# Patient Record
Sex: FEMALE | Race: BLACK OR AFRICAN AMERICAN | Employment: OTHER | URBAN - METROPOLITAN AREA
[De-identification: names, ages, dates, MRNs, and addresses within clinical notes are randomized per-mention and may not be internally consistent; named-entity substitution may affect disease eponyms.]

---

## 2018-07-12 ENCOUNTER — HOSPITAL ENCOUNTER (OUTPATIENT)
Dept: CARDIAC REHAB | Age: 63
Discharge: HOME | End: 2018-07-12
Payer: MEDICARE

## 2018-07-12 VITALS — HEIGHT: 66 IN | WEIGHT: 188 LBS | BODY MASS INDEX: 30.22 KG/M2

## 2018-07-12 PROCEDURE — 93798 PHYS/QHP OP CAR RHAB W/ECG: CPT

## 2018-07-12 RX ORDER — RANITIDINE 150 MG/1
300 CAPSULE ORAL AS NEEDED
COMMUNITY
End: 2018-08-20

## 2018-07-12 RX ORDER — CARVEDILOL 3.12 MG/1
TABLET ORAL 2 TIMES DAILY WITH MEALS
Status: ON HOLD | COMMUNITY
End: 2018-11-01 | Stop reason: SDUPTHER

## 2018-07-12 RX ORDER — DOXYCYCLINE 100 MG/1
10 CAPSULE ORAL DAILY
COMMUNITY
End: 2018-08-20

## 2018-07-12 RX ORDER — LOVASTATIN 20 MG/1
20 TABLET ORAL
COMMUNITY
End: 2018-08-20 | Stop reason: DRUGHIGH

## 2018-07-12 RX ORDER — GLIMEPIRIDE 4 MG/1
4 TABLET ORAL
COMMUNITY
End: 2019-04-17 | Stop reason: SDUPTHER

## 2018-07-12 RX ORDER — METAXALONE 800 MG/1
800 TABLET ORAL AS NEEDED
COMMUNITY
End: 2018-08-20

## 2018-07-12 RX ORDER — METFORMIN HYDROCHLORIDE 500 MG/1
500 TABLET, EXTENDED RELEASE ORAL 2 TIMES DAILY
COMMUNITY
End: 2018-08-20 | Stop reason: DRUGHIGH

## 2018-07-12 NOTE — CARDIO/PULMONARY
Cardiopulmonary Rehab Orientation: Met with  Derik Gaona for the initial session. Mrs Vanessa Culp is a 70year-old patient of Dr. Cholo Sanchez, who presents to rehab for cardiac conditioning and strengthening, S/P PCI/stenting  6 total on 3/23/2018; LVEF 49%. History also includes CAD,HTN,Diabetes,Hyperlipidemia,Obesity,Back lumbar bulging disc and needs surgery to have cheyenne placed ,thyroid disease,. Bilateral knee pain ; Allergic to PCN and sensitive to aspirin Immunization for influenza vaccine -pt does not receive flu vaccines-did get shingles vaccine. Pt is former smoker-quit 12/31/1999. Lungs were CTA; denied any cough. No LE edema was present. Exercise at home includes therabands a few times per week for 15 min. Limitations: Low back pain and . Patient was given an educational notebook and viewed the cardiac rehab DVD. Psychosocial: lives with -was only with high stress level today due to having to wait since May to get in program and unsure of what to expect. BMI 30.8 , based on height of 5'5.5\" and weight of 188lbs  . Pt was concerned regarding blood sugar being elevated -given list of fruits low and high in sugar for reference. Patient's identified goals are:   1. Wt loss-long term goal 38 pounds-short term 10 pounds  2. Decrease LDL  3. Home exercise program          Plan: Return for first exercise session on Tuesday July 17 at 0900.

## 2018-07-17 ENCOUNTER — HOSPITAL ENCOUNTER (OUTPATIENT)
Dept: CARDIAC REHAB | Age: 63
Discharge: HOME | End: 2018-07-17
Payer: MEDICARE

## 2018-07-17 VITALS — WEIGHT: 189 LBS | BODY MASS INDEX: 30.97 KG/M2

## 2018-07-17 PROCEDURE — 93798 PHYS/QHP OP CAR RHAB W/ECG: CPT

## 2018-07-19 ENCOUNTER — HOSPITAL ENCOUNTER (OUTPATIENT)
Dept: CARDIAC REHAB | Age: 63
Discharge: HOME | End: 2018-07-19
Payer: MEDICARE

## 2018-07-19 VITALS — WEIGHT: 190 LBS | BODY MASS INDEX: 31.14 KG/M2

## 2018-07-19 PROCEDURE — 93798 PHYS/QHP OP CAR RHAB W/ECG: CPT

## 2018-07-20 ENCOUNTER — HOSPITAL ENCOUNTER (OUTPATIENT)
Dept: CARDIAC REHAB | Age: 63
Discharge: HOME | End: 2018-07-20
Payer: MEDICARE

## 2018-07-20 VITALS — WEIGHT: 189 LBS | BODY MASS INDEX: 30.97 KG/M2

## 2018-07-20 PROCEDURE — 93798 PHYS/QHP OP CAR RHAB W/ECG: CPT

## 2018-07-24 ENCOUNTER — HOSPITAL ENCOUNTER (OUTPATIENT)
Dept: CARDIAC REHAB | Age: 63
Discharge: HOME | End: 2018-07-24
Payer: MEDICARE

## 2018-07-24 VITALS — BODY MASS INDEX: 30.97 KG/M2 | WEIGHT: 189 LBS

## 2018-07-24 PROCEDURE — 93798 PHYS/QHP OP CAR RHAB W/ECG: CPT

## 2018-07-26 ENCOUNTER — HOSPITAL ENCOUNTER (OUTPATIENT)
Dept: CARDIAC REHAB | Age: 63
Discharge: HOME | End: 2018-07-26
Payer: MEDICARE

## 2018-07-26 VITALS — BODY MASS INDEX: 30.97 KG/M2 | WEIGHT: 189 LBS

## 2018-07-26 PROCEDURE — 93798 PHYS/QHP OP CAR RHAB W/ECG: CPT

## 2018-07-27 ENCOUNTER — APPOINTMENT (OUTPATIENT)
Dept: CARDIAC REHAB | Age: 63
End: 2018-07-27
Payer: MEDICARE

## 2018-07-31 ENCOUNTER — APPOINTMENT (OUTPATIENT)
Dept: GENERAL RADIOLOGY | Age: 63
End: 2018-07-31
Attending: PHYSICIAN ASSISTANT
Payer: MEDICARE

## 2018-07-31 ENCOUNTER — HOSPITAL ENCOUNTER (OUTPATIENT)
Dept: CARDIAC REHAB | Age: 63
Discharge: HOME | End: 2018-07-31
Payer: MEDICARE

## 2018-07-31 ENCOUNTER — HOSPITAL ENCOUNTER (EMERGENCY)
Age: 63
Discharge: HOME | End: 2018-07-31
Attending: EMERGENCY MEDICINE
Payer: MEDICARE

## 2018-07-31 VITALS
HEIGHT: 65 IN | OXYGEN SATURATION: 100 % | HEART RATE: 94 BPM | BODY MASS INDEX: 31.49 KG/M2 | WEIGHT: 189 LBS | SYSTOLIC BLOOD PRESSURE: 170 MMHG | TEMPERATURE: 97.6 F | DIASTOLIC BLOOD PRESSURE: 82 MMHG | RESPIRATION RATE: 20 BRPM

## 2018-07-31 DIAGNOSIS — M54.2 NECK PAIN: ICD-10-CM

## 2018-07-31 DIAGNOSIS — M54.12 CERVICAL RADICULAR PAIN: Primary | ICD-10-CM

## 2018-07-31 DIAGNOSIS — M25.511 ACUTE PAIN OF RIGHT SHOULDER: ICD-10-CM

## 2018-07-31 PROCEDURE — 96372 THER/PROPH/DIAG INJ SC/IM: CPT

## 2018-07-31 PROCEDURE — 74011636637 HC RX REV CODE- 636/637: Performed by: PHYSICIAN ASSISTANT

## 2018-07-31 PROCEDURE — 74011250636 HC RX REV CODE- 250/636: Performed by: PHYSICIAN ASSISTANT

## 2018-07-31 PROCEDURE — A9270 NON-COVERED ITEM OR SERVICE: HCPCS | Performed by: PHYSICIAN ASSISTANT

## 2018-07-31 PROCEDURE — 73030 X-RAY EXAM OF SHOULDER: CPT

## 2018-07-31 PROCEDURE — 72052 X-RAY EXAM NECK SPINE 6/>VWS: CPT

## 2018-07-31 PROCEDURE — 99283 EMERGENCY DEPT VISIT LOW MDM: CPT

## 2018-07-31 RX ORDER — TRAMADOL HYDROCHLORIDE 50 MG/1
50 TABLET ORAL
Qty: 20 TAB | Refills: 0 | Status: SHIPPED | OUTPATIENT
Start: 2018-07-31 | End: 2018-08-10

## 2018-07-31 RX ORDER — PREDNISONE 10 MG/1
TABLET ORAL
Qty: 21 TAB | Refills: 0 | Status: SHIPPED | OUTPATIENT
Start: 2018-07-31 | End: 2018-08-06

## 2018-07-31 RX ORDER — PREDNISONE 20 MG/1
60 TABLET ORAL
Status: COMPLETED | OUTPATIENT
Start: 2018-07-31 | End: 2018-07-31

## 2018-07-31 RX ORDER — ASPIRIN 81 MG/1
81 TABLET ORAL DAILY
COMMUNITY

## 2018-07-31 RX ORDER — GABAPENTIN 100 MG/1
100 CAPSULE ORAL 3 TIMES DAILY
COMMUNITY
End: 2018-08-20

## 2018-07-31 RX ORDER — KETOROLAC TROMETHAMINE 30 MG/ML
30 INJECTION, SOLUTION INTRAMUSCULAR; INTRAVENOUS
Status: COMPLETED | OUTPATIENT
Start: 2018-07-31 | End: 2018-07-31

## 2018-07-31 RX ADMIN — PREDNISONE 60 MG: 20 TABLET ORAL at 09:09

## 2018-07-31 RX ADMIN — KETOROLAC TROMETHAMINE 30 MG: 30 INJECTION, SOLUTION INTRAMUSCULAR at 09:10

## 2018-07-31 NOTE — ED NOTES
PA has reviewed discharge instructions with the patient. The patient verbalized understanding. Patient discharged home via wheelchair with daughter.
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
## 2018-07-31 NOTE — DISCHARGE INSTRUCTIONS
Neck Pain: Care Instructions  Your Care Instructions    You can have neck pain anywhere from the bottom of your head to the top of your shoulders. It can spread to the upper back or arms. Injuries, painting a ceiling, sleeping with your neck twisted, staying in one position for too long, and many other activities can cause neck pain. Most neck pain gets better with home care. Your doctor may recommend medicine to relieve pain or relax your muscles. He or she may suggest exercise and physical therapy to increase flexibility and relieve stress. You may need to wear a special (cervical) collar to support your neck for a day or two. Follow-up care is a key part of your treatment and safety. Be sure to make and go to all appointments, and call your doctor if you are having problems. It's also a good idea to know your test results and keep a list of the medicines you take. How can you care for yourself at home? · Try using a heating pad on a low or medium setting for 15 to 20 minutes every 2 or 3 hours. Try a warm shower in place of one session with the heating pad. · You can also try an ice pack for 10 to 15 minutes every 2 to 3 hours. Put a thin cloth between the ice and your skin. · Take pain medicines exactly as directed. ¨ If the doctor gave you a prescription medicine for pain, take it as prescribed. ¨ If you are not taking a prescription pain medicine, ask your doctor if you can take an over-the-counter medicine. · If your doctor recommends a cervical collar, wear it exactly as directed. When should you call for help? Call your doctor now or seek immediate medical care if:    · You have new or worsening numbness in your arms, buttocks or legs.     · You have new or worsening weakness in your arms or legs.  (This could make it hard to stand up.)     · You lose control of your bladder or bowels.    Watch closely for changes in your health, and be sure to contact your doctor if:    · Your neck pain is getting worse.     · You are not getting better after 1 week.     · You do not get better as expected. Where can you learn more? Go to http://campbell-mer.info/. Enter [id number] in the search box to learn more about \"Neck Pain: Care Instructions. \"  Current as of: November 29, 2017  Content Version: 11.7  © 5336-0763 [employer]. Care instructions adapted under license by [employer] (which disclaims liability or warranty for this information). If you have questions about a medical condition or this instruction, always ask your healthcare professional. [place] [address] any warranty or liability for your use of this information. n     Pinched Nerve in the Neck: Care Instructions  Your Care Instructions  A pinched nerve in the neck happens when a vertebra or disc in the upper part of your spine is damaged. This damage can happen because of an injury. Or it can just happen with age. The changes caused by the damage may put pressure on a nearby nerve root, pinching it. This causes symptoms such as sharp pain in your neck, shoulder, arm, hand, or back. You may also have tingling or numbness. Sometimes it makes your arm weaker. The symptoms are usually worse when you turn your head or strain your neck. For many people, the symptoms get better over time and finally go away. Early treatment usually includes medicines for pain and swelling. Sometimes physical therapy and special exercises may help. Follow-up care is a key part of your treatment and safety. Be sure to make and go to all appointments, and call your doctor if you are having problems. It's also a good idea to know your test results and keep a list of the medicines you take. How can you care for yourself at home? · Be safe with medicines. Read and follow all instructions on the label. ¨ If the doctor gave you a prescription medicine for pain, take it as prescribed.   ¨ If you are not taking a prescription pain medicine, ask your doctor if you can take an over-the-counter medicine. · Try using a heating pad on a low or medium setting for 15 to 20 minutes every 2 or 3 hours. Try a warm shower in place of one session with the heating pad. You can also buy single-use heat wraps that last up to 8 hours. · You can also try an ice pack for 10 to 15 minutes every 2 to 3 hours. There isn't strong evidence that either heat or ice will help. But you can try them to see if they help you. · Don't spend too long in one position. Take short breaks to move around and change positions. · Wear a seat belt and shoulder harness when you are in a car. · Sleep with a pillow under your head and neck that keeps your neck straight. · If you were given a neck brace (cervical collar) to limit neck motion, wear it as instructed for as many days as your doctor tells you to. Do not wear it longer than you were told to. Wearing a brace for too long can lead to neck stiffness and can weaken the neck muscles. · Follow your doctor's instructions for gentle neck-stretching exercises. · Do not smoke. Smoking can slow healing of your discs. If you need help quitting, talk to your doctor about stop-smoking programs and medicines. These can increase your chances of quitting for good. · Avoid strenuous work or exercise until your doctor says it is okay. When should you call for help? Call 911 anytime you think you may need emergency care. For example, call if:    · You are unable to move an arm or a leg at all.   [facility] your doctor now or seek immediate medical care if:    · You have new or worse symptoms in your arms, legs, chest, belly, or buttocks. Symptoms may include:  ¨ Numbness or tingling. ¨ Weakness. ¨ Pain.     · You lose bladder or bowel control.    Watch closely for changes in your health, and be sure to contact your doctor if:    · You are not getting better as expected. Where can you learn more?   Go to http://campbell-mer.info/. Enter V460 in the search box to learn more about \"Pinched Nerve in the Neck: Care Instructions. \"  Current as of: November 29, 2017  Content Version: 11.7  © 5140-6728 [employer]. Care instructions adapted under license by [employer] (which disclaims liability or warranty for this information). If you have questions about a medical condition or this instruction, always ask your healthcare professional. [address] any warranty or liability for your use of this information.

## 2018-07-31 NOTE — ED NOTES
Pt ambulatory to ED with daughter. Pt c/o of right shoulder and neck pain that began in March. Pt stated the pain started after her stent placement but does not think it has to do with this procedure. Pt stated \"pain takes my breath away\". Pt denies falling or trauma symptoms.

## 2018-08-02 ENCOUNTER — APPOINTMENT (OUTPATIENT)
Dept: CARDIAC REHAB | Age: 63
End: 2018-08-02
Payer: MEDICARE

## 2018-08-03 ENCOUNTER — APPOINTMENT (OUTPATIENT)
Dept: CARDIAC REHAB | Age: 63
End: 2018-08-03
Payer: MEDICARE

## 2018-08-09 ENCOUNTER — HOSPITAL ENCOUNTER (OUTPATIENT)
Dept: CARDIAC REHAB | Age: 63
Discharge: HOME | End: 2018-08-09
Payer: MEDICARE

## 2018-08-09 VITALS — BODY MASS INDEX: 30.95 KG/M2 | WEIGHT: 186 LBS

## 2018-08-09 PROCEDURE — 93798 PHYS/QHP OP CAR RHAB W/ECG: CPT

## 2018-08-14 ENCOUNTER — HOSPITAL ENCOUNTER (OUTPATIENT)
Dept: CARDIAC REHAB | Age: 63
Discharge: HOME | End: 2018-08-14
Payer: MEDICARE

## 2018-08-14 VITALS — BODY MASS INDEX: 31.12 KG/M2 | WEIGHT: 187 LBS

## 2018-08-14 PROCEDURE — 93798 PHYS/QHP OP CAR RHAB W/ECG: CPT

## 2018-08-16 ENCOUNTER — HOSPITAL ENCOUNTER (OUTPATIENT)
Dept: MRI IMAGING | Age: 63
Discharge: HOME | End: 2018-08-16
Attending: ORTHOPAEDIC SURGERY
Payer: MEDICARE

## 2018-08-16 ENCOUNTER — HOSPITAL ENCOUNTER (OUTPATIENT)
Dept: CARDIAC REHAB | Age: 63
Discharge: HOME | End: 2018-08-16
Payer: MEDICARE

## 2018-08-16 VITALS — BODY MASS INDEX: 31.28 KG/M2 | WEIGHT: 188 LBS

## 2018-08-16 DIAGNOSIS — M54.2 NECK PAIN: ICD-10-CM

## 2018-08-16 PROCEDURE — 72141 MRI NECK SPINE W/O DYE: CPT

## 2018-08-16 PROCEDURE — 93798 PHYS/QHP OP CAR RHAB W/ECG: CPT

## 2018-08-17 ENCOUNTER — HOSPITAL ENCOUNTER (OUTPATIENT)
Dept: CARDIAC REHAB | Age: 63
Discharge: HOME | End: 2018-08-17
Payer: MEDICARE

## 2018-08-17 VITALS — WEIGHT: 187 LBS | BODY MASS INDEX: 31.12 KG/M2

## 2018-08-17 PROCEDURE — 93797 PHYS/QHP OP CAR RHAB WO ECG: CPT

## 2018-08-17 PROCEDURE — 93798 PHYS/QHP OP CAR RHAB W/ECG: CPT

## 2018-08-20 PROBLEM — E03.9 HYPOTHYROIDISM: Status: ACTIVE | Noted: 2018-08-20

## 2018-08-21 ENCOUNTER — APPOINTMENT (OUTPATIENT)
Dept: CARDIAC REHAB | Age: 63
End: 2018-08-21
Payer: MEDICARE

## 2018-08-23 ENCOUNTER — HOSPITAL ENCOUNTER (OUTPATIENT)
Dept: CARDIAC REHAB | Age: 63
Discharge: HOME | End: 2018-08-23
Payer: MEDICARE

## 2018-08-23 VITALS — WEIGHT: 186 LBS | BODY MASS INDEX: 30.95 KG/M2

## 2018-08-23 PROCEDURE — 93798 PHYS/QHP OP CAR RHAB W/ECG: CPT

## 2018-08-28 ENCOUNTER — HOSPITAL ENCOUNTER (OUTPATIENT)
Dept: CARDIAC REHAB | Age: 63
Discharge: HOME | End: 2018-08-28
Payer: MEDICARE

## 2018-08-28 VITALS — WEIGHT: 185 LBS | BODY MASS INDEX: 30.79 KG/M2

## 2018-08-28 PROCEDURE — 93798 PHYS/QHP OP CAR RHAB W/ECG: CPT

## 2018-09-04 ENCOUNTER — HOSPITAL ENCOUNTER (OUTPATIENT)
Dept: CARDIAC REHAB | Age: 63
Discharge: HOME | End: 2018-09-04
Payer: MEDICARE

## 2018-09-04 VITALS — WEIGHT: 183 LBS | BODY MASS INDEX: 30.45 KG/M2

## 2018-09-04 PROCEDURE — 93798 PHYS/QHP OP CAR RHAB W/ECG: CPT

## 2018-09-04 NOTE — CARDIO/PULMONARY
Pt arrived for cardiac rehab appt. She has been to orthopedist and been diagnosed with cervical spondylosis with myelopathy, spinal stenosis in cervical region, cervical radiculopathy, cervicalgia and displacement of cervical intervertebral.  She states her doctor wants her to have surgery but she is currently on Brilinta for stents. She was advised to use rollator instead of her cane. Pt was provided a rollator at cardiac rehab to use. She denied being told not to continue her participation in cardiac rehab.

## 2018-09-06 ENCOUNTER — HOSPITAL ENCOUNTER (OUTPATIENT)
Dept: CARDIAC REHAB | Age: 63
Discharge: HOME | End: 2018-09-06
Payer: MEDICARE

## 2018-09-06 VITALS — BODY MASS INDEX: 30.29 KG/M2 | WEIGHT: 182 LBS

## 2018-09-06 PROCEDURE — 93798 PHYS/QHP OP CAR RHAB W/ECG: CPT

## 2018-09-07 ENCOUNTER — HOSPITAL ENCOUNTER (OUTPATIENT)
Dept: CARDIAC REHAB | Age: 63
Discharge: HOME | End: 2018-09-07
Payer: MEDICARE

## 2018-09-07 VITALS — BODY MASS INDEX: 30.45 KG/M2 | WEIGHT: 183 LBS

## 2018-09-07 PROCEDURE — 93798 PHYS/QHP OP CAR RHAB W/ECG: CPT

## 2018-09-11 ENCOUNTER — HOSPITAL ENCOUNTER (OUTPATIENT)
Dept: CARDIAC REHAB | Age: 63
Discharge: HOME | End: 2018-09-11
Payer: MEDICARE

## 2018-09-11 VITALS — BODY MASS INDEX: 30.45 KG/M2 | WEIGHT: 183 LBS

## 2018-09-11 PROCEDURE — 93798 PHYS/QHP OP CAR RHAB W/ECG: CPT

## 2018-09-11 RX ORDER — DULOXETIN HYDROCHLORIDE 60 MG/1
60 CAPSULE, DELAYED RELEASE ORAL DAILY
COMMUNITY
End: 2020-05-28

## 2018-09-25 ENCOUNTER — HOSPITAL ENCOUNTER (OUTPATIENT)
Dept: CARDIAC REHAB | Age: 63
Discharge: HOME | End: 2018-09-25
Payer: MEDICARE

## 2018-09-25 NOTE — CARDIO/PULMONARY
States she is having surgery Oct 30 and will be out a few weeks but in the meantime would like to come to a few more sessions and will bring a note stating she can do so prior to surgery. Hopes to try to get in next week.

## 2018-10-16 ENCOUNTER — HOSPITAL ENCOUNTER (OUTPATIENT)
Dept: PREADMISSION TESTING | Age: 63
Discharge: HOME | End: 2018-10-16
Attending: ORTHOPAEDIC SURGERY
Payer: MEDICARE

## 2018-10-16 ENCOUNTER — HOSPITAL ENCOUNTER (OUTPATIENT)
Dept: GENERAL RADIOLOGY | Age: 63
Discharge: HOME | End: 2018-10-16
Attending: ORTHOPAEDIC SURGERY
Payer: MEDICARE

## 2018-10-16 VITALS
WEIGHT: 186.95 LBS | TEMPERATURE: 98.2 F | RESPIRATION RATE: 20 BRPM | SYSTOLIC BLOOD PRESSURE: 144 MMHG | HEART RATE: 103 BPM | HEIGHT: 66 IN | BODY MASS INDEX: 30.05 KG/M2 | DIASTOLIC BLOOD PRESSURE: 77 MMHG | OXYGEN SATURATION: 99 %

## 2018-10-16 LAB
25(OH)D3 SERPL-MCNC: 18 NG/ML (ref 30–100)
ABO + RH BLD: NORMAL
ALBUMIN SERPL-MCNC: 3.7 G/DL (ref 3.5–5)
ALBUMIN/GLOB SERPL: 1 {RATIO} (ref 1.1–2.2)
ALP SERPL-CCNC: 130 U/L (ref 45–117)
ALT SERPL-CCNC: 16 U/L (ref 12–78)
ANION GAP SERPL CALC-SCNC: 7 MMOL/L (ref 5–15)
APPEARANCE UR: CLEAR
AST SERPL-CCNC: 12 U/L (ref 15–37)
BACTERIA URNS QL MICRO: ABNORMAL /HPF
BILIRUB SERPL-MCNC: 0.3 MG/DL (ref 0.2–1)
BILIRUB UR QL: NEGATIVE
BLOOD GROUP ANTIBODIES SERPL: NORMAL
BUN SERPL-MCNC: 12 MG/DL (ref 6–20)
BUN/CREAT SERPL: 19 (ref 12–20)
CALCIUM SERPL-MCNC: 9 MG/DL (ref 8.5–10.1)
CHLORIDE SERPL-SCNC: 107 MMOL/L (ref 97–108)
CO2 SERPL-SCNC: 25 MMOL/L (ref 21–32)
COLOR UR: ABNORMAL
CREAT SERPL-MCNC: 0.63 MG/DL (ref 0.55–1.02)
EPITH CASTS URNS QL MICRO: ABNORMAL /LPF
ERYTHROCYTE [DISTWIDTH] IN BLOOD BY AUTOMATED COUNT: 14.3 % (ref 11.5–14.5)
EST. AVERAGE GLUCOSE BLD GHB EST-MCNC: 157 MG/DL
GLOBULIN SER CALC-MCNC: 3.8 G/DL (ref 2–4)
GLUCOSE SERPL-MCNC: 133 MG/DL (ref 65–100)
GLUCOSE UR STRIP.AUTO-MCNC: NEGATIVE MG/DL
HBA1C MFR BLD: 7.1 % (ref 4.2–6.3)
HCT VFR BLD AUTO: 33.2 % (ref 35–47)
HGB BLD-MCNC: 10.6 G/DL (ref 11.5–16)
HGB UR QL STRIP: NEGATIVE
INR PPP: 1 (ref 0.9–1.1)
KETONES UR QL STRIP.AUTO: NEGATIVE MG/DL
LEUKOCYTE ESTERASE UR QL STRIP.AUTO: NEGATIVE
MCH RBC QN AUTO: 28.4 PG (ref 26–34)
MCHC RBC AUTO-ENTMCNC: 31.9 G/DL (ref 30–36.5)
MCV RBC AUTO: 89 FL (ref 80–99)
NITRITE UR QL STRIP.AUTO: NEGATIVE
NRBC # BLD: 0 K/UL (ref 0–0.01)
NRBC BLD-RTO: 0 PER 100 WBC
PH UR STRIP: 5.5 [PH] (ref 5–8)
PLATELET # BLD AUTO: 492 K/UL (ref 150–400)
PMV BLD AUTO: 9.1 FL (ref 8.9–12.9)
POTASSIUM SERPL-SCNC: 4 MMOL/L (ref 3.5–5.1)
PREALB SERPL-MCNC: 26.6 MG/DL (ref 20–40)
PROT SERPL-MCNC: 7.5 G/DL (ref 6.4–8.2)
PROT UR STRIP-MCNC: NEGATIVE MG/DL
PROTHROMBIN TIME: 10.2 SEC (ref 9–11.1)
RBC # BLD AUTO: 3.73 M/UL (ref 3.8–5.2)
RBC #/AREA URNS HPF: ABNORMAL /HPF (ref 0–5)
SODIUM SERPL-SCNC: 139 MMOL/L (ref 136–145)
SP GR UR REFRACTOMETRY: 1.02 (ref 1–1.03)
SPECIMEN EXP DATE BLD: NORMAL
UA: UC IF INDICATED,UAUC: ABNORMAL
UROBILINOGEN UR QL STRIP.AUTO: 0.2 EU/DL (ref 0.2–1)
WBC # BLD AUTO: 10.9 K/UL (ref 3.6–11)
WBC URNS QL MICRO: ABNORMAL /HPF (ref 0–4)

## 2018-10-16 PROCEDURE — 80053 COMPREHEN METABOLIC PANEL: CPT | Performed by: ORTHOPAEDIC SURGERY

## 2018-10-16 PROCEDURE — 86900 BLOOD TYPING SEROLOGIC ABO: CPT | Performed by: ORTHOPAEDIC SURGERY

## 2018-10-16 PROCEDURE — 85027 COMPLETE CBC AUTOMATED: CPT | Performed by: ORTHOPAEDIC SURGERY

## 2018-10-16 PROCEDURE — 36415 COLL VENOUS BLD VENIPUNCTURE: CPT | Performed by: ORTHOPAEDIC SURGERY

## 2018-10-16 PROCEDURE — 87086 URINE CULTURE/COLONY COUNT: CPT | Performed by: ORTHOPAEDIC SURGERY

## 2018-10-16 PROCEDURE — 82306 VITAMIN D 25 HYDROXY: CPT | Performed by: ORTHOPAEDIC SURGERY

## 2018-10-16 PROCEDURE — 83036 HEMOGLOBIN GLYCOSYLATED A1C: CPT | Performed by: ORTHOPAEDIC SURGERY

## 2018-10-16 PROCEDURE — 84134 ASSAY OF PREALBUMIN: CPT | Performed by: ORTHOPAEDIC SURGERY

## 2018-10-16 PROCEDURE — 71046 X-RAY EXAM CHEST 2 VIEWS: CPT

## 2018-10-16 PROCEDURE — 85610 PROTHROMBIN TIME: CPT | Performed by: ORTHOPAEDIC SURGERY

## 2018-10-16 PROCEDURE — 81001 URINALYSIS AUTO W/SCOPE: CPT | Performed by: ORTHOPAEDIC SURGERY

## 2018-10-16 RX ORDER — LEVOFLOXACIN 5 MG/ML
500 INJECTION, SOLUTION INTRAVENOUS ONCE
Status: CANCELLED | OUTPATIENT
Start: 2018-10-30 | End: 2018-10-30

## 2018-10-16 RX ORDER — CLOPIDOGREL BISULFATE 75 MG/1
75 TABLET ORAL DAILY
COMMUNITY
End: 2019-12-13

## 2018-10-16 RX ORDER — ACETAMINOPHEN 10 MG/ML
1000 INJECTION, SOLUTION INTRAVENOUS ONCE
Status: CANCELLED | OUTPATIENT
Start: 2018-10-30 | End: 2018-10-30

## 2018-10-16 RX ORDER — PREGABALIN 150 MG/1
150 CAPSULE ORAL ONCE
Status: CANCELLED | OUTPATIENT
Start: 2018-10-30 | End: 2018-10-30

## 2018-10-16 RX ORDER — VANCOMYCIN/0.9 % SOD CHLORIDE 1.5G/250ML
1500 PLASTIC BAG, INJECTION (ML) INTRAVENOUS ONCE
Status: CANCELLED | OUTPATIENT
Start: 2018-10-30 | End: 2018-10-30

## 2018-10-16 RX ORDER — RANITIDINE 300 MG/1
300 TABLET ORAL DAILY
COMMUNITY
End: 2019-04-17 | Stop reason: SDUPTHER

## 2018-10-16 RX ORDER — SODIUM CHLORIDE, SODIUM LACTATE, POTASSIUM CHLORIDE, CALCIUM CHLORIDE 600; 310; 30; 20 MG/100ML; MG/100ML; MG/100ML; MG/100ML
25 INJECTION, SOLUTION INTRAVENOUS CONTINUOUS
Status: CANCELLED | OUTPATIENT
Start: 2018-10-30

## 2018-10-16 NOTE — PERIOP NOTES
Patient here for PAT appointment. Labs including T&S drawn and sent. EKG per cardiology, Dr. Tanner Huitron, 8/21/18. CXR completed today. Dante Lopes MD - PCP. Pt to schedule clearance appt with Dr. Tanner Huitron when she leaves here today. Pt w/ hx of 6 drug-eluting stents placed on 3/21/18 at [facility]. See cardiac note from 8/21. NP Jamie Pimentel in to see pt today. METS<4 r/t neck/back pain. ZOHAIB 2 for age, BP, no apnea. Preop orders in. NOTE: Drug interaction notification between tizanidine and Levaquin. RN called pharmacy at [phone] and per Reina Dubin, pharmacist, jon to order Levaquin. [email or web address] - Pt scheduled for cardiac clearance on Friday 10/19 @4213.

## 2018-10-16 NOTE — PERIOP NOTES
[facility] Preoperative Instructions Surgery Date: Tuesday 10/30/18          Time of Arrival: 8:30 a.m. 
 
1. On the day of your surgery, please report to the Surgical Services Registration Desk and sign in at your designated time. The Surgery Center is located to the right of the Emergency Room. 2. You must have someone with you to drive you home. You should not drive a car for 24 hours following surgery. Please make arrangements for a friend or family member to stay with you for the first 24 hours after your surgery. 3. Do not have anything to eat or drink (including water, gum, mints, coffee, juice) after midnight. ?This may not apply to medications prescribed by your physician. ?(Please note below the special instructions with medications to take the morning of your procedure.) 4. We recommend you do not drink any alcoholic beverages for 24 hours before and after your surgery. 5. Contact your surgeons office for instructions on the following medications: non-steroidal anti-inflammatory drugs (i.e. Advil, Aleve), vitamins, and supplements. (Some surgeons will want you to stop these medications prior to surgery and others may allow you to take them) **If you are currently taking Plavix, Coumadin, Aspirin and/or other blood-thinning agents, contact your surgeon for instructions. ** Your surgeon will partner with the physician prescribing these medications to determine if it is safe to stop or if you need to continue taking. Please do not stop taking these medications without instructions from your surgeon 6. Wear comfortable clothes. Wear glasses instead of contacts. Do not bring any money or jewelry. Please bring picture ID, insurance card, and any prearranged co-payment or hospital payment. Do not wear make-up, particularly mascara the morning of your surgery. Do not wear nail polish, particularly if you are having foot /hand surgery.   Wear your hair loose or down, no ponytails, buns, kathryn pins or clips. All body piercings must be removed. Please shower with antibacterial soap for three consecutive days before and on the morning of surgery, but do not apply any lotions, powders or deodorants after the shower on the day of surgery. Please use a fresh towels after each shower. Please sleep in clean clothes and change bed linens the night before surgery. Please do not shave for 48 hours prior to surgery. Shaving of the face is acceptable. 7. You should understand that if you do not follow these instructions your surgery may be cancelled. If your physical condition changes (I.e. fever, cold or flu) please contact your surgeon as soon as possible. 8. It is important that you be on time. If a situation occurs where you may be late, please call [phone] (OR Holding Area). 9. If you have any questions and or problems, please call [phone] (Pre-admission Testing). 10. Your surgery time may be subject to change. You will receive a phone call the evening prior if your time changes. 11.  If having outpatient surgery, you must have someone to drive you here, stay with you during the duration of your stay, and to drive you home at time of discharge. 12.   In an effort to improve the efficiency, privacy, and safety for all of our Pre-op patients visitors are not allowed in the Holding area. Once you arrive and are registered your family/visitors will be asked to remain in the waiting room. The Pre-op staff will get you from the Surgical Waiting Area and will explain to you and your family/visitors that the Pre-op phase is beginning. The staff will answer any questions and provide instructions for tracking of the patient, by use of the existing tracking number and color-coded status board in the waiting room.   At this time the staff will also ask for your designated spokesperson information in the event that the physician or staff need to provide an update or obtain any pertinent information. The designated spokesperson will be notified if the physician needs to speak to family during the pre-operative phase. If at any time your family/visitors has questions or concerns they may approach the volunteer desk in the waiting area for assistance. Special Instructions: 
1) Practice incentive spirometry and bring with you on the day of surgery. 2) Schedule cardiac clearance appointment with Dr. José Goodman ([phone]) and be sure to obtain preop instructions for PLAVIX. MEDICATIONS TO TAKE THE MORNING OF SURGERY WITH A SIP OF WATER: carvedilol, duloxetine, levothyroxine, tizanidine, ranitidine if needed and tramadol if needed. I understand a pre-operative phone call will be made to verify my surgery time. In the event that I am not available, I give permission for a message to be left on my answering service and/or with another person? Yes [phone] (daughter). ___________________      __________   _________ 
  (Signature of Patient)             (Witness)                (Date and Time)

## 2018-10-16 NOTE — PERIOP NOTES
Incentive Codey Barros Using the incentive spirometer helps expand the small air sacs of your lungs, helps you breathe deeply, and helps improve your lung function. Use your incentive spirometer twice a day (10 breaths each time) prior to surgery. How to Use Your Incentive Spirometer: 1. Hold the incentive spirometer in an upright position. 2. Breathe out as usual.  
3. Place the mouthpiece in your mouth and seal your lips tightly around it. 4. Take a deep breath. Breathe in slowly and as deeply as possible. Keep the blue flow rate guide between the arrows. 5. Hold your breath as long as possible. Then exhale slowly and allow the piston to fall to the bottom of the column. 6. Rest for a few seconds and repeat steps one through five at least 10 times. PAT Tidal Volume___1250______  x____3_______  Date______10/16/18_______ BRING THE INCENTIVE SPIROMETER WITH YOU TO THE HOSPITAL ON THE DAY OF YOUR SURGERY. Opportunity given to ask and answer questions as well as to observe return demonstration. Patient signature_____________________________    Witness____________________________

## 2018-10-17 LAB
BACTERIA SPEC CULT: NORMAL
BACTERIA SPEC CULT: NORMAL
SERVICE CMNT-IMP: NORMAL

## 2018-10-18 LAB
BACTERIA SPEC CULT: NORMAL
CC UR VC: NORMAL
SERVICE CMNT-IMP: NORMAL

## 2018-10-18 RX ORDER — CHOLECALCIFEROL (VITAMIN D3) 125 MCG
CAPSULE ORAL DAILY
COMMUNITY
End: 2019-05-01 | Stop reason: CLARIF

## 2018-10-18 NOTE — ADVANCED PRACTICE NURSE
PC to pt regarding low vitamin D level and recommendation by Dr. Madeline Tirado to take OTC vitamin D 2000 iu daily. Pt states she will start today as recommended. PTA medlist updated.

## 2018-10-23 NOTE — PERIOP NOTES
Cardiac clearance note of 10/19 cleared pt for surgery but no preop instructions for Plavix and aspirin. Note states this has already been addressed in a letter to Dr. Maxi Renteria.  for Michael Nguyen requesting/verifying preop instructions for pt. 
 
10/23 - Received faxed letter confirming preop instructions: PLAVIX 7 days; continue aspirin. 10/25 - PC to patient verifying d/c of PLAVIX; pt stated she d/c Plavix on Tuesday 10/23. Pt knows to continue aspirin per Dr. Ailin Harrington instructions.

## 2018-10-30 ENCOUNTER — HOSPITAL ENCOUNTER (INPATIENT)
Age: 63
LOS: 1 days | Discharge: HOME HEALTH | DRG: 472 | End: 2018-11-01
Attending: ORTHOPAEDIC SURGERY | Admitting: ORTHOPAEDIC SURGERY
Payer: MEDICARE

## 2018-10-30 ENCOUNTER — APPOINTMENT (OUTPATIENT)
Dept: GENERAL RADIOLOGY | Age: 63
DRG: 472 | End: 2018-10-30
Attending: ORTHOPAEDIC SURGERY
Payer: MEDICARE

## 2018-10-30 ENCOUNTER — ANESTHESIA (OUTPATIENT)
Dept: SURGERY | Age: 63
DRG: 472 | End: 2018-10-30
Payer: MEDICARE

## 2018-10-30 ENCOUNTER — ANESTHESIA EVENT (OUTPATIENT)
Dept: SURGERY | Age: 63
DRG: 472 | End: 2018-10-30
Payer: MEDICARE

## 2018-10-30 DIAGNOSIS — Z98.1 S/P CERVICAL SPINAL FUSION: Primary | ICD-10-CM

## 2018-10-30 PROBLEM — M48.02 CERVICAL STENOSIS OF SPINAL CANAL: Status: ACTIVE | Noted: 2018-10-30

## 2018-10-30 LAB
GLUCOSE BLD STRIP.AUTO-MCNC: 137 MG/DL (ref 65–100)
GLUCOSE BLD STRIP.AUTO-MCNC: 162 MG/DL (ref 65–100)
GLUCOSE BLD STRIP.AUTO-MCNC: 277 MG/DL (ref 65–100)
SERVICE CMNT-IMP: ABNORMAL

## 2018-10-30 PROCEDURE — 74011250637 HC RX REV CODE- 250/637

## 2018-10-30 PROCEDURE — 74011250636 HC RX REV CODE- 250/636: Performed by: NURSE PRACTITIONER

## 2018-10-30 PROCEDURE — 77030020782 HC GWN BAIR PAWS FLX 3M -B

## 2018-10-30 PROCEDURE — 77030012961 HC IRR KT CYSTO/TUR ICUM -A: Performed by: ORTHOPAEDIC SURGERY

## 2018-10-30 PROCEDURE — 77030020274 HC MISC IMPL ORTHOPEDIC: Performed by: ORTHOPAEDIC SURGERY

## 2018-10-30 PROCEDURE — 76010000171 HC OR TIME 2 TO 2.5 HR INTENSV-TIER 1: Performed by: ORTHOPAEDIC SURGERY

## 2018-10-30 PROCEDURE — 77030034849: Performed by: ORTHOPAEDIC SURGERY

## 2018-10-30 PROCEDURE — 74011250636 HC RX REV CODE- 250/636: Performed by: ANESTHESIOLOGY

## 2018-10-30 PROCEDURE — 99218 HC RM OBSERVATION: CPT

## 2018-10-30 PROCEDURE — C1713 ANCHOR/SCREW BN/BN,TIS/BN: HCPCS | Performed by: ORTHOPAEDIC SURGERY

## 2018-10-30 PROCEDURE — 77030004391 HC BUR FLUT MEDT -C: Performed by: ORTHOPAEDIC SURGERY

## 2018-10-30 PROCEDURE — 74011250637 HC RX REV CODE- 250/637: Performed by: NURSE PRACTITIONER

## 2018-10-30 PROCEDURE — 76210000016 HC OR PH I REC 1 TO 1.5 HR: Performed by: ORTHOPAEDIC SURGERY

## 2018-10-30 PROCEDURE — 77030018719 HC DRSG PTCH ANTIMIC J&J -A: Performed by: ORTHOPAEDIC SURGERY

## 2018-10-30 PROCEDURE — 74011250636 HC RX REV CODE- 250/636: Performed by: ORTHOPAEDIC SURGERY

## 2018-10-30 PROCEDURE — 74011000272 HC RX REV CODE- 272: Performed by: ORTHOPAEDIC SURGERY

## 2018-10-30 PROCEDURE — 82962 GLUCOSE BLOOD TEST: CPT

## 2018-10-30 PROCEDURE — 77010033678 HC OXYGEN DAILY

## 2018-10-30 PROCEDURE — 77030033138 HC SUT PGA STRATFX J&J -B: Performed by: ORTHOPAEDIC SURGERY

## 2018-10-30 PROCEDURE — 77030013567 HC DRN WND RESERV BARD -A: Performed by: ORTHOPAEDIC SURGERY

## 2018-10-30 PROCEDURE — 76060000035 HC ANESTHESIA 2 TO 2.5 HR: Performed by: ORTHOPAEDIC SURGERY

## 2018-10-30 PROCEDURE — 74011636637 HC RX REV CODE- 636/637: Performed by: ORTHOPAEDIC SURGERY

## 2018-10-30 PROCEDURE — 77030014650 HC SEAL MTRX FLOSEL BAXT -C: Performed by: ORTHOPAEDIC SURGERY

## 2018-10-30 PROCEDURE — 74011250637 HC RX REV CODE- 250/637: Performed by: ORTHOPAEDIC SURGERY

## 2018-10-30 PROCEDURE — 76001 XR FLUOROSCOPY OVER 60 MINUTES: CPT

## 2018-10-30 PROCEDURE — 77030002996 HC SUT SLK J&J -A: Performed by: ORTHOPAEDIC SURGERY

## 2018-10-30 PROCEDURE — 77030020268 HC MISC GENERAL SUPPLY: Performed by: ORTHOPAEDIC SURGERY

## 2018-10-30 PROCEDURE — 77030037694 HC ALLGRFT BN VIA FORM VIVX -G: Performed by: ORTHOPAEDIC SURGERY

## 2018-10-30 PROCEDURE — 77030038933 HC CGE SPN FORTCR NANV -G: Performed by: ORTHOPAEDIC SURGERY

## 2018-10-30 PROCEDURE — 77030029099 HC BN WAX SSPC -A: Performed by: ORTHOPAEDIC SURGERY

## 2018-10-30 PROCEDURE — 77030018836 HC SOL IRR NACL ICUM -A: Performed by: ORTHOPAEDIC SURGERY

## 2018-10-30 PROCEDURE — G8978 MOBILITY CURRENT STATUS: HCPCS

## 2018-10-30 PROCEDURE — 77030018846 HC SOL IRR STRL H20 ICUM -A: Performed by: ORTHOPAEDIC SURGERY

## 2018-10-30 PROCEDURE — 77030032490 HC SLV COMPR SCD KNE COVD -B: Performed by: ORTHOPAEDIC SURGERY

## 2018-10-30 PROCEDURE — 77030021678 HC GLIDESCP STAT DISP VERT -B: Performed by: NURSE ANESTHETIST, CERTIFIED REGISTERED

## 2018-10-30 PROCEDURE — 77030008467 HC STPLR SKN COVD -B: Performed by: ORTHOPAEDIC SURGERY

## 2018-10-30 PROCEDURE — 77030003028 HC SUT VCRL J&J -A: Performed by: ORTHOPAEDIC SURGERY

## 2018-10-30 PROCEDURE — 77030009868 HC PIN DISTR CASPR AESC -B: Performed by: ORTHOPAEDIC SURGERY

## 2018-10-30 PROCEDURE — 97116 GAIT TRAINING THERAPY: CPT

## 2018-10-30 PROCEDURE — 0RG20A0 FUSION OF 2 OR MORE CERVICAL VERTEBRAL JOINTS WITH INTERBODY FUSION DEVICE, ANTERIOR APPROACH, ANTERIOR COLUMN, OPEN APPROACH: ICD-10-PCS | Performed by: ORTHOPAEDIC SURGERY

## 2018-10-30 PROCEDURE — 77030013079 HC BLNKT BAIR HGGR 3M -A: Performed by: NURSE ANESTHETIST, CERTIFIED REGISTERED

## 2018-10-30 PROCEDURE — 74011000250 HC RX REV CODE- 250

## 2018-10-30 PROCEDURE — 77030003029 HC SUT VCRL J&J -B: Performed by: ORTHOPAEDIC SURGERY

## 2018-10-30 PROCEDURE — 77030019908 HC STETH ESOPH SIMS -A: Performed by: NURSE ANESTHETIST, CERTIFIED REGISTERED

## 2018-10-30 PROCEDURE — 77030011267 HC ELECTRD BLD COVD -A: Performed by: ORTHOPAEDIC SURGERY

## 2018-10-30 PROCEDURE — 77030008771 HC TU NG SALEM SUMP -A: Performed by: NURSE ANESTHETIST, CERTIFIED REGISTERED

## 2018-10-30 PROCEDURE — 97161 PT EVAL LOW COMPLEX 20 MIN: CPT

## 2018-10-30 PROCEDURE — G8979 MOBILITY GOAL STATUS: HCPCS

## 2018-10-30 PROCEDURE — 77030012407 HC DRN WND BARD -B: Performed by: ORTHOPAEDIC SURGERY

## 2018-10-30 PROCEDURE — 0RB30ZZ EXCISION OF CERVICAL VERTEBRAL DISC, OPEN APPROACH: ICD-10-PCS | Performed by: ORTHOPAEDIC SURGERY

## 2018-10-30 PROCEDURE — 74011000250 HC RX REV CODE- 250: Performed by: ORTHOPAEDIC SURGERY

## 2018-10-30 PROCEDURE — 74011250636 HC RX REV CODE- 250/636

## 2018-10-30 PROCEDURE — 77030039267 HC ADH SKN EXOFIN S2SG -B: Performed by: ORTHOPAEDIC SURGERY

## 2018-10-30 PROCEDURE — 94760 N-INVAS EAR/PLS OXIMETRY 1: CPT

## 2018-10-30 PROCEDURE — 72040 X-RAY EXAM NECK SPINE 2-3 VW: CPT

## 2018-10-30 DEVICE — FORTICORE® FLAT CERVICAL SPACER 7X16X14, (6°)
Type: IMPLANTABLE DEVICE | Site: SPINE CERVICAL | Status: FUNCTIONAL
Brand: FORTICORE®

## 2018-10-30 DEVICE — IMPLANTABLE DEVICE: Type: IMPLANTABLE DEVICE | Site: SPINE CERVICAL | Status: FUNCTIONAL

## 2018-10-30 DEVICE — FORTIBRIDGETM ANTERIOR CERVICAL PLATE 2 LEVEL 32MM
Type: IMPLANTABLE DEVICE | Site: SPINE CERVICAL | Status: FUNCTIONAL
Brand: FORTIBRIDGETM

## 2018-10-30 RX ORDER — ONDANSETRON 2 MG/ML
4 INJECTION INTRAMUSCULAR; INTRAVENOUS
Status: ACTIVE | OUTPATIENT
Start: 2018-10-30 | End: 2018-10-31

## 2018-10-30 RX ORDER — MIDAZOLAM HYDROCHLORIDE 1 MG/ML
0.5 INJECTION, SOLUTION INTRAMUSCULAR; INTRAVENOUS
Status: DISCONTINUED | OUTPATIENT
Start: 2018-10-30 | End: 2018-10-30 | Stop reason: HOSPADM

## 2018-10-30 RX ORDER — PREGABALIN 75 MG/1
150 CAPSULE ORAL ONCE
Status: COMPLETED | OUTPATIENT
Start: 2018-10-30 | End: 2018-10-30

## 2018-10-30 RX ORDER — HYDROMORPHONE HYDROCHLORIDE 1 MG/ML
0.5 INJECTION, SOLUTION INTRAMUSCULAR; INTRAVENOUS; SUBCUTANEOUS
Status: DISCONTINUED | OUTPATIENT
Start: 2018-10-30 | End: 2018-10-30 | Stop reason: HOSPADM

## 2018-10-30 RX ORDER — ROPIVACAINE HYDROCHLORIDE 5 MG/ML
30 INJECTION, SOLUTION EPIDURAL; INFILTRATION; PERINEURAL AS NEEDED
Status: DISCONTINUED | OUTPATIENT
Start: 2018-10-30 | End: 2018-10-30 | Stop reason: HOSPADM

## 2018-10-30 RX ORDER — PHENYLEPHRINE HCL IN 0.9% NACL 0.4MG/10ML
SYRINGE (ML) INTRAVENOUS AS NEEDED
Status: DISCONTINUED | OUTPATIENT
Start: 2018-10-30 | End: 2018-10-30 | Stop reason: HOSPADM

## 2018-10-30 RX ORDER — VANCOMYCIN/0.9 % SOD CHLORIDE 1.5G/250ML
1500 PLASTIC BAG, INJECTION (ML) INTRAVENOUS ONCE
Status: COMPLETED | OUTPATIENT
Start: 2018-10-30 | End: 2018-10-30

## 2018-10-30 RX ORDER — MIDAZOLAM HYDROCHLORIDE 1 MG/ML
1 INJECTION, SOLUTION INTRAMUSCULAR; INTRAVENOUS AS NEEDED
Status: DISCONTINUED | OUTPATIENT
Start: 2018-10-30 | End: 2018-10-30 | Stop reason: HOSPADM

## 2018-10-30 RX ORDER — GLYCOPYRROLATE 0.2 MG/ML
INJECTION INTRAMUSCULAR; INTRAVENOUS AS NEEDED
Status: DISCONTINUED | OUTPATIENT
Start: 2018-10-30 | End: 2018-10-30 | Stop reason: HOSPADM

## 2018-10-30 RX ORDER — HYDROCODONE BITARTRATE AND ACETAMINOPHEN 5; 325 MG/1; MG/1
1-2 TABLET ORAL
Status: DISCONTINUED | OUTPATIENT
Start: 2018-10-30 | End: 2018-11-01 | Stop reason: HOSPADM

## 2018-10-30 RX ORDER — GLIMEPIRIDE 1 MG/1
4 TABLET ORAL
Status: DISCONTINUED | OUTPATIENT
Start: 2018-10-30 | End: 2018-10-30

## 2018-10-30 RX ORDER — ROCURONIUM BROMIDE 10 MG/ML
INJECTION, SOLUTION INTRAVENOUS AS NEEDED
Status: DISCONTINUED | OUTPATIENT
Start: 2018-10-30 | End: 2018-10-30 | Stop reason: HOSPADM

## 2018-10-30 RX ORDER — SODIUM CHLORIDE 9 MG/ML
125 INJECTION, SOLUTION INTRAVENOUS CONTINUOUS
Status: DISPENSED | OUTPATIENT
Start: 2018-10-30 | End: 2018-10-31

## 2018-10-30 RX ORDER — GABAPENTIN 100 MG/1
100 CAPSULE ORAL 3 TIMES DAILY
Status: DISCONTINUED | OUTPATIENT
Start: 2018-10-30 | End: 2018-10-30

## 2018-10-30 RX ORDER — INSULIN GLARGINE 100 [IU]/ML
18 INJECTION, SOLUTION SUBCUTANEOUS EVERY EVENING
Status: DISCONTINUED | OUTPATIENT
Start: 2018-10-30 | End: 2018-11-01 | Stop reason: HOSPADM

## 2018-10-30 RX ORDER — GLIMEPIRIDE 1 MG/1
4 TABLET ORAL
Status: DISCONTINUED | OUTPATIENT
Start: 2018-10-31 | End: 2018-11-01 | Stop reason: HOSPADM

## 2018-10-30 RX ORDER — FENTANYL CITRATE 50 UG/ML
25 INJECTION, SOLUTION INTRAMUSCULAR; INTRAVENOUS
Status: DISCONTINUED | OUTPATIENT
Start: 2018-10-30 | End: 2018-10-30 | Stop reason: HOSPADM

## 2018-10-30 RX ORDER — CEFAZOLIN SODIUM/WATER 2 G/20 ML
2 SYRINGE (ML) INTRAVENOUS EVERY 8 HOURS
Status: CANCELLED | OUTPATIENT
Start: 2018-10-30 | End: 2018-10-31

## 2018-10-30 RX ORDER — ONDANSETRON 2 MG/ML
4 INJECTION INTRAMUSCULAR; INTRAVENOUS AS NEEDED
Status: DISCONTINUED | OUTPATIENT
Start: 2018-10-30 | End: 2018-10-30 | Stop reason: HOSPADM

## 2018-10-30 RX ORDER — FENTANYL CITRATE 50 UG/ML
INJECTION, SOLUTION INTRAMUSCULAR; INTRAVENOUS AS NEEDED
Status: DISCONTINUED | OUTPATIENT
Start: 2018-10-30 | End: 2018-10-30 | Stop reason: HOSPADM

## 2018-10-30 RX ORDER — AMOXICILLIN 250 MG
1 CAPSULE ORAL 2 TIMES DAILY
Status: DISCONTINUED | OUTPATIENT
Start: 2018-10-30 | End: 2018-11-01 | Stop reason: HOSPADM

## 2018-10-30 RX ORDER — SODIUM CHLORIDE 0.9 % (FLUSH) 0.9 %
5-10 SYRINGE (ML) INJECTION AS NEEDED
Status: DISCONTINUED | OUTPATIENT
Start: 2018-10-30 | End: 2018-10-30 | Stop reason: HOSPADM

## 2018-10-30 RX ORDER — DULOXETIN HYDROCHLORIDE 30 MG/1
60 CAPSULE, DELAYED RELEASE ORAL DAILY
Status: DISCONTINUED | OUTPATIENT
Start: 2018-10-30 | End: 2018-11-01 | Stop reason: HOSPADM

## 2018-10-30 RX ORDER — CARVEDILOL 3.12 MG/1
3.12 TABLET ORAL 2 TIMES DAILY WITH MEALS
Status: DISCONTINUED | OUTPATIENT
Start: 2018-10-30 | End: 2018-10-31

## 2018-10-30 RX ORDER — HYDROMORPHONE HYDROCHLORIDE 2 MG/ML
1 INJECTION, SOLUTION INTRAMUSCULAR; INTRAVENOUS; SUBCUTANEOUS
Status: ACTIVE | OUTPATIENT
Start: 2018-10-30 | End: 2018-10-31

## 2018-10-30 RX ORDER — PRAVASTATIN SODIUM 40 MG/1
40 TABLET ORAL
Status: DISCONTINUED | OUTPATIENT
Start: 2018-10-30 | End: 2018-11-01 | Stop reason: HOSPADM

## 2018-10-30 RX ORDER — DOXYCYCLINE 100 MG/1
100 CAPSULE ORAL DAILY
Status: DISCONTINUED | OUTPATIENT
Start: 2018-10-30 | End: 2018-10-30

## 2018-10-30 RX ORDER — FAMOTIDINE 20 MG/1
20 TABLET, FILM COATED ORAL 2 TIMES DAILY
Status: DISCONTINUED | OUTPATIENT
Start: 2018-10-30 | End: 2018-11-01 | Stop reason: HOSPADM

## 2018-10-30 RX ORDER — MORPHINE SULFATE 10 MG/ML
2 INJECTION, SOLUTION INTRAMUSCULAR; INTRAVENOUS
Status: DISCONTINUED | OUTPATIENT
Start: 2018-10-30 | End: 2018-10-30 | Stop reason: HOSPADM

## 2018-10-30 RX ORDER — SODIUM CHLORIDE, SODIUM LACTATE, POTASSIUM CHLORIDE, CALCIUM CHLORIDE 600; 310; 30; 20 MG/100ML; MG/100ML; MG/100ML; MG/100ML
100 INJECTION, SOLUTION INTRAVENOUS CONTINUOUS
Status: DISCONTINUED | OUTPATIENT
Start: 2018-10-30 | End: 2018-10-30 | Stop reason: HOSPADM

## 2018-10-30 RX ORDER — CYCLOBENZAPRINE HCL 10 MG
10 TABLET ORAL
Status: DISCONTINUED | OUTPATIENT
Start: 2018-10-30 | End: 2018-11-01 | Stop reason: HOSPADM

## 2018-10-30 RX ORDER — LEVOTHYROXINE SODIUM 112 UG/1
112 TABLET ORAL
Status: DISCONTINUED | OUTPATIENT
Start: 2018-10-31 | End: 2018-11-01 | Stop reason: HOSPADM

## 2018-10-30 RX ORDER — HYDRALAZINE HYDROCHLORIDE 20 MG/ML
10 INJECTION INTRAMUSCULAR; INTRAVENOUS
Status: DISCONTINUED | OUTPATIENT
Start: 2018-10-30 | End: 2018-11-01 | Stop reason: HOSPADM

## 2018-10-30 RX ORDER — ESMOLOL HYDROCHLORIDE 10 MG/ML
INJECTION INTRAVENOUS AS NEEDED
Status: DISCONTINUED | OUTPATIENT
Start: 2018-10-30 | End: 2018-10-30 | Stop reason: HOSPADM

## 2018-10-30 RX ORDER — LEVOFLOXACIN 5 MG/ML
500 INJECTION, SOLUTION INTRAVENOUS ONCE
Status: COMPLETED | OUTPATIENT
Start: 2018-10-30 | End: 2018-10-30

## 2018-10-30 RX ORDER — METFORMIN HYDROCHLORIDE 500 MG/1
500 TABLET ORAL 2 TIMES DAILY WITH MEALS
Status: DISCONTINUED | OUTPATIENT
Start: 2018-10-30 | End: 2018-11-01 | Stop reason: HOSPADM

## 2018-10-30 RX ORDER — SODIUM CHLORIDE 0.9 % (FLUSH) 0.9 %
5-10 SYRINGE (ML) INJECTION AS NEEDED
Status: DISCONTINUED | OUTPATIENT
Start: 2018-10-30 | End: 2018-11-01 | Stop reason: HOSPADM

## 2018-10-30 RX ORDER — CLOPIDOGREL BISULFATE 75 MG/1
75 TABLET ORAL DAILY
Status: DISCONTINUED | OUTPATIENT
Start: 2018-10-30 | End: 2018-11-01 | Stop reason: HOSPADM

## 2018-10-30 RX ORDER — NEOSTIGMINE METHYLSULFATE 1 MG/ML
INJECTION INTRAVENOUS AS NEEDED
Status: DISCONTINUED | OUTPATIENT
Start: 2018-10-30 | End: 2018-10-30 | Stop reason: HOSPADM

## 2018-10-30 RX ORDER — SODIUM CHLORIDE, SODIUM LACTATE, POTASSIUM CHLORIDE, CALCIUM CHLORIDE 600; 310; 30; 20 MG/100ML; MG/100ML; MG/100ML; MG/100ML
25 INJECTION, SOLUTION INTRAVENOUS CONTINUOUS
Status: DISCONTINUED | OUTPATIENT
Start: 2018-10-30 | End: 2018-10-30 | Stop reason: HOSPADM

## 2018-10-30 RX ORDER — DIAZEPAM 5 MG/1
5 TABLET ORAL
Status: DISCONTINUED | OUTPATIENT
Start: 2018-10-30 | End: 2018-11-01 | Stop reason: HOSPADM

## 2018-10-30 RX ORDER — DEXAMETHASONE SODIUM PHOSPHATE 4 MG/ML
INJECTION, SOLUTION INTRA-ARTICULAR; INTRALESIONAL; INTRAMUSCULAR; INTRAVENOUS; SOFT TISSUE AS NEEDED
Status: DISCONTINUED | OUTPATIENT
Start: 2018-10-30 | End: 2018-10-30 | Stop reason: HOSPADM

## 2018-10-30 RX ORDER — NALOXONE HYDROCHLORIDE 0.4 MG/ML
0.4 INJECTION, SOLUTION INTRAMUSCULAR; INTRAVENOUS; SUBCUTANEOUS AS NEEDED
Status: DISCONTINUED | OUTPATIENT
Start: 2018-10-30 | End: 2018-11-01 | Stop reason: HOSPADM

## 2018-10-30 RX ORDER — OXYCODONE HYDROCHLORIDE 5 MG/1
10 TABLET ORAL
Status: DISCONTINUED | OUTPATIENT
Start: 2018-10-30 | End: 2018-10-30 | Stop reason: SINTOL

## 2018-10-30 RX ORDER — ACETAMINOPHEN 10 MG/ML
1000 INJECTION, SOLUTION INTRAVENOUS ONCE
Status: COMPLETED | OUTPATIENT
Start: 2018-10-30 | End: 2018-10-30

## 2018-10-30 RX ORDER — OXYCODONE HYDROCHLORIDE 5 MG/1
5 TABLET ORAL
Status: DISCONTINUED | OUTPATIENT
Start: 2018-10-30 | End: 2018-10-30 | Stop reason: SINTOL

## 2018-10-30 RX ORDER — ACETAMINOPHEN 500 MG
1000 TABLET ORAL EVERY 6 HOURS
Status: DISCONTINUED | OUTPATIENT
Start: 2018-10-30 | End: 2018-10-30 | Stop reason: SINTOL

## 2018-10-30 RX ORDER — LIDOCAINE HYDROCHLORIDE 10 MG/ML
0.1 INJECTION, SOLUTION EPIDURAL; INFILTRATION; INTRACAUDAL; PERINEURAL AS NEEDED
Status: DISCONTINUED | OUTPATIENT
Start: 2018-10-30 | End: 2018-10-30 | Stop reason: HOSPADM

## 2018-10-30 RX ORDER — SODIUM CHLORIDE 0.9 % (FLUSH) 0.9 %
5-10 SYRINGE (ML) INJECTION EVERY 8 HOURS
Status: DISCONTINUED | OUTPATIENT
Start: 2018-10-30 | End: 2018-10-30 | Stop reason: HOSPADM

## 2018-10-30 RX ORDER — INSULIN LISPRO 100 [IU]/ML
2 INJECTION, SOLUTION INTRAVENOUS; SUBCUTANEOUS
Status: DISCONTINUED | OUTPATIENT
Start: 2018-10-30 | End: 2018-11-01 | Stop reason: HOSPADM

## 2018-10-30 RX ORDER — HYDROMORPHONE HYDROCHLORIDE 2 MG/ML
INJECTION, SOLUTION INTRAMUSCULAR; INTRAVENOUS; SUBCUTANEOUS AS NEEDED
Status: DISCONTINUED | OUTPATIENT
Start: 2018-10-30 | End: 2018-10-30 | Stop reason: HOSPADM

## 2018-10-30 RX ORDER — LIDOCAINE HYDROCHLORIDE 20 MG/ML
INJECTION, SOLUTION EPIDURAL; INFILTRATION; INTRACAUDAL; PERINEURAL AS NEEDED
Status: DISCONTINUED | OUTPATIENT
Start: 2018-10-30 | End: 2018-10-30 | Stop reason: HOSPADM

## 2018-10-30 RX ORDER — POLYETHYLENE GLYCOL 3350 17 G/17G
17 POWDER, FOR SOLUTION ORAL DAILY
Status: DISCONTINUED | OUTPATIENT
Start: 2018-10-30 | End: 2018-11-01 | Stop reason: HOSPADM

## 2018-10-30 RX ORDER — MELATONIN
2000 DAILY
Status: DISCONTINUED | OUTPATIENT
Start: 2018-10-30 | End: 2018-11-01 | Stop reason: HOSPADM

## 2018-10-30 RX ORDER — HYDROXYZINE HYDROCHLORIDE 10 MG/1
10 TABLET, FILM COATED ORAL
Status: DISCONTINUED | OUTPATIENT
Start: 2018-10-30 | End: 2018-11-01 | Stop reason: HOSPADM

## 2018-10-30 RX ORDER — SODIUM CHLORIDE 9 MG/ML
25 INJECTION, SOLUTION INTRAVENOUS CONTINUOUS
Status: DISCONTINUED | OUTPATIENT
Start: 2018-10-30 | End: 2018-10-30 | Stop reason: HOSPADM

## 2018-10-30 RX ORDER — LABETALOL HYDROCHLORIDE 5 MG/ML
INJECTION, SOLUTION INTRAVENOUS AS NEEDED
Status: DISCONTINUED | OUTPATIENT
Start: 2018-10-30 | End: 2018-10-30 | Stop reason: HOSPADM

## 2018-10-30 RX ORDER — FACIAL-BODY WIPES
10 EACH TOPICAL DAILY PRN
Status: DISCONTINUED | OUTPATIENT
Start: 2018-11-01 | End: 2018-11-01 | Stop reason: HOSPADM

## 2018-10-30 RX ORDER — ONDANSETRON 2 MG/ML
INJECTION INTRAMUSCULAR; INTRAVENOUS AS NEEDED
Status: DISCONTINUED | OUTPATIENT
Start: 2018-10-30 | End: 2018-10-30 | Stop reason: HOSPADM

## 2018-10-30 RX ORDER — MIDAZOLAM HYDROCHLORIDE 1 MG/ML
INJECTION, SOLUTION INTRAMUSCULAR; INTRAVENOUS AS NEEDED
Status: DISCONTINUED | OUTPATIENT
Start: 2018-10-30 | End: 2018-10-30 | Stop reason: HOSPADM

## 2018-10-30 RX ORDER — SODIUM CHLORIDE 0.9 % (FLUSH) 0.9 %
5-10 SYRINGE (ML) INJECTION EVERY 8 HOURS
Status: DISCONTINUED | OUTPATIENT
Start: 2018-10-31 | End: 2018-11-01 | Stop reason: HOSPADM

## 2018-10-30 RX ORDER — PROPOFOL 10 MG/ML
INJECTION, EMULSION INTRAVENOUS AS NEEDED
Status: DISCONTINUED | OUTPATIENT
Start: 2018-10-30 | End: 2018-10-30 | Stop reason: HOSPADM

## 2018-10-30 RX ORDER — SUCCINYLCHOLINE CHLORIDE 20 MG/ML
INJECTION INTRAMUSCULAR; INTRAVENOUS AS NEEDED
Status: DISCONTINUED | OUTPATIENT
Start: 2018-10-30 | End: 2018-10-30 | Stop reason: HOSPADM

## 2018-10-30 RX ORDER — ASPIRIN 81 MG/1
81 TABLET ORAL DAILY
Status: DISCONTINUED | OUTPATIENT
Start: 2018-10-30 | End: 2018-11-01 | Stop reason: HOSPADM

## 2018-10-30 RX ORDER — DEXAMETHASONE SODIUM PHOSPHATE 4 MG/ML
10 INJECTION, SOLUTION INTRA-ARTICULAR; INTRALESIONAL; INTRAMUSCULAR; INTRAVENOUS; SOFT TISSUE ONCE
Status: COMPLETED | OUTPATIENT
Start: 2018-10-31 | End: 2018-10-31

## 2018-10-30 RX ORDER — FENTANYL CITRATE 50 UG/ML
50 INJECTION, SOLUTION INTRAMUSCULAR; INTRAVENOUS AS NEEDED
Status: DISCONTINUED | OUTPATIENT
Start: 2018-10-30 | End: 2018-10-30 | Stop reason: HOSPADM

## 2018-10-30 RX ORDER — ALBUTEROL SULFATE 90 UG/1
AEROSOL, METERED RESPIRATORY (INHALATION) AS NEEDED
Status: DISCONTINUED | OUTPATIENT
Start: 2018-10-30 | End: 2018-10-30 | Stop reason: HOSPADM

## 2018-10-30 RX ADMIN — Medication 120 MCG: at 09:17

## 2018-10-30 RX ADMIN — FENTANYL CITRATE 100 MCG: 50 INJECTION, SOLUTION INTRAMUSCULAR; INTRAVENOUS at 07:53

## 2018-10-30 RX ADMIN — PROPOFOL 30 MG: 10 INJECTION, EMULSION INTRAVENOUS at 08:57

## 2018-10-30 RX ADMIN — METFORMIN HYDROCHLORIDE 500 MG: 500 TABLET, FILM COATED ORAL at 18:07

## 2018-10-30 RX ADMIN — HYDROMORPHONE HYDROCHLORIDE 0.4 MG: 2 INJECTION, SOLUTION INTRAMUSCULAR; INTRAVENOUS; SUBCUTANEOUS at 08:37

## 2018-10-30 RX ADMIN — SODIUM CHLORIDE, SODIUM LACTATE, POTASSIUM CHLORIDE, AND CALCIUM CHLORIDE 25 ML/HR: 600; 310; 30; 20 INJECTION, SOLUTION INTRAVENOUS at 07:10

## 2018-10-30 RX ADMIN — ACETAMINOPHEN 1000 MG: 500 TABLET ORAL at 13:00

## 2018-10-30 RX ADMIN — ESMOLOL HYDROCHLORIDE 30 MG: 10 INJECTION INTRAVENOUS at 07:53

## 2018-10-30 RX ADMIN — ACETAMINOPHEN 1000 MG: 10 INJECTION, SOLUTION INTRAVENOUS at 07:11

## 2018-10-30 RX ADMIN — Medication 120 MCG: at 08:19

## 2018-10-30 RX ADMIN — CLOPIDOGREL BISULFATE 75 MG: 75 TABLET ORAL at 14:13

## 2018-10-30 RX ADMIN — INSULIN LISPRO 2 UNITS: 100 INJECTION, SOLUTION INTRAVENOUS; SUBCUTANEOUS at 18:06

## 2018-10-30 RX ADMIN — ROCURONIUM BROMIDE 30 MG: 10 INJECTION, SOLUTION INTRAVENOUS at 07:58

## 2018-10-30 RX ADMIN — HYDROMORPHONE HYDROCHLORIDE 0.2 MG: 2 INJECTION, SOLUTION INTRAMUSCULAR; INTRAVENOUS; SUBCUTANEOUS at 09:39

## 2018-10-30 RX ADMIN — ROCURONIUM BROMIDE 5 MG: 10 INJECTION, SOLUTION INTRAVENOUS at 07:53

## 2018-10-30 RX ADMIN — GLYCOPYRROLATE 0.5 MG: 0.2 INJECTION INTRAMUSCULAR; INTRAVENOUS at 09:41

## 2018-10-30 RX ADMIN — NEOSTIGMINE METHYLSULFATE 3 MG: 1 INJECTION INTRAVENOUS at 09:41

## 2018-10-30 RX ADMIN — HYDRALAZINE HYDROCHLORIDE 10 MG: 20 INJECTION INTRAMUSCULAR; INTRAVENOUS at 15:22

## 2018-10-30 RX ADMIN — HYDROMORPHONE HYDROCHLORIDE 0.4 MG: 2 INJECTION, SOLUTION INTRAMUSCULAR; INTRAVENOUS; SUBCUTANEOUS at 08:39

## 2018-10-30 RX ADMIN — STANDARDIZED SENNA CONCENTRATE AND DOCUSATE SODIUM 1 TABLET: 8.6; 5 TABLET, FILM COATED ORAL at 18:07

## 2018-10-30 RX ADMIN — LEVOFLOXACIN 500 MG: 5 INJECTION, SOLUTION INTRAVENOUS at 08:04

## 2018-10-30 RX ADMIN — SODIUM CHLORIDE 125 ML/HR: 900 INJECTION, SOLUTION INTRAVENOUS at 10:33

## 2018-10-30 RX ADMIN — OXYCODONE HYDROCHLORIDE 5 MG: 5 TABLET ORAL at 19:08

## 2018-10-30 RX ADMIN — Medication 120 MCG: at 07:53

## 2018-10-30 RX ADMIN — VANCOMYCIN HYDROCHLORIDE 1500 MG: 10 INJECTION, POWDER, LYOPHILIZED, FOR SOLUTION INTRAVENOUS at 07:13

## 2018-10-30 RX ADMIN — Medication 120 MCG: at 08:50

## 2018-10-30 RX ADMIN — Medication 120 MCG: at 09:00

## 2018-10-30 RX ADMIN — HYDROMORPHONE HYDROCHLORIDE 0.2 MG: 2 INJECTION, SOLUTION INTRAMUSCULAR; INTRAVENOUS; SUBCUTANEOUS at 09:41

## 2018-10-30 RX ADMIN — HYDROCODONE BITARTRATE AND ACETAMINOPHEN 2 TABLET: 5; 325 TABLET ORAL at 22:06

## 2018-10-30 RX ADMIN — Medication 120 MCG: at 08:47

## 2018-10-30 RX ADMIN — LABETALOL HYDROCHLORIDE 5 MG: 5 INJECTION, SOLUTION INTRAVENOUS at 09:46

## 2018-10-30 RX ADMIN — PRAVASTATIN SODIUM 40 MG: 40 TABLET ORAL at 21:47

## 2018-10-30 RX ADMIN — MIDAZOLAM HYDROCHLORIDE 2 MG: 1 INJECTION, SOLUTION INTRAMUSCULAR; INTRAVENOUS at 07:42

## 2018-10-30 RX ADMIN — LIDOCAINE HYDROCHLORIDE 80 MG: 20 INJECTION, SOLUTION EPIDURAL; INFILTRATION; INTRACAUDAL; PERINEURAL at 07:53

## 2018-10-30 RX ADMIN — ONDANSETRON 4 MG: 2 INJECTION INTRAMUSCULAR; INTRAVENOUS at 09:25

## 2018-10-30 RX ADMIN — ALBUTEROL SULFATE 5 PUFF: 90 AEROSOL, METERED RESPIRATORY (INHALATION) at 09:49

## 2018-10-30 RX ADMIN — ALBUTEROL SULFATE 5 PUFF: 90 AEROSOL, METERED RESPIRATORY (INHALATION) at 09:58

## 2018-10-30 RX ADMIN — SUCCINYLCHOLINE CHLORIDE 140 MG: 20 INJECTION INTRAMUSCULAR; INTRAVENOUS at 07:53

## 2018-10-30 RX ADMIN — ALBUTEROL SULFATE 5 PUFF: 90 AEROSOL, METERED RESPIRATORY (INHALATION) at 09:50

## 2018-10-30 RX ADMIN — INSULIN GLARGINE 18 UNITS: 100 INJECTION, SOLUTION SUBCUTANEOUS at 18:04

## 2018-10-30 RX ADMIN — SODIUM CHLORIDE, POTASSIUM CHLORIDE, SODIUM LACTATE AND CALCIUM CHLORIDE: 600; 310; 30; 20 INJECTION, SOLUTION INTRAVENOUS at 07:56

## 2018-10-30 RX ADMIN — ASPIRIN 81 MG: 81 TABLET ORAL at 14:12

## 2018-10-30 RX ADMIN — PREGABALIN 150 MG: 75 CAPSULE ORAL at 06:48

## 2018-10-30 RX ADMIN — DEXAMETHASONE SODIUM PHOSPHATE 8 MG: 4 INJECTION, SOLUTION INTRA-ARTICULAR; INTRALESIONAL; INTRAMUSCULAR; INTRAVENOUS; SOFT TISSUE at 08:05

## 2018-10-30 RX ADMIN — HYDROMORPHONE HYDROCHLORIDE 0.2 MG: 2 INJECTION, SOLUTION INTRAMUSCULAR; INTRAVENOUS; SUBCUTANEOUS at 08:29

## 2018-10-30 RX ADMIN — VANCOMYCIN HYDROCHLORIDE 1500 MG: 10 INJECTION, POWDER, LYOPHILIZED, FOR SOLUTION INTRAVENOUS at 21:47

## 2018-10-30 RX ADMIN — LABETALOL HYDROCHLORIDE 5 MG: 5 INJECTION, SOLUTION INTRAVENOUS at 08:41

## 2018-10-30 RX ADMIN — Medication 120 MCG: at 08:04

## 2018-10-30 RX ADMIN — CARVEDILOL 3.12 MG: 3.12 TABLET, FILM COATED ORAL at 18:07

## 2018-10-30 RX ADMIN — FAMOTIDINE 20 MG: 20 TABLET ORAL at 18:07

## 2018-10-30 RX ADMIN — Medication 120 MCG: at 08:03

## 2018-10-30 RX ADMIN — PROPOFOL 200 MG: 10 INJECTION, EMULSION INTRAVENOUS at 07:53

## 2018-10-30 RX ADMIN — ACETAMINOPHEN 1000 MG: 500 TABLET ORAL at 18:07

## 2018-10-30 NOTE — PERIOP NOTES
Handoff Report from Operating Room to PACU Report received from Daisy Toussaint RN and Jazmine Beard CRNA regarding Jann Castillo. Surgeon(s): 
Jamil Marks MD  And Procedure(s) (LRB): 
C3-C5 ANTERIOR CERVICAL DISCECTOMY WITH FUSION (N/A)  confirmed  
with drains and dressings discussed. Anesthesia type, drugs, patient history, complications, estimated blood loss, vital signs, intake and output, and last pain medication, lines, reversal medications and temperature were reviewed.

## 2018-10-30 NOTE — BRIEF OP NOTE
BRIEF OPERATIVE NOTE Date of Procedure: 10/30/2018 Preoperative Diagnosis: CERVICAL RADICULOPATHY, SPONDYLOTHESIS WITH MYELOPATHY, CERVICAL STENOSIS Postoperative Diagnosis: CDRVICAL RADICULOPATHY, SPONDYLOLITHESISI Procedure(s): 
C3-C5 ANTERIOR CERVICAL DISCECTOMY WITH FUSION Surgeon(s) and Role: Janet Ibarra MD - Primary Surgical Assistant: Johan Schumacher Surgical Staff: 
Circ-1: Venessa Monroy RN Physician Assistant: YADI Andersen Radiology Technician: Juan Elizondoing Scrub Tech-1: Yamilka Langford Event Time In Time Out Incision Start 1145 Incision Close Anesthesia: General  
Estimated Blood Loss: 75cc Specimens: * No specimens in log * Findings: stenosis Complications: none Implants:  
Implant Name Type Inv. Item Serial No.  Lot No. LRB No. Used Action 16 MM FIXED SCREW X6   NA SYNTHES SPINAL NA N/A 1 Implanted SPACER CERV FLAT 6D [id number] -- FORTICORE - SNA  SPACER CERV FLAT 6D [id number] -- FORTICORE NA [facility] [id number] N/A 1 Implanted SPACER CERV FLAT 6D [id number] -- FORTICORE - SNA  SPACER CERV FLAT 6D [id number] -- FORTICORE NA [facility] [id number] N/A 1 Implanted PLATE SPNE CERV ANTR 2L 32MM -- FORTIBRIDGE - SNA  PLATE SPNE CERV ANTR 2L 32MM -- FORTIBRIDGE NA [employer] [facility] NA N/A 1 Implanted

## 2018-10-30 NOTE — PROGRESS NOTES
1202 Patient bit her tongue, does not remember doing so. Family at bedside. 1440 Ordered food for the patient. 1500 Patient had elevated bp x2, states she took her antihypertensive this am prior to surgery. Patient also received IV labetalol in the PACU or OR. Informed Earnstine Pals NP.  
 
5959 Patient states she does not tolerate oxycodone well due to stomach issues and prefers to take hydrocodone. Received an order for hydrocodone-acetaminophen 5-325 mg 1-2 tablets every 4 hours as needed for pain and to discontinue the current 1,000 mg and oxycodone 5-10 mg orders.

## 2018-10-30 NOTE — ROUTINE PROCESS
Patient: Tor Sunshine MRN: [id number]  SSN: [id number] YOB: 1955  Age: 70 y.o. Sex: female Patient is status post Procedure(s): 
C3-C5 ANTERIOR CERVICAL DISCECTOMY WITH FUSION. Surgeon(s) and Role: Jose Gutierrez MD - Primary Local/Dose/Irrigation:  NONE Peripheral IV 10/30/18 Left; Outer Arm (Active) Site Assessment Clean, dry, & intact 10/30/2018  7:09 AM  
Phlebitis Assessment 0 10/30/2018  7:09 AM  
Infiltration Assessment 0 10/30/2018  7:09 AM  
Dressing Status Clean, dry, & intact 10/30/2018  7:09 AM  
Dressing Type Transparent;Tape 10/30/2018  7:09 AM  
Hub Color/Line Status Pink; Infusing 10/30/2018  7:09 AM  
   
Peripheral IV 10/30/18 Right Hand (Active) Airway - Endotracheal Tube 10/30/18 Oral (Active) Line Bennett Lips 10/30/2018 12:00 AM  
         
 
 
 
Dressing/Packing:  Wound Neck Anterior-DRESSING TYPE: Sutures; Topical skin adhesive/glue (10/30/18 0902) Splint/Cast: Wound Neck Anterior-SPLINT TYPE/MATERIAL: Cervical Collar] Other:  BALKE DRAIN WITH BIOPATCH AND TEGADERM DRESSING

## 2018-10-30 NOTE — ANESTHESIA POSTPROCEDURE EVALUATION
Procedure(s): 
C3-C5 ANTERIOR CERVICAL DISCECTOMY WITH FUSION. Anesthesia Post Evaluation Patient location during evaluation: PACU Note status: Adequate. Level of consciousness: responsive to verbal stimuli and sleepy but conscious Pain management: satisfactory to patient Airway patency: patent Anesthetic complications: no 
Cardiovascular status: acceptable Respiratory status: acceptable Hydration status: acceptable Comments: +Post-Anesthesia Evaluation and Assessment Patient: Brandyn Catherine MRN: [id number]  SSN: [id number] YOB: 1955  Age: 70 y.o. Sex: female Cardiovascular Function/Vital Signs /68   Pulse 86   Temp 36.4 °C (97.5 °F)   Resp 16   Ht 5' 5.5\" (1.664 m)   Wt 83.9 kg (184 lb 15.5 oz)   SpO2 95%   BMI 30.31 kg/m² Patient is status post Procedure(s): 
C3-C5 ANTERIOR CERVICAL DISCECTOMY WITH FUSION. Nausea/Vomiting: Controlled. Postoperative hydration reviewed and adequate. Pain: 
Pain Scale 1: Numeric (0 - 10) (10/30/18 1100) Pain Intensity 1: 0 (10/30/18 1100) Managed. Neurological Status:  
Neuro (WDL): Exceptions to WDL (10/30/18 1006) At baseline. Mental Status and Level of Consciousness: Arousable. Pulmonary Status:  
O2 Device: Nasal cannula (10/30/18 1010) Adequate oxygenation and airway patent. Complications related to anesthesia: None Post-anesthesia assessment completed. No concerns. Signed By: Tj Morales MD  
 10/30/2018 Visit Vitals /68 Pulse 86 Temp 36.4 °C (97.5 °F) Resp 16 Ht 5' 5.5\" (1.664 m) Wt 83.9 kg (184 lb 15.5 oz) SpO2 95% BMI 30.31 kg/m²

## 2018-10-30 NOTE — PROGRESS NOTES
Problem: Mobility Impaired (Adult and Pediatric) Goal: *Acute Goals and Plan of Care (Insert Text) Physical Therapy Goals Initiated 10/30/2018 1. Patient will move from supine to sit and sit to supine , scoot up and down and roll side to side in bed with modified independence within 4 days. 2. Patient will perform sit to stand with modified independence within 4 days. 3. Patient will ambulate with modified independence for 250 feet with the least restrictive device within 4 days. 4. Patient will ascend/descend 5 stairs with 1 handrail(s) with modified independence within 4 days. 5. Patient will verbalize and demonstrate understanding of spinal precautions (No bending, lifting greater than 5 lbs, or twisting; log-roll technique; frequent repositioning as instructed) within 4 days. physical Therapy EVALUATION Patient: Aliyah Torres (82 y.o. female) Date: 10/30/2018 Primary Diagnosis: CERVICAL RADICULOPATHY, SPONDYLOTHESIS WITH MYELOPATHY, CERVICAL STENOSIS Cervical stenosis of spinal canal 
Procedure(s) (LRB): 
C3-C5 ANTERIOR CERVICAL DISCECTOMY WITH FUSION (N/A) Day of Surgery Precautions:   Spinal 
 
ASSESSMENT : 
Based on the objective data described below, the patient presents with post-op drowsiness, generalized weakness, decreased activity tolerance, and overall impaired functional mobility on POD 0 following C3-5 ACDF. Pt received supine in bed, agreeable to participation with therapy. Educated pt regarding cervical spine precautions as well as wearing schedule of brace with pt voicing understanding. Pt performed log roll technique with CGAx1 in order to assume seated position EOB. Remaining functional mobility occurred with CGAx1 including sit<>stand transfer and ambulation trial of 120ft w/ RW support. Gait speed decreased however steady overall with RW support. Pt reported significantly improved pain levels in comparison to pre-operatively.  Pt hypertensive with HR elevated to 119 BPM however vitals stable. Anticipate that pt will continue to progress well with therapy and be appropriate to return home w/ [facility]  [facility] at [facility]. Pt owns rollator walker but not rolling walker therefore equipment needs TBD. Patient will benefit from skilled intervention to address the above impairments. Patients rehabilitation potential is considered to be Good Factors which may influence rehabilitation potential include:  
[]         None noted 
[]         Mental ability/status []         Medical condition 
[]         Home/family situation and support systems 
[]         Safety awareness 
[]         Pain tolerance/management 
[]         Other: PLAN : 
Recommendations and Planned Interventions: 
[x]           Bed Mobility Training             []    Neuromuscular Re-Education 
[x]           Transfer Training                   []    Orthotic/Prosthetic Training 
[x]           Gait Training                         []    Modalities [x]           Therapeutic Exercises           []    Edema Management/Control 
[x]           Therapeutic Activities            [x]    Patient and Family Training/Education 
[x]           Other (comment): stair climbing Frequency/Duration: Patient will be followed by physical therapy  twice daily to address goals. Discharge Recommendations: Home Health Further Equipment Recommendations for Discharge: TBD, may need rolling walker SUBJECTIVE:  
Patient stated Oh my gosh, I haven't walked without pain in YEARS.  OBJECTIVE DATA SUMMARY:  
HISTORY:   
Past Medical History:  
Diagnosis Date  Arthritis  CAD (coronary artery disease) 6 stents (CARMITA) 3/21/18 at [facility]  Diabetes ([address]  Dizziness  Dyspepsia and other specified disorders of function of stomach  GERD (gastroesophageal reflux disease)  Headache(784.0)  Hx of cardiac cath 08/2018  
 after abnormal stress test 3/13/18  Hypertension  Loss of appetite  Musculoskeletal disorder   
 back pain  Other ill-defined conditions(799.89)   
 shingles  Thyroid disease   
 hypothyroidism Past Surgical History:  
Procedure Laterality Date [address]  
 hysterectomy & 2 c-sections  HX HEENT  1963  
 tonsils  HX ORTHOPAEDIC Bilateral   
 carpal tunnel release  HX ORTHOPAEDIC    
 lower back surgery  HX OTHER SURGICAL  1976  
 sweat glands removed @ [facility]  HX OTHER SURGICAL  2015  
 abscess removed from right back  OK DRAIN SKIN ABSCESS SIMPLE  1/9/12 Prior Level of Function/Home Situation: Mod I with use of either SPC or RW during ambulation, stating she uses rolling walker if pain is worse. History of 2 falls over previous year. Independent w/ ADLs. Lives at home w/ , daughter, and granddaughter who all will be assisting at discharge. Personal factors and/or comorbidities impacting plan of care:  
 
Home Situation Home Environment: Private residence # Steps to Enter: 5 Rails to Enter: Yes Hand Rails : Bilateral 
One/Two Story Residence: One story Living Alone: No 
Support Systems: Spouse/Significant Other/Partner, Child(delroy) Patient Expects to be Discharged to[de-identified] Private residence Current DME Used/Available at Home: Cane, straight, Walker, rollator, Raised toilet seat, Grab bars Tub or Shower Type: Shower EXAMINATION/PRESENTATION/DECISION MAKING: Critical Behavior: 
Neurologic State: Drowsy, Eyes open to voice Orientation Level: Oriented to person, Oriented to place, Oriented to situation Cognition: Follows commands Hearing: 
 Skin:  intact Edema: none noted Range Of Motion: 
AROM: Generally decreased, functional 
  
  
  
  
  
  
  
Strength:   
Strength: Generally decreased, functional 
  
  
  
  
  
  
Tone & Sensation:  
Tone: Normal 
  
  
  
  
Sensation: Intact Coordination: 
Coordination: Within functional limits Vision:  
  
Functional Mobility: 
Bed Mobility: Rolling: Contact guard assistance(log roll) Supine to Sit: Contact guard assistance Sit to Supine: Contact guard assistance Scooting: Contact guard assistance Transfers: 
Sit to Stand: Contact guard assistance Stand to Sit: Contact guard assistance Balance:  
Sitting: Intact Standing: Intact; With supportAmbulation/Gait Training:Distance (ft): 120 Feet (ft) Assistive Device: Walker, rolling;Gait belt Ambulation - Level of Assistance: Contact guard assistance Gait Abnormalities: Decreased step clearance Base of Support: Widened Speed/Jessica: Pace decreased (<100 feet/min) Step Length: Left shortened;Right shortened Functional Measure: 
Barthel Index: 
 
Bathin Bladder: 10 Bowels: 10 
Groomin Dressin Feeding: 10 Mobility: 10 Stairs: 0 Toilet Use: 5 Transfer (Bed to Chair and Back): 10 Total: 65 Barthel and G-code impairment scale: 
Percentage of impairment CH 
0% CI 
1-19% CJ 
20-39% CK 
40-59% CL 
60-79% CM 
80-99% CN 
100% Barthel Score 0-100 100 99-80 79-60 59-40 20-39 1-19 
 0 Barthel Score 0-20 20 17-19 13-16 9-12 5-8 1-4 0 The Barthel ADL Index: Guidelines 1. The index should be used as a record of what a patient does, not as a record of what a patient could do. 2. The main aim is to establish degree of independence from any help, physical or verbal, however minor and for whatever reason. 3. The need for supervision renders the patient not independent. 4. A patient's performance should be established using the best available evidence. Asking the patient, friends/relatives and nurses are the usual sources, but direct observation and common sense are also important. However direct testing is not needed. 5. Usually the patient's performance over the preceding 24-48 hours is important, but occasionally longer periods will be relevant.  
6. Middle categories imply that the patient supplies over 50 per cent of the effort. 7. Use of aids to be independent is allowed. Omar Hi., Barthel, D.W. (0398). Functional evaluation: the Barthel Index. [address] [facility] (14)2. MONE Crum, Susana Vila., Spencer Rahman., [place], [address] (1999). Measuring the change indisability after inpatient rehabilitation; comparison of the responsiveness of the Barthel Index and Functional [place] Measure. Journal of Neurology, Neurosurgery, and Psychiatry, 66(4), 123-780. LAN Salvador, YOSSI Merritt, & Nick Can MELIZABETH. (2004.) Assessment of post-stroke quality of life in cost-effectiveness studies: The usefulness of the Barthel Index and the EuroQoL-5D. [facility], 13, 433-18 G codes: In compliance with CMSs Claims Based Outcome Reporting, the following G-code set was chosen for this patient based on their primary functional limitation being treated: The outcome measure chosen to determine the severity of the functional limitation was the Barthel Index with a score of 65/100 which was correlated with the impairment scale. ? Mobility - Walking and Moving Around:  
  - CURRENT STATUS: CJ - 20%-39% impaired, limited or restricted  - GOAL STATUS: CI - 1%-19% impaired, limited or restricted  - D/C STATUS:  ---------------To be determined--------------- Physical Therapy Evaluation Charge Determination History Examination Presentation Decision-Making MEDIUM  Complexity : 1-2 comorbidities / personal factors will impact the outcome/ POC  MEDIUM Complexity : 3 Standardized tests and measures addressing body structure, function, activity limitation and / or participation in recreation  LOW Complexity : Stable, uncomplicated  MEDIUM Complexity : FOTO score of 26-74 Based on the above components, the patient evaluation is determined to be of the following complexity level: MEDIUM Pain: 
Pain Scale 1: Numeric (0 - 10) Pain Intensity 1: 10 
 Pain Location 1: Back Activity Tolerance: Hypertensive with HR elevated to 119 BPM with activity Please refer to the flowsheet for vital signs taken during this treatment. After treatment:  
[]         Patient left in no apparent distress sitting up in chair 
[x]         Patient left in no apparent distress in bed 
[x]         Call bell left within reach [x]         Nursing notified 
[x]         Caregiver present 
[]         Bed alarm activated COMMUNICATION/EDUCATION:  
The patients plan of care was discussed with: Registered Nurse. [x]         Fall prevention education was provided and the patient/caregiver indicated understanding. [x]         Patient/family have participated as able in goal setting and plan of care. [x]         Patient/family agree to work toward stated goals and plan of care. []         Patient understands intent and goals of therapy, but is neutral about his/her participation. []         Patient is unable to participate in goal setting and plan of care. Thank you for this referral. 
Joselin Arshad, PT, DPT Time Calculation: 20 mins

## 2018-10-30 NOTE — H&P
Progress notes Expand AllCollapse All Subjective:  
  
Patient ID: Sam Hawthorne is a 70 y.o. female. 
  
Chief Complaint: Pain of the Neck 
  
  
HPI:  Sam Hawthorne is a 70 y.o. female with complaints of neck pain radiating into her right shoulder, trapezius, and down the RUE. She has severe compression from C3-C7 and I would like to schedule her for a C3-C5 ACDF with a shoreline device for a potential C5-C7 ACDF at a future point. It is rated 9 out of 10 on the VAS. She ambulates with a walker. 
  
    
Patient Active Problem List  
  Diagnosis Date Noted  Lumbar disc herniation 09/27/2017  Spinal stenosis, lumbar region, with neurogenic claudication 09/27/2017  Low back pain 08/16/2017  Lumbosacral spondylosis without myelopathy 08/16/2017  Acquired spondylolisthesis 08/16/2017  Sciatica of left side 08/16/2017  
  
  
  
Current Outpatient Prescriptions:  
  traMADol (ULTRAM) 50 MG tablet, TAKE 1 TABLET BY MOUTH EVERY 8 HOURS AS NEEDED FOR PAIN. MAX DAILY AMOUNT: 150 MG (3 TABLETS), Disp: , Rfl:  
  carvedilol (COREG) 3.125 MG tablet, Take 3.125 mg by mouth 2 (two) times a day with meals. , Disp: , Rfl:  
  dexamethasone (DECADRON) 4 MG tablet, Take 1 tablet (4 mg total) by mouth daily with breakfast for 7 days, Disp: 7 tablet, Rfl: 0 
  Diclofenac Sodium (VOLTAREN) 1 % gel, Place 4 g on the skin every 6 (six) hours as needed (pain). , Disp: 3 Tube, Rfl: 11 
  DULoxetine (CYMBALTA) 30 MG capsule, Take 1 capsule (30 mg total) by mouth daily, Disp: 7 capsule, Rfl: 0 
  DULoxetine (CYMBALTA) 60 MG capsule, Take 1 capsule (60 mg total) by mouth daily, Disp: 30 capsule, Rfl: 11 
  insulin NPH (HumuLIN,NovoLIN) 100 UNIT/ML injection, Inject under the skin 2 (two) times a day before meals. , Disp: , Rfl:  
  Insulin NPH Isophane & Regular (HUMULIN 70/30 KWIKPEN) (70-30) 100 UNIT/ML suspension pen-injector, 50 unit 2 times a day before breakfast and Dinner, Disp: , Rfl:  
   levothyroxine (SYNTHROID, LEVOTHROID) 125 MCG tablet, Take 125 mcg by mouth daily. , Disp: , Rfl:  
  lovastatin (MEVACOR) 20 MG tablet, Take 20 mg by mouth nightly., Disp: , Rfl:  
  metaxalone (SKELAXIN) 800 MG tablet, Take 1 tablet (800 mg total) by mouth 3 (three) times a day as needed for muscle spasms. , Disp: 90 tablet, Rfl: 2 
  metFORMIN (GLUMETZA) 500 MG 24 hr tablet, Take 500 mg by mouth daily with breakfast., Disp: , Rfl:  
  tiZANidine (ZANAFLEX) 4 MG tablet, Take 1 tablet (4 mg total) by mouth every 8 (eight) hours as needed for muscle spasms, Disp: 90 tablet, Rfl: 11 
  
     
Allergies Allergen Reactions  Aspirin Nausea Only  
    Other reaction(s): Nausea Only  Penicillins Rash  
  
  
ROS:  
No new bowel or bladder incontinence. No fever. No saddle anesthesia. 
  
Objective:  
  
   
Vitals:  
  09/19/18 1409 BP: (!) 132/92  
  
  
Body mass index is 31.12 kg/m². , a BMI over 30 is considered obese and a BMI over 40 has been associated with a higher risk of surgical complications. 
  
Constitutional: No acute distress. Well nourished. HEENT: Normocephalic. Respiratory:  No labored breathing. Cardiovascular:  No marked cyanosis. Skin:  No marked skin ulcers/lesions on bilateral upper or lower extremities. Psychiatric: Alert and oriented x3. Inspection: No gross deformity of bilateral upper or lower extremities. Musculoskeletal/Neurological:  
Gait/Balance: - Normal.  No instability tandem walking. Neck: 
- No tenderness to palpation - Full range of motion Right upper extremity: 
- No tenderness to palpation - Full range of motion - Strength: 
- 5 out of 5 to deltoid - 5 out of 5 to biceps - 5 out of 5 to wrist extensors - 5 out of 5 to triceps - 5 out of 5 to finger flexors - 5 out of 5 to intrinsics Left upper extremity: 
- No tenderness to palpation - Full range of motion - Strength: 
- 5 out of 5 to deltoid - 5 out of 5 to biceps - 5 out of 5 to wrist extensors - 5 out of 5 to triceps - 5 out of 5 to finger flexors - 5 out of 5 to intrinsics Sensation: - Intact to light touch Reflexes: 
- +2 biceps  
- +2 brachioradialis 
- +2 triceps Negative Marlo's bilaterally.   
  
  
  
Radiographs:   
  
  
 No imaging obtained  
  
Assessment:  
  
    ICD-10-CM 1. Cervical spondylosis with myelopathy M47.12  
2. Displacement of cervical intervertebral disc without myelopathy M50.20 3. Spinal stenosis in cervical region M48.02  
4. Cervicalgia M54.2 5. Cervical radiculopathy M54.12  
  
  
Plan:  
  
Ms. Barry Dunn brought in her family today and they had multiple questions about her diagnosis and proposed surgical intervention. I answered their questions and she would like to proceed with her C3-C5 ACDF with a shoreline device. Follow up 2 weeks after surgery. 
  
I have discussed the procedure in detail with the patient and mentioned complications, including but not limited to: death, permanent disability, heart attack, stroke, lung injury or infection, blindness, ileus, bladder or bowel problems, ureter injury, bleeding, nerve injury (including numbness, pain and weakness), paralysis (which may be permanent), failure to heal, failure to fuse bone together in fusion procedures, failure to relief symptoms, failure to relief pain, increased pain, need for further surgeries, failure or breakage or hardware, malpositioning of hardware, need to fuse or operate on additional levels determined either during or after surgery, destabilization of the spine (which may require fusion or later surgery), infections (which may or may not require additional surgery), dural tears (tears of the sac holding in nerves and spinal fluid), meningitis, voice changes, vocal cord injury, hoarseness, blood clots, pulmonary embolus, Xavi syndrome, recurrent disc herniation, diaphragm paralysis, and anesthetic complications.  Comorbidities such as obesity, smoking, rheumatoid arthritis, chronic steroid use and diabetes increase these risks. The patient understands and wants to proceed.  
  
  
  
   
Orders Placed This Encounter  BMI >=25 PATIENT INSTRUCTIONS & EDUCATION  
 BP Elevated Patient Education & Instructions Return for Follow up 2 weeks after surgery.  
  
  
Charting performed by Daniella Villarreal in the presence of Lani Tipton MD. 
  
I, Lani Tipton MD, personally performed the services described in this documentation, as recorded by the scribe in my presence and it accurately and completely records my words and actions. 
  
This note has been transcribed electronically using voice recognition and a trained scribe. It is believed to be accurate, but may contain errors secondary to technological limitations and other factors.

## 2018-10-30 NOTE — ANESTHESIA PREPROCEDURE EVALUATION
Anesthetic History No history of anesthetic complications Review of Systems / Medical History Patient summary reviewed, nursing notes reviewed and pertinent labs reviewed Pulmonary Within defined limits Neuro/Psych Within defined limits Cardiovascular Within defined limits Hypertension CAD and cardiac stents Exercise tolerance: >4 METS Comments: cardiac clear ence on chart, stopped plavix 7 d ago per cardiac md   
GI/Hepatic/Renal 
Within defined limits GERD Endo/Other Within defined limits Diabetes Hypothyroidism Arthritis Other Findings Physical Exam 
 
Airway Mallampati: II 
 
 
 
 
 Cardiovascular Dental 
 
Dentition: Poor dentition Pulmonary Abdominal 
 
 
 
 Other Findings Anesthetic Plan ASA: 3 Anesthesia type: general 
 
 
 
 
 
 
 
glidescope

## 2018-10-30 NOTE — PERIOP NOTES
TRANSFER - OUT REPORT: 
 
Verbal report given to Valery Cordero RN on Justyn Guadalupe  being transferred to [facility] [phone] for routine post - op Report consisted of patients Situation, Background, Assessment and  
Recommendations(SBAR). Information from the following report(s) OR Summary, Procedure Summary, Intake/Output and MAR was reviewed with the receiving nurse. Opportunity for questions and clarification was provided. Patient transported with: 
 O2 @ 3 liters Tech

## 2018-10-30 NOTE — PROGRESS NOTES
Ortho/ NeuroSurgery NP Note POD# 0  s/p C3-C5 ANTERIOR CERVICAL DISCECTOMY WITH FUSION Pt resting in bed. No complaints. Pre-op had right arm pain which began to present in the left arm intermittently as well. This seems to be improved. VSS Afebrile. Patient has had something to eat/drink. No nausea. Most Recent Labs:  
Lab Results Component Value Date/Time HGB 10.6 (L) 10/16/2018 10:25 AM  
 Hemoglobin A1c 7.1 (H) 10/16/2018 10:25 AM  
 
 
Body mass index is 30.31 kg/m². Reference: BMI greater than 30 is classified as obesity and greater than 40 is classified as morbid obesity. STOP BANG Score: 2 Voiding status: Patient is voiding in adequate amounts. Dressing c.d.i Calves soft and supple; No pain with passive stretch Sensation and motor intact SCDs for mechanical DVT proph Plan: 
1) PT BID starting tomorrow 2) Arlyn-op Antibiotics Vancomycin 3) Discharge plans to home with her daughter.   
 
Amanda Ramirez NP

## 2018-10-31 ENCOUNTER — APPOINTMENT (OUTPATIENT)
Dept: GENERAL RADIOLOGY | Age: 63
DRG: 472 | End: 2018-10-31
Attending: ORTHOPAEDIC SURGERY
Payer: MEDICARE

## 2018-10-31 PROBLEM — M48.02 CERVICAL STENOSIS OF SPINE: Status: ACTIVE | Noted: 2018-10-31

## 2018-10-31 LAB
GLUCOSE BLD STRIP.AUTO-MCNC: 173 MG/DL (ref 65–100)
GLUCOSE BLD STRIP.AUTO-MCNC: 202 MG/DL (ref 65–100)
GLUCOSE BLD STRIP.AUTO-MCNC: 236 MG/DL (ref 65–100)
GLUCOSE BLD STRIP.AUTO-MCNC: 261 MG/DL (ref 65–100)
SERVICE CMNT-IMP: ABNORMAL

## 2018-10-31 PROCEDURE — 93005 ELECTROCARDIOGRAM TRACING: CPT

## 2018-10-31 PROCEDURE — 82962 GLUCOSE BLOOD TEST: CPT

## 2018-10-31 PROCEDURE — 74011636637 HC RX REV CODE- 636/637: Performed by: ORTHOPAEDIC SURGERY

## 2018-10-31 PROCEDURE — G8988 SELF CARE GOAL STATUS: HCPCS

## 2018-10-31 PROCEDURE — 74011250636 HC RX REV CODE- 250/636: Performed by: ORTHOPAEDIC SURGERY

## 2018-10-31 PROCEDURE — 97165 OT EVAL LOW COMPLEX 30 MIN: CPT

## 2018-10-31 PROCEDURE — 94760 N-INVAS EAR/PLS OXIMETRY 1: CPT

## 2018-10-31 PROCEDURE — 97116 GAIT TRAINING THERAPY: CPT

## 2018-10-31 PROCEDURE — 74011250637 HC RX REV CODE- 250/637: Performed by: NURSE PRACTITIONER

## 2018-10-31 PROCEDURE — 99218 HC RM OBSERVATION: CPT

## 2018-10-31 PROCEDURE — 97535 SELF CARE MNGMENT TRAINING: CPT

## 2018-10-31 PROCEDURE — 77030011943

## 2018-10-31 PROCEDURE — 51798 US URINE CAPACITY MEASURE: CPT

## 2018-10-31 PROCEDURE — 74011000250 HC RX REV CODE- 250: Performed by: ORTHOPAEDIC SURGERY

## 2018-10-31 PROCEDURE — 65270000029 HC RM PRIVATE

## 2018-10-31 PROCEDURE — G8987 SELF CARE CURRENT STATUS: HCPCS

## 2018-10-31 PROCEDURE — 74011250637 HC RX REV CODE- 250/637: Performed by: INTERNAL MEDICINE

## 2018-10-31 PROCEDURE — 72040 X-RAY EXAM NECK SPINE 2-3 VW: CPT

## 2018-10-31 PROCEDURE — 97530 THERAPEUTIC ACTIVITIES: CPT

## 2018-10-31 PROCEDURE — 74011250637 HC RX REV CODE- 250/637: Performed by: ORTHOPAEDIC SURGERY

## 2018-10-31 RX ORDER — AMOXICILLIN 250 MG
1 CAPSULE ORAL DAILY
Qty: 30 TAB | Refills: 0 | Status: SHIPPED | OUTPATIENT
Start: 2018-10-31 | End: 2019-05-01 | Stop reason: CLARIF

## 2018-10-31 RX ORDER — POLYETHYLENE GLYCOL 3350 17 G/17G
17 POWDER, FOR SOLUTION ORAL
Qty: 15 PACKET | Refills: 0 | Status: SHIPPED | OUTPATIENT
Start: 2018-10-31 | End: 2018-11-15

## 2018-10-31 RX ORDER — CARVEDILOL 6.25 MG/1
6.25 TABLET ORAL 2 TIMES DAILY WITH MEALS
Status: DISCONTINUED | OUTPATIENT
Start: 2018-10-31 | End: 2018-11-01 | Stop reason: HOSPADM

## 2018-10-31 RX ORDER — LORAZEPAM 1 MG/1
1 TABLET ORAL 2 TIMES DAILY
Status: DISCONTINUED | OUTPATIENT
Start: 2018-10-31 | End: 2018-11-01 | Stop reason: HOSPADM

## 2018-10-31 RX ORDER — HYDROCODONE BITARTRATE AND ACETAMINOPHEN 5; 325 MG/1; MG/1
1-2 TABLET ORAL
Qty: 80 TAB | Refills: 0 | Status: SHIPPED | OUTPATIENT
Start: 2018-10-31 | End: 2019-01-29

## 2018-10-31 RX ADMIN — INSULIN LISPRO 2 UNITS: 100 INJECTION, SOLUTION INTRAVENOUS; SUBCUTANEOUS at 12:01

## 2018-10-31 RX ADMIN — INSULIN LISPRO 2 UNITS: 100 INJECTION, SOLUTION INTRAVENOUS; SUBCUTANEOUS at 18:09

## 2018-10-31 RX ADMIN — LEVOTHYROXINE SODIUM 112 MCG: 112 TABLET ORAL at 04:16

## 2018-10-31 RX ADMIN — SODIUM CHLORIDE 125 ML/HR: 900 INJECTION, SOLUTION INTRAVENOUS at 04:17

## 2018-10-31 RX ADMIN — Medication 10 ML: at 18:10

## 2018-10-31 RX ADMIN — HYDROCODONE BITARTRATE AND ACETAMINOPHEN 2 TABLET: 5; 325 TABLET ORAL at 12:00

## 2018-10-31 RX ADMIN — STANDARDIZED SENNA CONCENTRATE AND DOCUSATE SODIUM 1 TABLET: 8.6; 5 TABLET, FILM COATED ORAL at 08:29

## 2018-10-31 RX ADMIN — CLOPIDOGREL BISULFATE 75 MG: 75 TABLET ORAL at 08:29

## 2018-10-31 RX ADMIN — Medication 5 ML: at 20:46

## 2018-10-31 RX ADMIN — DULOXETINE 60 MG: 30 CAPSULE, DELAYED RELEASE ORAL at 08:29

## 2018-10-31 RX ADMIN — INSULIN GLARGINE 18 UNITS: 100 INJECTION, SOLUTION SUBCUTANEOUS at 18:09

## 2018-10-31 RX ADMIN — FAMOTIDINE 20 MG: 20 TABLET ORAL at 18:08

## 2018-10-31 RX ADMIN — CARVEDILOL 6.25 MG: 6.25 TABLET, FILM COATED ORAL at 18:08

## 2018-10-31 RX ADMIN — LORAZEPAM 1 MG: 1 TABLET ORAL at 18:08

## 2018-10-31 RX ADMIN — HYDROCODONE BITARTRATE AND ACETAMINOPHEN 2 TABLET: 5; 325 TABLET ORAL at 20:48

## 2018-10-31 RX ADMIN — CYCLOBENZAPRINE HYDROCHLORIDE 10 MG: 10 TABLET, FILM COATED ORAL at 20:48

## 2018-10-31 RX ADMIN — STANDARDIZED SENNA CONCENTRATE AND DOCUSATE SODIUM 1 TABLET: 8.6; 5 TABLET, FILM COATED ORAL at 18:08

## 2018-10-31 RX ADMIN — INSULIN LISPRO 2 UNITS: 100 INJECTION, SOLUTION INTRAVENOUS; SUBCUTANEOUS at 08:26

## 2018-10-31 RX ADMIN — POLYETHYLENE GLYCOL 3350 17 G: 17 POWDER, FOR SOLUTION ORAL at 08:30

## 2018-10-31 RX ADMIN — DEXAMETHASONE SODIUM PHOSPHATE 10 MG: 4 INJECTION, SOLUTION INTRAMUSCULAR; INTRAVENOUS at 08:30

## 2018-10-31 RX ADMIN — Medication 10 ML: at 06:25

## 2018-10-31 RX ADMIN — GLIMEPIRIDE 4 MG: 1 TABLET ORAL at 06:30

## 2018-10-31 RX ADMIN — CARVEDILOL 3.12 MG: 3.12 TABLET, FILM COATED ORAL at 08:29

## 2018-10-31 RX ADMIN — HYDROCODONE BITARTRATE AND ACETAMINOPHEN 2 TABLET: 5; 325 TABLET ORAL at 02:11

## 2018-10-31 RX ADMIN — PRAVASTATIN SODIUM 40 MG: 40 TABLET ORAL at 20:48

## 2018-10-31 RX ADMIN — HYDROCODONE BITARTRATE AND ACETAMINOPHEN 2 TABLET: 5; 325 TABLET ORAL at 06:21

## 2018-10-31 RX ADMIN — ASPIRIN 81 MG: 81 TABLET ORAL at 08:29

## 2018-10-31 RX ADMIN — FAMOTIDINE 20 MG: 20 TABLET ORAL at 08:30

## 2018-10-31 RX ADMIN — CYCLOBENZAPRINE HYDROCHLORIDE 10 MG: 10 TABLET, FILM COATED ORAL at 11:59

## 2018-10-31 RX ADMIN — METFORMIN HYDROCHLORIDE 500 MG: 500 TABLET, FILM COATED ORAL at 08:29

## 2018-10-31 RX ADMIN — METFORMIN HYDROCHLORIDE 500 MG: 500 TABLET, FILM COATED ORAL at 18:08

## 2018-10-31 RX ADMIN — VITAMIN D, TAB 1000IU (100/BT) 2000 UNITS: 25 TAB at 08:29

## 2018-10-31 NOTE — PROGRESS NOTES
Ms. Avril Fitzpatrick follows with [facility] for cardiology. Have requested the RN to transfer the consult to their service.    
 
 
Harley Bray NP DNP, RN, AGAP-BC

## 2018-10-31 NOTE — PROGRESS NOTES
Bedside and Verbal shift change report given to [address] [place] [address] [address] (oncoming nurse) by Sofiya Solo RN (offgoing nurse). Report included the following information SBAR, Kardex, Procedure Summary, Intake/Output and Recent Results.

## 2018-10-31 NOTE — OP NOTES
[address]  OPERATIVE REPORT    Name:Fang AWAD  MR#: [id number]  : 1955  ACCOUNT #: [de-identified]   DATE OF SERVICE: 10/30/2018    PREOPERATIVE DIAGNOSES:  1. Cervical spinal stenosis. 2.  Cervical myelopathy. 3.  Cervical radiculopathy. 4.  Cervicalgia. POSTOPERATIVE DIAGNOSES:  1. Cervical spinal stenosis. 2.  Cervical myelopathy. 3.  Cervical radiculopathy. 4.  Cervicalgia. PROCEDURES PERFORMED:  1. C3-C5 anterior cervical diskectomy and fusion. 2.  C3-C5 anterior instrumentation. 3.  C3 through C5 application of anterior biomechanical device. 4.  Use of operative microscope. 5.  Allograft bone for spine fusion. SURGEON:  Meredith Meigs, MD  FIRST ASSISTANT:  Payton Ramon PA-C    ESTIMATED BLOOD LOSS:  75 mL. COMPLICATIONS:  None. SPECIMENS REMOVED:  None. ANESTHESIA:  General.    DRAINS:  x1. IMPLANTS:  Nanovis FortiCore interbody mechanical device and an Nanovis Forti-Bridge anterior cervical plate. INDICATIONS FOR PROCEDURE:  The patient is a pleasant 68-year-old lady with cervical spinal stenosis and myelopathy. At this point, she would like to proceed with surgical intervention. I have given her warnings about the possible complications including but not limited to pain, scar, bleeding, infection, nonunion, damage to surrounding structures, death, paralysis, blindness, stroke. She understands and wants to proceed. DESCRIPTION OF PROCEDURE:  Informed consent was obtained and the operative site was properly marked. The patient was rolled back to the operating room and underwent general endotracheal anesthesia. She was placed supine on the operating table using the [place] Hazy table flat top. Her arms were  well padded and tucked at the side and knees were gently bent distal.  Fluoroscopy was used to tate the level of the incision. We then proceeded to prep and drape in the usual manner.   Timeout was obtained verifying that this was the correct patient, the correct surgery, the correct site, as well as that she had received antibiotics within 30 minutes of the incision. I then proceeded to perform my standard anterior approach to the cervical spine, exposing the area between C5 and C3. Once the area was exposed and hemostasis obtained, the longus colli muscles elevated on both right and left sides protecting the sympathetic chain. A self-retaining retractor was placed into position and [place] pins were placed in C3 and C4. I used gentle axial distraction and then brought in the operating microscope. A 15 blade was used to perform a box annulotomy. The annulus was removed with a pituitary. I proceeded then to remove the disk and cartilaginous endplate with a pituitary and a curet until we reached the PLL. Once we reached the PLL I proceeded to use a Midas Андрей to make the end plate's coplanar and I then proceeded to size for interbody space. While this space was being packed with bone, I removed the PLL with a Kerrison #1 followed by Kerrison #2. Once that area was fully decompressed a spacer was inserted and the retractors were moved down to the C4-5 level where the procedure was repeated in the exact same manner. With both levels completed, I selected for a plate and then drilled for screws bilaterally at C3, C4, C5,  final tightened them to the plate and locking with the final locking device. Once this was complete, the wound was irrigated with normal saline. Hemostasis was obtained. A deep drain was placed. The platysma was closed with 2-0 Vicryl, subcu with 3-0 Vicryl and the skin with a 3-0 running Monocryl and Dermabond. Sterile dressing was applied. Patient was then awakened and transferred to PACU in stable condition. POSTOPERATIVE PLAN:  The patient is going to remain here overnight. We are going to give her SCDs and BEREKET hose for DVT prophylaxis and Ancef for infection prophylaxis.       Saige Queen MD       AR / MN  D: 10/30/2018 13:21     T: 10/30/2018 20:09  JOB #: [id number]

## 2018-10-31 NOTE — CONSULTS
Consult/Admission    NAME: Brandyn Catherine   :  1955   MRN:  [id number]     Date/Time:  10/31/2018 5:45 PM    Patient PCP: Romeo Mcguire MD  ________________________________________________________________________     Assessment:     Sinus Tachycardia   S/p cervical spine surgery. CAD   S/p PIC of LAD and RCA 3/2018  Diabetes type 2  Obesity   Anxiety           Plan: Will increase does of Coreg for rate management. Anxiolytic. [x]           High complexity decision making was performed        Subjective:   CHIEF COMPLAINT:  Tachycardia. HISTORY OF PRESENT ILLNESS:     Sherice Meyers is a 70 y.o.  female who had cervical spine surgery yesterday. Post op today she has sinus tachycardia. No chest pain . No SOB    She is anxioius, says she didn't sleep well last night, etc .      EKG is sinus tachy    She does not have any leg edema or pain and no significant SOB to suggest PE  . Don't think we need CTA , but that would be a consideration. No new cardiac issues. Would increase her Coreg and see how she does . If rate is reasonable and nothing else turns up , then probable discharge tomorrow. We were asked to admit for work up and evaluation of the above problems.      Past Medical History:   Diagnosis Date    Arthritis     CAD (coronary artery disease)     6 st[facility]CARMITA) [facility]    Diabetes ([facility] [address]     Dizziness     Dyspepsia and other specified disorders of function of stomach     GERD (gastroesophageal reflux disease)     Headache(784.0)     Hx of cardiac cath 2018    after abnormal stress test 3/13/18    Hypertension     Loss of appetite     Musculoskeletal disorder     back pain    Other ill-defined conditions(799.89)     shingles    Thyroid disease     hypothyroidism      Past Surgical History:   Procedure Laterality Date     [facility]    hysterectomy & 2 c-sections    HX HEENT  1963    tonsils    HX ORTHOPAEDIC Bilateral     carpal tunnel release    HX ORTHOPAEDIC      lower back surgery    HX OTHER SURGICAL      sweat glands removed @ [facility]    HX OTHER SURGICAL      abscess removed from right back    RI DRAIN SKIN ABSCESS SIMPLE  12     Allergies   Allergen Reactions    Penicillins Rash      Meds:  See below  Social History     Tobacco Use    Smoking status: Former Smoker     Packs/day: 0.50     Years: 25.00     Pack years: 12.50     Last attempt to quit: 1999     Years since quittin.8    Smokeless tobacco: Never Used   Substance Use Topics    Alcohol use: Yes     Alcohol/week: 1.8 oz     Types: 3 Cans of beer per week     Comment: very rare      Family History   Problem Relation Age of Onset    Diabetes Mother     Hypertension Mother     Hypertension Father     Diabetes Father     Stroke Father     Heart Attack Father     Diabetes Sister     Cancer Sister         lung       REVIEW OF SYSTEMS:     []            Unable to obtain  ROS due to ---   [x]            Total of 12 systems reviewed as follows:    Constitutional: negative fever, negative chills, negative weight loss  Eyes:   negative visual changes  ENT:   negative sore throat, tongue or lip swelling  Respiratory:  negative cough, negative dyspnea  Cards:  negative for chest pain, palpitations, lower extremity edema  GI:   negative for nausea, vomiting, diarrhea, and abdominal pain  Genitourinary: negative for frequency, dysuria  Integument:  negative for rash   Hematologic:  negative for easy bruising and gum/nose bleeding  Musculoskel: negative for myalgias,  back pain  Neurological:  negative for headaches, dizziness, vertigo, weakness  Behavl/Psych: negative for feelings of anxiety, depression     Pertinent Positives include :    Objective:      Physical Exam:    Last 24hrs VS reviewed since prior progress note.  Most recent are:    Visit Vitals  /88 (BP Patient Position: Sitting)   Pulse (!) 120   Temp 98.3 °F (36.8 °C)   Resp 18   Ht 5' 5.5\" (1.664 m)   Wt 83.9 kg (184 lb 15.5 oz)   SpO2 100%   BMI 30.31 kg/m²       Intake/Output Summary (Last 24 hours) at 10/31/2018 1745  Last data filed at 10/31/2018 1232  Gross per 24 hour   Intake    Output 1400 ml   Net -1400 ml        General Appearance: Well developed, well nourished, alert & oriented x 3,    no acute distress. Ears/Nose/Mouth/Throat: Pupils equal and round, Hearing grossly normal.  Neck: Supple. JVP within normal limits. Carotids good upstrokes, with no bruit. Chest: Lungs clear to auscultation bilaterally. Cardiovascular: Regular rate and rhythm, S1S2 normal, no murmur, rubs, gallops. Abdomen: Soft, non-tender, bowel sounds are active. No organomegaly. Extremities: No edema bilaterally. Femoral pulses +2, Distal Pulses +1. Skin: Warm and dry. Neuro: CN II-XII grossly intact, Strength and sensation grossly intact. Data:      Prior to Admission medications    Medication Sig Start Date End Date Taking? Authorizing Provider   HYDROcodone-acetaminophen (NORCO) 5-325 mg per tablet Take 1-2 Tabs by mouth every four (4) hours as needed. Max Daily Amount: 12 Tabs. If insurance prior auth./quantity restrictions apply, refer to and look up diagnosis code one prescription. Partial fill as needed is permitted. Please do not contact prescriber. 10/31/18  Yes Dane De Leon NP   polyethylene glycol (MIRALAX) 17 gram packet Take 1 Packet by mouth daily as needed (constipation) for up to 15 days. 10/31/18 11/15/18 Yes Dane De Leon NP   senna-docusate (PERICOLACE) 8.6-50 mg per tablet Take 1 Tab by mouth daily. 10/31/18  Yes Dane De Leon NP   cholecalciferol, vitamin D3, (VITAMIN D3) 2,000 unit tab Take  by mouth daily. Yes Provider, Historical   lovastatin (MEVACOR) 40 mg tablet Take 1 Tab by mouth nightly. 10/16/18  Yes Jaylen Araya MD   raNITIdine (ZANTAC) 300 mg tab Take 300 mg by mouth daily.    Yes Provider, Historical   clopidogrel (PLAVIX) 75 mg tab Take 75 mg by mouth daily. Yes Provider, Historical   doxycycline (VIBRAMYCIN) 100 mg capsule Take 1 Cap by mouth two (2) times a day. Patient taking differently: Take 100 mg by mouth two (2) times daily as needed. Indications: boils 9/27/18  Yes Cyndy Rahman MD   DULoxetine (CYMBALTA) 60 mg capsule Take 60 mg by mouth daily. Yes Provider, Historical   traMADol (ULTRAM) 50 mg tablet TAKE 1 TABLET BY MOUTH EVERY 8 HOURS AS NEEDED FOR PAIN. MAX DAILY AMOUNT: 150 MG (3 TABLETS) 9/4/18  Yes Cyndy Rahman MD   levothyroxine (SYNTHROID) 112 mcg tablet Take 1 Tab by mouth Daily (before breakfast). 8/21/18  Yes Cyndy Rahman MD   tiZANidine (ZANAFLEX) 4 mg tablet Take 4 mg by mouth three (3) times daily as needed. Yes Provider, Historical   glucose blood VI test strips (RELION PRIME TEST STRIPS) strip by Does Not Apply route See Admin Instructions. Test blood sugar levels twice a day. DX:E11.9   Yes Provider, Historical   Blood-Glucose Meter (RELION PRIME METER) misc by Does Not Apply route. Yes Provider, Historical   metFORMIN (GLUCOPHAGE) 500 mg tablet Take 1 Tab by mouth two (2) times daily (with meals). 8/20/18  Yes Cyndy Rahman MD   aspirin delayed-release 81 mg tablet Take 81 mg by mouth daily. Yes Other, MD [place]   glimepiride (AMARYL) 4 mg tablet Take 4 mg by mouth every morning. Yes Provider, Historical   insulin NPH/insulin regular (NOVOLIN 70/30 U-100 INSULIN) 100 unit/mL (70-30) injection by SubCUTAneous route. Indications: takes 17 units in the morning and 15 units at dinner   Yes Provider, Historical   carvedilol (COREG) 3.125 mg tablet Take  by mouth two (2) times daily (with meals). Yes Provider, Historical   naproxen (NAPROSYN) 500 mg tablet Take 1 Tab by mouth two (2) times daily (with meals). Patient taking differently: Take 500 mg by mouth two (2) times daily as needed.  5/15/15  Yes Deepak Yoder MD   Ibuprofen-diphenhydrAMINE 200-38 mg tab Take 2 Tabs by mouth nightly as needed.     Provider, Historical       Recent Results (from the past 24 hour(s))   GLUCOSE, POC    Collection Time: 10/31/18  7:59 AM   Result Value Ref Range    Glucose (POC) 173 (H) 65 - 100 mg/dL    Performed by Lashawn Yepez    GLUCOSE, POC    Collection Time: 10/31/18 12:06 PM   Result Value Ref Range    Glucose (POC) 236 (H) 65 - 100 mg/dL    Performed by Tatianna NAGY [address] [place] [address], POC    Collection Time: 10/31/18  4:41 PM   Result Value Ref Range    Glucose (POC) 261 (H) 65 - 100 mg/dL    Performed by Neto Molina

## 2018-10-31 NOTE — PHYSICIAN ADVISORY
Letter of Status Determination:  
Recommend hospitalization status upgraded from OBSERVATION  to INPATIENT  Status Pt Name:  Leeroy Valdez MR#  
JANE # [id number] / 
[id number] Payor: Vicky Smith / Plan: [address] / Product Type: Medicare /   
[facility]#  [id number] Room and Hospital  3223/01  @ [facility] Hospitalization date  10/30/2018  5:42 AM  
Current Attending Physician  Bulmaro Barth MD  
Principal diagnosis  <principal problem not specified> Cervical radiculopathy Clinicals  70 y.o. y.o  female hospitalized with above diagnosis The pt went through planned surgery without any surgical complications. At this time we see that the surgery team is seeking cardiologist evaluation for persistent tachycardia. In this regard, we anticipate her care in acute care setting will appropriately extend beyond 2 midnights. [place] ([facility] criteria Does  NOT apply STATUS DETERMINATION  This patient is at high risk of adverse events and deterioration based on documented clinical data, comorbid conditions and current acute care course. Ms. Leeroy Valdez is expected to meet Inpatient Admission status criteria in accordance with CMS regulation Section 43 .3. Specifically, due to medical necessity the patient's stay is expected to exceed Two Midnights. It is our recommendation that this patient's hospitalization status should be upgraded from  OBSERVATION to INPATIENT status. The final decision of the patient's hospitalization status depends on the attending physician's judgment. Additional comments Payor: Vicky Smith / Plan: [address] / Product Type: Medicare /   
  
 
Chuck Lopez MD MPH FACP Cell: [phone] Physician Advisor [address] [id number] [address] [facility]  
President Medical Staff, [address] [place] [address]   
Cell  [phone]   
 
 
[phone] Ras Kim

## 2018-10-31 NOTE — PROGRESS NOTES
Ortho/ NeuroSurgery NP Note 
 
s/p C3-C5 ANTERIOR CERVICAL DISCECTOMY WITH FUSION on 10/30/2018 Pt resting in bed. No complaints. VSS Afebrile. Tachy since yesterday despite beta-blocker. + voiding. +PO intake. Labs Lab Results Component Value Date/Time HGB 10.6 (L) 10/16/2018 10:25 AM  
  
 
Body mass index is 30.31 kg/m². Reference: BMI greater than 30 is classified as obesity and greater than 40 is classified as morbid obesity. Dressing c.d.i, collar Drain  in place. Calves soft and supple; No pain with passive stretch Sensation and motor intact SCDs for mechanical DVT proph while in bed PLAN: 
1) PT BID 2) Tachycardic- will discuss with Dr. Julito Chowdhury. 3) Readiness for discharge: 
   [] Vital Signs stable  
 [x] Hgb stable  
 [x] + Voiding  
 [x] Incision intact, drainage minimal  
 [x] Tolerating PO intake [x] Cleared by PT (OT if applicable) [x] Adequate pain control on oral medication alone Bowen Bear NP

## 2018-10-31 NOTE — PROGRESS NOTES
Problem: Self Care Deficits Care Plan (Adult) Goal: *Acute Goals and Plan of Care (Insert Text) Occupational Therapy Goals Initiated 10/31/2018 1. Patient will perform lower body dressing with modified independence using within 7 days. 2.  Patient will perform upper body dressing with independence within 7 days. 3.  Patient will standing ADLs 5 mins at modified independence with RW within 7 days. 4.  Patient will don/doff neck brace at independence within 7 days. 5.  Patient will verbalize/demonstrate 3/3 cervical precautions during ADL tasks without cues within  7 days. Occupational Therapy EVALUATION Patient: Kavya Winchester (10 y.o. female) Date: 10/31/2018 Primary Diagnosis: CERVICAL RADICULOPATHY, SPONDYLOTHESIS WITH MYELOPATHY, CERVICAL STENOSIS Cervical stenosis of spinal canal 
Procedure(s) (LRB): 
C3-C5 ANTERIOR CERVICAL DISCECTOMY WITH FUSION (N/A) 1 Day Post-Op Precautions:   Spinal 
 
ASSESSMENT : 
Based on the objective data described below, the patient presents with decreased safety awareness, slight impulsivity 2* urinary urgency, need for RW support, cervical precautions and mild decline in functional status s/p C3-5 ACDF POD 1. Pt received in chair, educated on 3/3 cervical neck precautions and implications for ADLs. Pt demonstrated compensatory technique for cross leg for LB dressing. Pt with urge to use bathroom, required cues for safety with RW during bathroom mobility 2* impulsivity. Reviewed donning/doffing neck brace with pt and daughter in prep for bathing tasks. Educated pt on home safety and modifications for fall prevention and ADL independence. Pt and daughter indicated understanding to all education. Recommend discharge home with HHPT and RW. Pt is safer with use of RW compared to her rollator. Patient will benefit from skilled intervention to address the above impairments. Patients rehabilitation potential is considered to be Good Factors which may influence rehabilitation potential include:  
[x]             None noted []             Mental ability/status []             Medical condition []             Home/family situation and support systems []             Safety awareness []             Pain tolerance/management 
[]             Other: PLAN : 
Recommendations and Planned Interventions: 
[x]               Self Care Training                  [x]        Therapeutic Activities [x]               Functional Mobility Training    []        Cognitive Retraining 
[x]               Therapeutic Exercises           [x]        Endurance Activities [x]               Balance Training                   []        Neuromuscular Re-Education []               Visual/Perceptual Training     [x]   Home Safety Training 
[x]               Patient Education                 [x]        Family Training/Education []               Other (comment): Frequency/Duration: Patient will be followed by occupational therapy 4 times a week to address goals. Discharge Recommendations: Home Health Further Equipment Recommendations for Discharge: rolling walker SUBJECTIVE:  
Patient stated I understand what you are saying.  OBJECTIVE DATA SUMMARY:  
HISTORY:  
Past Medical History:  
Diagnosis Date  Arthritis  CAD (coronary artery disease) 6 stents ([facility]) 3/21/18 at [facility]  Diabetes ([address]  Dizziness  Dyspepsia and other specified disorders of function of stomach  GERD (gastroesophageal reflux disease)  Headache(784.0)  Hx of cardiac cath 08/2018  
 after abnormal stress test 3/13/18  Hypertension  Loss of appetite  Musculoskeletal disorder   
 back pain  Other ill-defined conditions(799.89)   
 shingles  Thyroid disease   
 hypothyroidism Past Surgical History:  
Procedure Laterality Date [address]  
 hysterectomy & 2 c-sections  HX HEENT  1963  
 tonsils  HX ORTHOPAEDIC Bilateral   
 carpal tunnel release  HX ORTHOPAEDIC    
 lower back surgery  HX OTHER SURGICAL  1976  
 sweat glands removed @ [facility]  HX OTHER SURGICAL  2015  
 abscess removed from right back  [place] DRAIN SKIN ABSCESS SIMPLE  1/9/12 Prior Level of Function/Environment/Context: independent Occupations in which the patient is/was successful, what are the barriers preventing that success:  
Performance Patterns (routines, roles, habits, and rituals):  
Personal Interests and/or values:  
Expanded or extensive additional review of patient history:  
 
Home Situation Home Environment: Private residence # Steps to Enter: 5 Rails to Enter: Yes Hand Rails : Bilateral 
One/Two Story Residence: One story Living Alone: No 
Support Systems: Spouse/Significant Other/Partner, Child(delroy) Patient Expects to be Discharged to[de-identified] Private residence Current DME Used/Available at Home: Cane, straight, Walker, rollator, Raised toilet seat, Grab bars Tub or Shower Type: Shower Hand dominance: RightEXAMINATION OF PERFORMANCE DEFICITS: 
Cognitive/Behavioral Status: 
  
  
  
  
  
  
Skin: anterior incision C/D/I Edema: None noted Hearing: 
  
Vision/Perceptual:   
Tracking: Able to track stimulus in all quadrants w/o difficulty Range of Motion: \ 
AROM: Generally decreased, functional 
  
  
  
  
  
  
  
Strength: 
 
Strength: Generally decreased, functional 
  
  
  
  
Coordination: 
Coordination: Within functional limits Fine Motor Skills-Upper: Left Intact; Right Intact Gross Motor Skills-Upper: Left Intact; Right Intact Tone & Sensation: 
 
Tone: Normal 
Sensation: Intact Balance: 
Sitting: Intact Standing: With support; Impaired Standing - Static: Good Standing - Dynamic : Fair Functional Mobility and Transfers for ADLs:Bed Mobility: 
  
 
Transfers: 
Sit to Stand: Modified independent Stand to Sit: Modified independent Toilet Transfer : Stand-by assistance(pt impulsive due to urinary urge) ADL Assessment: 
Feeding: Independent Oral Facial Hygiene/Grooming: Supervision Bathing: Minimum assistance Upper Body Dressing: Supervision Lower Body Dressing: Supervision(cross leg technique) Toileting: Supervision ADL Intervention and task modifications: 
  
 
 educated pt on 3/3 cervical neck precautions, cross leg technique for LB ADLs (which pt demonstrated) and removal of scatter/throw rugs for fall prevention. Instructed pt to use protective undergarments for bladder protection to reduce risk for rushing and falls while mobilizing to bathroom. Functional Measure: 
Barthel Index: 
 
Bathin Bladder: 10 Bowels: 10 
Groomin Dressin Feeding: 10 Mobility: 10 Stairs: 5 Toilet Use: 5 Transfer (Bed to Chair and Back): 10 Total: 70 Barthel and G-code impairment scale: 
Percentage of impairment CH 
0% CI 
1-19% CJ 
20-39% CK 
40-59% CL 
60-79% CM 
80-99% CN 
100% Barthel Score 0-100 100 99-80 79-60 59-40 20-39 1-19 
 0 Barthel Score 0-20 20 17-19 13-16 9-12 5-8 1-4 0 The Barthel ADL Index: Guidelines 1. The index should be used as a record of what a patient does, not as a record of what a patient could do. 2. The main aim is to establish degree of independence from any help, physical or verbal, however minor and for whatever reason. 3. The need for supervision renders the patient not independent. 4. A patient's performance should be established using the best available evidence. Asking the patient, friends/relatives and nurses are the usual sources, but direct observation and common sense are also important. However direct testing is not needed. 5. Usually the patient's performance over the preceding 24-48 hours is important, but occasionally longer periods will be relevant. 6. Middle categories imply that the patient supplies over 50 per cent of the effort. 7. Use of aids to be independent is allowed. Anastasiia Diallo., Barthel, D.W. (7191). Functional evaluation: the Barthel Index. [address] (14)2. Jimbo MONE Kay, Amparo Sanchez.Angela., [place], [address] (1999). Measuring the change indisability after inpatient rehabilitation; comparison of the responsiveness of the Barthel Index and Functional [place] Measure. Journal of Neurology, Neurosurgery, and Psychiatry, 66(4), 376-764. LAN Rivera, YOSSI Merritt, & Balaji Sanchez M.A. (2004.) Assessment of post-stroke quality of life in cost-effectiveness studies: The usefulness of the Barthel Index and the EuroQoL-5D. [facility], 13, 112-65 G codes: In compliance with CMSs Claims Based Outcome Reporting, the following G-code set was chosen for this patient based on their primary functional limitation being treated: The outcome measure chosen to determine the severity of the functional limitation was the barthel index with a score of 70/100 which was correlated with the impairment scale. ? Self Care:  
  - CURRENT STATUS: CJ - 20%-39% impaired, limited or restricted  - GOAL STATUS: CI - 1%-19% impaired, limited or restricted  - D/C STATUS:  ---------------To be determined--------------- Occupational Therapy Evaluation Charge Determination History Examination Decision-Making LOW Complexity : Brief history review  LOW Complexity : 1-3 performance deficits relating to physical, cognitive , or psychosocial skils that result in activity limitations and / or participation restrictions  LOW Complexity : No comorbidities that affect functional and no verbal or physical assistance needed to complete eval tasks Based on the above components, the patient evaluation is determined to be of the following complexity level: LOW Pain: 
Pain Scale 1: Numeric (0 - 10) Pain Intensity 1: 9 Pain Location 1: Neck Pain Orientation 1: Anterior Pain Description 1: Aching Pain Intervention(s) 1: Medication (see MAR) Activity Tolerance: VSS-  at rest, 107 sitting post act Please refer to the flowsheet for vital signs taken during this treatment. After treatment:  
[x] Patient left in no apparent distress sitting up in chair 
[] Patient left in no apparent distress in bed 
[x] Call bell left within reach [x] Nursing notified 
[x] Caregiver present 
[] Bed alarm activated COMMUNICATION/EDUCATION:  
The patients plan of care was discussed with: Physical Therapist and Registered Nurse. 
[] Home safety education was provided and the patient/caregiver indicated understanding. [x] Patient/family have participated as able in goal setting and plan of care. [x] Patient/family agree to work toward stated goals and plan of care. [] Patient understands intent and goals of therapy, but is neutral about his/her participation. [] Patient is unable to participate in goal setting and plan of care. This patients plan of care is appropriate for delegation to [facility]. Thank you for this referral. 
Jennifer Mustafa OT Time Calculation: 23 mins

## 2018-10-31 NOTE — PROGRESS NOTES
Problem: Mobility Impaired (Adult and Pediatric) Goal: *Acute Goals and Plan of Care (Insert Text) Physical Therapy Goals Initiated 10/30/2018 1. Patient will move from supine to sit and sit to supine , scoot up and down and roll side to side in bed with modified independence within 4 days. 2. Patient will perform sit to stand with modified independence within 4 days. 3. Patient will ambulate with modified independence for 250 feet with the least restrictive device within 4 days. 4. Patient will ascend/descend 5 stairs with 1 handrail(s) with modified independence within 4 days. 5. Patient will verbalize and demonstrate understanding of spinal precautions (No bending, lifting greater than 5 lbs, or twisting; log-roll technique; frequent repositioning as instructed) within 4 days. physical Therapy TREATMENT Patient: Caitlin Scott (50 y.o. female) Date: 10/31/2018 Diagnosis: CERVICAL RADICULOPATHY, SPONDYLOTHESIS WITH MYELOPATHY, CERVICAL STENOSIS Cervical stenosis of spinal canal <principal problem not specified> Procedure(s) (LRB): 
C3-C5 ANTERIOR CERVICAL DISCECTOMY WITH FUSION (N/A) 1 Day Post-Op Precautions: Spinal 
Chart, physical therapy assessment, plan of care and goals were reviewed. ASSESSMENT: 
Pt Checo Lucio performs gait training using RW and using rollator and initiates stair training today. Pt demonstrating improved gait stability and requires decreased assistance for ambulation using RW vs. Rollator. Pt requiring SBA for ambulation using RW and for stair negotiation. Family present throughout encounter and demonstrates appropriate cueing and assistance. HR elevated at 118 immediately after activity and decreases to 101 with seated rest. SpO2 99%. Pt demonstrating mobility sufficient for function within home environment and is cleared for discharge from PT perspective. Progression toward goals: 
[x]      Improving appropriately and progressing toward goals []      Improving slowly and progressing toward goals 
[]      Not making progress toward goals and plan of care will be adjusted PLAN: 
Patient continues to benefit from skilled intervention to address the above impairments. Continue treatment per established plan of care. Discharge Recommendations:  Home Health Further Equipment Recommendations for Discharge:  Rolling walker with 5\" wheels - care manager notified. SUBJECTIVE:  
Patient stated I have the one with the seat on it,\" re: rollator use prior to admission. She reports inability to ambulate more than 200' prior to admission due to back pain. The patient stated 3/3 back precautions. Reviewed all 3 with patient. Vista collar intact throughout encounter. OBJECTIVE DATA SUMMARY:  
Critical Behavior: 
Neurologic State: Alert, Appropriate for age Orientation Level: Oriented to person, Oriented to place Cognition: Follows commands, Appropriate for age attention/concentration, Appropriate safety awareness Functional Mobility Training: 
Bed Mobility: 
Pt verbalizes appropriate log roll technique and reports performing prior to admission. She reports sometimes sleeping in recliner chair at baseline. Transfers: 
Sit to Stand: Modified independent Stand to Sit: Modified independent Other: commode with modified independence Balance: 
 Ambulation/Gait Training: 
Distance (ft): (120' using RW; 61' using rollator) Assistive Device: Gait belt Ambulation - Level of Assistance: Stand-by assistance Gait Abnormalities: Decreased step clearance Speed/Jessica: Pace decreased (<100 feet/min) Using rollator walker pt requires min assist for stability and up to mod assist for device use. Using rolling walker pt ambulates with SBA. Stairs: 
Number of Stairs Trained: 4 Stairs - Level of Assistance: Stand-by assistance Rail Use: Both 
Pain: 
Pain Scale 1: Numeric (0 - 10) Pain Intensity 1: 6 Pain Location 1: Neck Pain Orientation 1: Anterior Pain Description 1: Aching Pain Intervention(s) 1: Medication (see MAR) Activity Tolerance: No pt complaints. Discussed HR with ortho NP. Please refer to the flowsheet for vital signs taken during this treatment. After treatment:  
[x]  Patient left in no apparent distress sitting up in chair 
[]  Patient left in no apparent distress in bed 
[x]  Call bell left within reach [x]  Nursing notified 
[x]  Caregiver present 
[]  Bed alarm activated COMMUNICATION/COLLABORATION:  
The patients plan of care was discussed with: Occupational Therapist, Registered Nurse and ortho NP. Laura Mcintosh, PT, DPT Time Calculation: 36 mins

## 2018-10-31 NOTE — PROGRESS NOTES
ORTHO POST OP SPINE PROGRESS NOTE 2018 Admit Date: 10/30/2018 Admit Diagnosis: CERVICAL RADICULOPATHY, SPONDYLOTHESIS WITH MYELOPATHY, CERVICAL STENOSIS Cervical stenosis of spinal canal 
Procedure: Procedure(s): 
C3-C5 ANTERIOR CERVICAL DISCECTOMY WITH FUSION Post Op day: 1 Day Post-Op Subjective:  
 
Scot Reyna is a patient who has complaints Pain in the neck and improved upper extremity symptoms status post see 3 through C5 ACDF. Tolerating p.o. and able to void. Daughter at bedside. Review of Systems: Pertinent items are noted in HPI. Objective:  
 
PT/OT:  
Distance Ambulated:          
Time Ambulated (min):       
Ambulation Response: Activity Response: Fairly tolerated Assistive Device:              Assistive Device: Walker (comment) Vital Signs:   
Blood pressure (!) 155/94, pulse 98, temperature 98.3 °F (36.8 °C), resp. rate 18, height 5' 5.5\" (1.664 m), weight 83.9 kg (184 lb 15.5 oz), SpO2 100 %. Temp (24hrs), Av.9 °F (36.6 °C), Min:97.5 °F (36.4 °C), Max:98.8 °F (37.1 °C) No intake/output data recorded. 10/29 1901 - 10/31 0700 In: 1250 [I.V.:1250] Out: 6865 [Urine:1600; Drains:50] LAB:   
No results for input(s): HGB, HGBEXT, WBC, PLT, PLTEXT, HGBEXT, PLTEXT in the last 72 hours. Wound/Drain Assessment: 
Drain:   
 
Dressing:  
 
Physical Exam: 
Neurological: no deficit Incision clean, dry, and intact 5/5 bilateral upper extremities Assessment:  
  
Patient Active Problem List  
Diagnosis Code  LBP (low back pain) M54.5  DM (diabetes mellitus) ([address] E11.9  Hypercholesterolemia E78.00  
 Diabetes type 2, uncontrolled ([address] E11.65  Diabetic autonomic neuropathy associated with type 2 diabetes mellitus ([address] E11.43  
 Hypovitaminosis D E55.9  Leg pain, bilateral M79.604, M79.605  Essential hypertension I10  
 Hypothyroidism E03.9  Cervical stenosis of spinal canal M48.02 Plan:  
 
Continue PT/OT/Rehab Discontinue: IV 
Consult: PT  and OT Discharge To: Home. Today Signed By: YADI Smith

## 2018-10-31 NOTE — DISCHARGE INSTRUCTIONS
[facility]    Discharge Instruction Sheet: POSTERIOR SPINAL FUSION    DR. Fe Avalos    Pain control:   Typically, we will prescribe a narcotic usually 1-2 tabs every four hours is    sufficient for the pain. Most patients need this only for the first few weeks. You   should discontinue this as the pain decreases. You should not drive while taking any narcotic pain medications. Constipation  Pain medicines and anesthesia can be constipating-this can be prevented by gentle physical activity and drinking plenty of fluid. It should be treated with over-the-counter medications such as Miralax or suppositories, and/or Fleets enema. You should have a bowel movement at least every other day following surgery. Incision care     Keep this area clean and dry. Your dressing is designed to stay in place for 5-7 days. You will be sent home with one additional dressing to change at that time. Leave this new dressing in place until our follow up visit in the office in about 10-14 days. If staples are in place, they should be removed about 14-20 days after surgery. You may shower with this impermeable dressing in place. DO NOT take a tub bath or go swimming until cleared by your doctor. DO NOT apply lotions, oils, or creams to incision. To increase and promote healing:   Stop Smoking (or at least cut back on smoking).  Eat a well-balanced diet (high in protein and vitamin C)   If your appetite is poor, consider nutritional supplements like Ensure, Glucerna, or [place] Instant Breakfast.   If you are diabetic, controlling you blood sugars is very important to prevent infection and promote wound healing. Nutrition:   If you were on a supplement such as Ensure or Glucerna) while in the hospital, please continue using them with each meal for the next 30 days.    Eat a well-balanced diet - High in protein, high in vitamins and minerals, especially vitamin C and zinc.     Restrictions:   Remember your \"BLT's\"    1. Limited bending at waist    2. Lift no more than 10 pounds    3. No Twisting     If you were given a brace, wear it when out of bed. Warning signs : Please call your physician immediately at [phone] if you have   Bleeding from incision that is constant.  Change in mental status (unusual behavior or confusion)   If your incision develops redness or swelling   Change in wound drainage (increase in amount, color, or foul odor)   [place] over 101.5 degrees Fahrenheit    Headache that is not relieved with pain medication   Tenderness or redness in the calf of your leg    Emergency: CALL 911 if you have   Shortness of breath   Chest pain   Localized chest pain when coughing or taking a deep breath    Follow-up  Please call Dr. Bates [facility] office for a follow up appointment in 2 weeks at [phone]. You can return to work when cleared by a physician. During normal business hours you may reach Dr. Hanane Christie' team directly at [phone] if you have concerns or questions.     Lilibeth Pa

## 2018-10-31 NOTE — PROGRESS NOTES
Problem: Falls - Risk of 
Goal: *Absence of Falls Document Ellie Clements Fall Risk and appropriate interventions in the flowsheet. Outcome: Progressing Towards Goal 
Fall Risk Interventions: 
Mobility Interventions: Patient to call before getting OOB Medication Interventions: Evaluate medications/consider consulting pharmacy History of Falls Interventions: Consult care management for discharge planning, Evaluate medications/consider consulting pharmacy, Investigate reason for fall

## 2018-11-01 ENCOUNTER — HOME HEALTH ADMISSION (OUTPATIENT)
Dept: HOME HEALTH SERVICES | Facility: HOME HEALTH | Age: 63
End: 2018-11-01
Payer: MEDICARE

## 2018-11-01 VITALS
OXYGEN SATURATION: 99 % | BODY MASS INDEX: 29.73 KG/M2 | RESPIRATION RATE: 18 BRPM | SYSTOLIC BLOOD PRESSURE: 130 MMHG | HEIGHT: 66 IN | HEART RATE: 99 BPM | DIASTOLIC BLOOD PRESSURE: 71 MMHG | WEIGHT: 184.97 LBS | TEMPERATURE: 98.2 F

## 2018-11-01 LAB
ATRIAL RATE: 106 BPM
ATRIAL RATE: 107 BPM
CALCULATED P AXIS, ECG09: 52 DEGREES
CALCULATED P AXIS, ECG09: 53 DEGREES
CALCULATED R AXIS, ECG10: -3 DEGREES
CALCULATED R AXIS, ECG10: -4 DEGREES
CALCULATED T AXIS, ECG11: -3 DEGREES
CALCULATED T AXIS, ECG11: 3 DEGREES
DIAGNOSIS, 93000: NORMAL
DIAGNOSIS, 93000: NORMAL
GLUCOSE BLD STRIP.AUTO-MCNC: 143 MG/DL (ref 65–100)
GLUCOSE BLD STRIP.AUTO-MCNC: 172 MG/DL (ref 65–100)
P-R INTERVAL, ECG05: 144 MS
P-R INTERVAL, ECG05: 144 MS
Q-T INTERVAL, ECG07: 350 MS
Q-T INTERVAL, ECG07: 352 MS
QRS DURATION, ECG06: 78 MS
QRS DURATION, ECG06: 78 MS
QTC CALCULATION (BEZET), ECG08: 464 MS
QTC CALCULATION (BEZET), ECG08: 469 MS
SERVICE CMNT-IMP: ABNORMAL
SERVICE CMNT-IMP: ABNORMAL
VENTRICULAR RATE, ECG03: 106 BPM
VENTRICULAR RATE, ECG03: 107 BPM

## 2018-11-01 PROCEDURE — 82962 GLUCOSE BLOOD TEST: CPT

## 2018-11-01 PROCEDURE — 74011250637 HC RX REV CODE- 250/637: Performed by: NURSE PRACTITIONER

## 2018-11-01 PROCEDURE — 74011636637 HC RX REV CODE- 636/637: Performed by: ORTHOPAEDIC SURGERY

## 2018-11-01 PROCEDURE — 74011250637 HC RX REV CODE- 250/637: Performed by: INTERNAL MEDICINE

## 2018-11-01 PROCEDURE — 74011250637 HC RX REV CODE- 250/637: Performed by: ORTHOPAEDIC SURGERY

## 2018-11-01 PROCEDURE — 97116 GAIT TRAINING THERAPY: CPT

## 2018-11-01 PROCEDURE — 74011000250 HC RX REV CODE- 250: Performed by: ORTHOPAEDIC SURGERY

## 2018-11-01 PROCEDURE — 97530 THERAPEUTIC ACTIVITIES: CPT

## 2018-11-01 RX ORDER — CARVEDILOL 3.12 MG/1
12.5 TABLET ORAL 2 TIMES DAILY WITH MEALS
Status: SHIPPED | COMMUNITY
Start: 2018-11-01

## 2018-11-01 RX ADMIN — LEVOTHYROXINE SODIUM 112 MCG: 112 TABLET ORAL at 05:32

## 2018-11-01 RX ADMIN — HYDROCODONE BITARTRATE AND ACETAMINOPHEN 2 TABLET: 5; 325 TABLET ORAL at 03:21

## 2018-11-01 RX ADMIN — DULOXETINE 60 MG: 30 CAPSULE, DELAYED RELEASE ORAL at 09:02

## 2018-11-01 RX ADMIN — GLIMEPIRIDE 4 MG: 1 TABLET ORAL at 06:49

## 2018-11-01 RX ADMIN — CARVEDILOL 6.25 MG: 6.25 TABLET, FILM COATED ORAL at 09:02

## 2018-11-01 RX ADMIN — HYDROCODONE BITARTRATE AND ACETAMINOPHEN 1 TABLET: 5; 325 TABLET ORAL at 09:02

## 2018-11-01 RX ADMIN — STANDARDIZED SENNA CONCENTRATE AND DOCUSATE SODIUM 1 TABLET: 8.6; 5 TABLET, FILM COATED ORAL at 09:02

## 2018-11-01 RX ADMIN — FAMOTIDINE 20 MG: 20 TABLET ORAL at 09:02

## 2018-11-01 RX ADMIN — HYDROCODONE BITARTRATE AND ACETAMINOPHEN 2 TABLET: 5; 325 TABLET ORAL at 13:27

## 2018-11-01 RX ADMIN — CLOPIDOGREL BISULFATE 75 MG: 75 TABLET ORAL at 09:02

## 2018-11-01 RX ADMIN — POLYETHYLENE GLYCOL 3350 17 G: 17 POWDER, FOR SOLUTION ORAL at 09:03

## 2018-11-01 RX ADMIN — METFORMIN HYDROCHLORIDE 500 MG: 500 TABLET, FILM COATED ORAL at 09:02

## 2018-11-01 RX ADMIN — CYCLOBENZAPRINE HYDROCHLORIDE 10 MG: 10 TABLET, FILM COATED ORAL at 09:02

## 2018-11-01 RX ADMIN — Medication 10 ML: at 05:32

## 2018-11-01 RX ADMIN — LORAZEPAM 1 MG: 1 TABLET ORAL at 09:02

## 2018-11-01 RX ADMIN — ASPIRIN 81 MG: 81 TABLET ORAL at 09:02

## 2018-11-01 RX ADMIN — INSULIN LISPRO 2 UNITS: 100 INJECTION, SOLUTION INTRAVENOUS; SUBCUTANEOUS at 12:36

## 2018-11-01 RX ADMIN — INSULIN LISPRO 2 UNITS: 100 INJECTION, SOLUTION INTRAVENOUS; SUBCUTANEOUS at 09:03

## 2018-11-01 RX ADMIN — VITAMIN D, TAB 1000IU (100/BT) 2000 UNITS: 25 TAB at 09:02

## 2018-11-01 NOTE — PROGRESS NOTES
Ortho / Neurosurgery NP Note POD# 1  s/p C3-C5 ANTERIOR CERVICAL DISCECTOMY WITH FUSION Pt seen in bed awaiting therapy Pt resting in bed, no complaints of pain at present. Discussed the tachycardia 10/31 and overnight resulting in her staying an additional day for monitoring. Pt denies CP, SOB, or feeling anxious or feeling that he heart is racing. Cardiology consulted and updated dosing on Coreg and ordered anxioletic to assist with potential anxiety response. Patient reports resolution of preoperative numbness, tingling, and BUE pain, sensation equal in all extremeties and motor is 5/5 intact in all. No edema and cap refill <3. Lungs CTA throughout, no murmur and RRR despite elevated HR. Abdomen is soft round, non tender to palpation with active bowel sounds present. Pt reports that she is having some issues with chewing and swallowing her food as she likes salad however does not have teeth to be able to masticate her food. Discussed food textures/ consistencies, and taking time to \"mash\" foods with her tongue and gums, take smaller more manageable bites, and to use additional sips of water to assist. Also discussed supplementing with Ensure if needed for time being. Patient seems at ease today and understands the additional time to ensure that her cardiologist is ready to release her. Prine[place] over incision, clean dry and intact. Aspen collar in place. No complaints. VSS Afebrile. Voiding status: + voids Labs Lab Results Component Value Date/Time HGB 10.6 (L) 10/16/2018 10:25 AM  
  
Lab Results Component Value Date/Time INR 1.0 10/16/2018 10:25 AM  
  
 
Body mass index is 30.31 kg/m². : A BMI > 30 is classified as obesity and > 40 is classified as morbid obesity. Dressing c.d.i Calves soft and supple; No pain with passive stretch Sensation and motor intact SCDs for mechanical DVT proph while in bed PLAN: 
1) PT BID 
2) Aspirin 81 mg PO BID for DVT Prophylaxis 3) Plavix - home med 4) GI Prophylaxis - Pepcid 5) Readniess for discharge: 
   [x] Vital Signs stable  
 [x] Hgb stable  
 [x] + Voiding  
 [x] Wound intact, drainage minimal  
 [x] Tolerating PO intake [x] Cleared by PT (OT if applicable) [x] Stair training completed (if applicable) [x] Independent / Contact Guard Assist (household distance) [x] Bed mobility [x] Car transfers  
  [x] ADLs [x] Adequate pain control on oral medication alone Discharge home today when cleared by cardiology. Abiodun Flowers 
BSN, RN, ACNP Student

## 2018-11-01 NOTE — PROGRESS NOTES
Problem: Falls - Risk of 
Goal: *Absence of Falls Document Zoya Menard Fall Risk and appropriate interventions in the flowsheet. Outcome: Progressing Towards Goal 
Fall Risk Interventions: 
Mobility Interventions: Patient to call before getting OOB Medication Interventions: Evaluate medications/consider consulting pharmacy History of Falls Interventions: Consult care management for discharge planning

## 2018-11-01 NOTE — PROGRESS NOTES
Bedside and Verbal shift change report given to Nova Segura (oncoming nurse) by Jin Murphy (offgoing nurse). Report included the following information SBAR, Kardex, Procedure Summary, Intake/Output, MAR and Recent Results.

## 2018-11-01 NOTE — PROGRESS NOTES
Reason for Admission:   CERVICAL RADICULOPATHY, SPONDYLOTHESIS WITH MYELOPATHY, CERVICAL STENOSIS; Cervical stenosis of spin* RRAT Score: 21 HIGH Resources/supports as identified by patient/family:  Pt and pt dtr state they have a strong support system available Top Challenges facing patient (as identified by patient/family and CM): Finances/Medication cost?   Pt states no problems affording or accessing medications Transportation? Pt dtr drives pt as needed Support system or lack thereof? Pt and pt dtr state they have a strong support system available Living arrangements? No problems identified Self-care/ADLs/Cognition? Alert and oriented, able to complete ADLS with some assistance. Current Advanced Directive/Advance Care Plan:  None on file, pt and family educated Plan for utilizing home health:  Per recommendation Likelihood of readmission: Moderate per pt acuity Transition of Care Plan:            
Pt is a  70year old,  [place] female, admitted with  CERVICAL RADICULOPATHY, SPONDYLOTHESIS WITH MYELOPATHY, CERVICAL STENOSIS; Cervical stenosis of spin*. Pt was alert and oriented when meeting with CM, confirming address, emergency contact and PCP. Pt states she lives with her , their adult daughter and granddaughter in a one level home, with five steps to enter. Pt states she has a cane, bedside commode and rollader. Rolling walker referral sent through [employer] to [employer]. Pt drives if needed but pt dtr will drive the majority of time. Pt has not had HH or been to a SNF In the past. Pt is requesting [id number] [facility] services post discharge. Referral sent through [address] [place] [address]. FOC signed and placed on chart. Pt's preferred pharmacy is the [facility].  Pt states no problems financially affording or accessing medications. At time of discharge pt dtr will drive her home via private vehicle. Pt and dtr have no questions or concerns regarding discharge. CM will continue to follow pt for discharge planning as needed. Care Management Interventions PCP Verified by CM: Yes Mode of Transport at Discharge: Self Transition of Care Consult (CM Consult): Home Health [facility] - INPATIENT: Yes MyChart Signup: No 
Discharge Durable Medical Equipment: Yes Health Maintenance Reviewed: Yes Physical Therapy Consult: Yes Occupational Therapy Consult: Yes Speech Therapy Consult: No 
Current Support Network: Lives with Spouse, Own Home Confirm Follow Up Transport: Family Plan discussed with Pt/Family/Caregiver: Yes Freedom of Choice Offered: Yes Discharge Location Discharge Placement: Home with home health PAVEL Garcia, CM [facility] [phone]

## 2018-11-01 NOTE — PROGRESS NOTES
Bedside shift change report given to GERSON Gonzalez (oncoming nurse) by Christal Vega RN (offgoing nurse). Report included the following information SBAR, Kardex, Procedure Summary, Intake/Output, MAR, Accordion and Recent Results.

## 2018-11-01 NOTE — PROGRESS NOTES
Pt has been accepted with [facility] via [facility]. Pt informed. Referral sent through [employer] for RW to [place]. Pt approved. CM provided pt with RW and paperwork was signed. AVS updated. No further CM needs identified. Care Management Interventions PCP Verified by CM: Yes Mode of Transport at Discharge: Self Transition of Care Consult (CM Consult): Home Health [address] [place] [address] Road: Yes MyChart Signup: No 
Discharge Durable Medical Equipment: Yes Health Maintenance Reviewed: Yes Physical Therapy Consult: Yes Occupational Therapy Consult: Yes Speech Therapy Consult: No 
Current Support Network: Lives with Spouse, Own Home Confirm Follow Up Transport: Family Plan discussed with Pt/Family/Caregiver: Yes Freedom of Choice Offered: Yes Discharge Location Discharge Placement: Home with home health PAVEL Daniels, CM [facility] [phone]

## 2018-11-01 NOTE — PROGRESS NOTES
Problem: Mobility Impaired (Adult and Pediatric) Goal: *Acute Goals and Plan of Care (Insert Text) Physical Therapy Goals Initiated 10/30/2018 1. Patient will move from supine to sit and sit to supine , scoot up and down and roll side to side in bed with modified independence within 4 days. 2. Patient will perform sit to stand with modified independence within 4 days. 3. Patient will ambulate with modified independence for 250 feet with the least restrictive device within 4 days. 4. Patient will ascend/descend 5 stairs with 1 handrail(s) with modified independence within 4 days. 5. Patient will verbalize and demonstrate understanding of spinal precautions (No bending, lifting greater than 5 lbs, or twisting; log-roll technique; frequent repositioning as instructed) within 4 days. physical Therapy TREATMENT Patient: Neil Kidd (49 y.o. female) Date: 11/1/2018 Diagnosis: CERVICAL RADICULOPATHY, SPONDYLOTHESIS WITH MYELOPATHY, CERVICAL STENOSIS Cervical stenosis of spinal canal 
Cervical stenosis of spine <principal problem not specified> Procedure(s) (LRB): 
C3-C5 ANTERIOR CERVICAL DISCECTOMY WITH FUSION (N/A) 2 Days Post-Op Precautions: Spinal 
Chart, physical therapy assessment, plan of care and goals were reviewed. ASSESSMENT: 
Pt cleared by nurse to mobilize. Pt received up in chair. Pt agreeable to therapy. Pt HR at 100BPM at rest. Pt ambulated 225ft x2 with RW and with no device at CGA/SBA. Carmencita Brakeman Pt with path deviations with RW but able to correct whit decreased in wendie. Pt able to ambulate with no device but with decreased balance and stability with fatigue requiring CGA. Pts HR after ambulation at 124BPM. HR decreasing once sitting. Pt ascended and descended 12 steps with both railings at CGA/SBA. Pt performed a car transfer at [facility] with cueing for sequencing. Pt moving well with no safety concerns. From physical therapy stand point pt cleared for discharge.  Pt will benefit from HHPT to improve endurance and balance for safe mobility. Progression toward goals: 
[x]      Improving appropriately and progressing toward goals 
[]      Improving slowly and progressing toward goals 
[]      Not making progress toward goals and plan of care will be adjusted PLAN: 
Patient continues to benefit from skilled intervention to address the above impairments. Continue treatment per established plan of care. Discharge Recommendations:  Home Health Further Equipment Recommendations for Discharge:  rolling walker SUBJECTIVE:  
Patient stated I feel pretty good .  The patient stated 3/3 cervical precautions. Reviewed all 3 with patient. OBJECTIVE DATA SUMMARY:  
Critical Behavior: 
Neurologic State: Alert, Appropriate for age Orientation Level: Oriented X4 Cognition: Follows commands Functional Mobility Training: 
 
Transfers: 
Sit to Stand: Independent Stand to Sit: Independent Balance: 
Sitting: Intact Standing: Intact; With support Standing - Static: Good Standing - Dynamic : GoodAmbulation/Gait Training: 
Distance (ft): 450 Feet (ft) Assistive Device: Gait belt;Walker, rolling Ambulation - Level of Assistance: Contact guard assistance;Stand-by assistance Gait Abnormalities: Decreased step clearance; Path deviations Base of Support: Widened Speed/Jessica: Pace decreased (<100 feet/min) Step Length: Left shortened;Right shortened Stairs: 
Number of Stairs Trained: 12 
Stairs - Level of Assistance: Contact guard assistance;Stand-by assistance Rail Use: Both 
Pain: 
Pain Scale 1: Numeric (0 - 10) Pain Intensity 1: 4 Pain Location 1: Neck Pain Orientation 1: Anterior Pain Description 1: Aching Pain Intervention(s) 1: Medication (see MAR) Activity Tolerance:  
Pt moving well with no safety concerns. After treatment:  
[x]  Patient left in no apparent distress sitting up in chair []  Patient left in no apparent distress in bed 
[x]  Call bell left within reach [x]  Nursing notified 
[]  Caregiver present 
[]  Bed alarm activated COMMUNICATION/COLLABORATION:  
The patients plan of care was discussed with: Registered Nurse Kalani Lozada Time Calculation: 24 mins

## 2018-11-01 NOTE — PROGRESS NOTES
Progress Note 
 
 
11/1/2018 12:27 PM 
NAME: Jalil Schmid MRN:  [id number] Admit Diagnosis: CERVICAL RADICULOPATHY, SPONDYLOTHESIS WITH MYELOPATHY, CERVICAL STENOSIS Cervical stenosis of spinal canal 
Cervical stenosis of spine Assessment:  
 
Tachycardia S/p cervical spine surgery. CAD S/p PIC of LAD and RCA 3/2018 Diabetes type 2 Obesity Anxiety  
  
 11/1   She feels well . No SOB . No other sxs. HR is still up some , around 100 . No other findings. Plan: I think we can go ahead and discharge her . Increased dose of Coreg. F/u in a few weeks. []        High complexity decision making was performed Subjective:  
 
Jalil Schmid denies chest pain, dyspnea. Discussed with RN events overnight. Patient Active Problem List  
Diagnosis Code  LBP (low back pain) M54.5  DM (diabetes mellitus) ([facility] [address] E11.9  Hypercholesterolemia E78.00  
 Diabetes type 2, uncontrolled ([address] E11.65  Diabetic autonomic neuropathy associated with type 2 diabetes mellitus ([facility] [address] E11.43  
 Hypovitaminosis D E55.9  Leg pain, bilateral M79.604, M79.605  Essential hypertension I10  
 Hypothyroidism E03.9  Cervical stenosis of spinal canal M48.02  
 Cervical stenosis of spine M48.02 Review of Systems: 
 
Symptom Y/N Comments  Symptom Y/N Comments Fever/Chills N   Chest Pain N Poor Appetite N   Edema N   
Cough N   Abdominal Pain N Sputum N   Joint Pain N   
SOB/JENNINGS N   Pruritis/Rash N   
Nausea/vomit N   Tolerating PT/OT Y Diarrhea N   Tolerating Diet Y Constipation N   Other Could NOT obtain due to:   
 
Objective:  
  
Physical Exam: 
 
Last 24hrs VS reviewed since prior progress note. Most recent are: 
 
Visit Vitals /67 Pulse 100 Temp 98.2 °F (36.8 °C) Resp 18 Ht 5' 5.5\" (1.664 m) Wt 83.9 kg (184 lb 15.5 oz) SpO2 100% BMI 30.31 kg/m² Intake/Output Summary (Last 24 hours) at 11/1/2018 1227 Last data filed at 10/31/2018 2046 Gross per 24 hour Intake  Output 450 ml Net -450 ml General Appearance: Well developed, well nourished, alert & oriented x 3,  
 no acute distress. Ears/Nose/Mouth/Throat: Hearing grossly normal. 
Neck: Supple. Chest: Lungs clear to auscultation bilaterally. Cardiovascular: Regular rate and rhythm, S1S2 normal, no murmur. Abdomen: Soft, non-tender, bowel sounds are active. Extremities: No edema bilaterally. Skin: Warm and dry. PMH/SH reviewed - no change compared to H&P Data Review Telemetry: normal sinus rhythm Lab Data Personally Reviewed: 
 
No results for input(s): WBC, HGB, HCT, PLT, HGBEXT, HCTEXT, PLTEXT in the last 72 hours. LABRCNT(INR:3,PTP:3,APTT:3,)No results for input(s): NA, K, CL, CO2, BUN, CREA, GLU, CA, MG in the last 72 hours. LABRCNT(CPK:3,CpKMB:3,ckndx:3,troiq:3) Lab Results Component Value Date/Time Cholesterol, total 173 05/08/2015 09:04 AM  
 HDL Cholesterol 52 05/08/2015 09:04 AM  
 LDL, calculated 111 (H) 05/08/2015 09:04 AM  
 Triglyceride 50 05/08/2015 09:04 AM  
 CHOL/HDL Ratio 3.8 07/31/2012 06:00 PM  
LABRCNT(sgot:3,gpt:3,ap:3,tbiL:3,TP:3,ALB:3,GLOB:3,ggt:3,aml:3,amyp:3,lpse:3,hlpse:3)No results for input(s): PH, PCO2, PO2 in the last 72 hours. Lab Results Component Value Date/Time Cholesterol, total 173 05/08/2015 09:04 AM  
 HDL Cholesterol 52 05/08/2015 09:04 AM  
 LDL, calculated 111 (H) 05/08/2015 09:04 AM  
 Triglyceride 50 05/08/2015 09:04 AM  
 CHOL/HDL Ratio 3.8 07/31/2012 06:00 PM  
MEDTABLETimrandi Siegel MD 
No results for input(s): PH, PCO2, PO2 in the last 72 hours. Medications Personally Reviewed: 
 
Current Facility-Administered Medications Medication Dose Route Frequency  carvedilol (COREG) tablet 6.25 mg  6.25 mg Oral BID WITH MEALS  
 LORazepam (ATIVAN) tablet 1 mg  1 mg Oral BID  aspirin delayed-release tablet 81 mg  81 mg Oral DAILY  metFORMIN (GLUCOPHAGE) tablet 500 mg  500 mg Oral BID WITH MEALS  
 levothyroxine (SYNTHROID) tablet 112 mcg  112 mcg Oral ACB  DULoxetine (CYMBALTA) capsule 60 mg  60 mg Oral DAILY  pravastatin (PRAVACHOL) tablet 40 mg  40 mg Oral QHS  ibuprofen (MOTRIN) 200 mg, diphenhydrAMINE (BENADRYL) 25 mg   Oral QHS PRN  
 clopidogrel (PLAVIX) tablet 75 mg  75 mg Oral DAILY  cholecalciferol (VITAMIN D3) tablet 2,000 Units  2,000 Units Oral DAILY  sodium chloride (NS) flush 5-10 mL  5-10 mL IntraVENous Q8H  
 sodium chloride (NS) flush 5-10 mL  5-10 mL IntraVENous PRN  
 naloxone (NARCAN) injection 0.4 mg  0.4 mg IntraVENous PRN  
 famotidine (PEPCID) tablet 20 mg  20 mg Oral BID  hydrOXYzine HCl (ATARAX) tablet 10 mg  10 mg Oral Q8H PRN  
 senna-docusate (PERICOLACE) 8.6-50 mg per tablet 1 Tab  1 Tab Oral BID  polyethylene glycol (MIRALAX) packet 17 g  17 g Oral DAILY  bisacodyl (DULCOLAX) suppository 10 mg  10 mg Rectal DAILY PRN  phenol throat spray (CHLORASEPTIC) 1 Spray  1 Spray Oral PRN  
 benzocaine-menthol (CEPACOL) lozenge 1 Lozenge  1 Lozenge Oral PRN  
 diazePAM (VALIUM) tablet 5 mg  5 mg Oral Q6H PRN  
 cyclobenzaprine (FLEXERIL) tablet 10 mg  10 mg Oral BID PRN  
 insulin glargine (LANTUS) injection 18 Units  18 Units SubCUTAneous QPM  
 insulin lispro (HUMALOG) injection 2 Units  2 Units SubCUTAneous TIDAC  glimepiride (AMARYL) tablet 4 mg  4 mg Oral ACB  hydrALAZINE (APRESOLINE) 20 mg/mL injection 10 mg  10 mg IntraVENous Q6H PRN  
 HYDROcodone-acetaminophen (NORCO) 5-325 mg per tablet 1-2 Tab  1-2 Tab Oral Q4H PRN Damon Swenson MD

## 2018-11-01 NOTE — PROGRESS NOTES
ORTHO POST OP SPINE PROGRESS NOTE 2018 Admit Date: 10/30/2018 Admit Diagnosis: CERVICAL RADICULOPATHY, SPONDYLOTHESIS WITH MYELOPATHY, CERVICAL STENOSIS Cervical stenosis of spinal canal 
Cervical stenosis of spine Procedure: Procedure(s): 
C3-C5 ANTERIOR CERVICAL DISCECTOMY WITH FUSION Post Op day: 2 Days Post-Op Subjective:  
 
Chuy Gonzalez is a patient who has complaints Of minimal neck pain and no arm pain status post C3 through C5 ACDF. She was kept last night due to tachycardia. She has visited with Cardiology and had medication adjusted ( appreciate consult).  at bedside. Tolerating p.o. and able to void. Review of Systems: Pertinent items are noted in HPI. Objective:  
 
PT/OT:  
Distance Ambulated:          
Time Ambulated (min):       
Ambulation Response: Activity Response: Fairly tolerated Assistive Device:              Assistive Device: Walker (comment) Vital Signs:   
Blood pressure 154/88, pulse (!) 108, temperature 98.9 °F (37.2 °C), resp. rate 18, height 5' 5.5\" (1.664 m), weight 83.9 kg (184 lb 15.5 oz), SpO2 99 %. Temp (24hrs), Av.3 °F (36.8 °C), Min:97.8 °F (36.6 °C), Max:98.9 °F (37.2 °C) No intake/output data recorded. 10/30 1901 -  0700 In: -  
Out: 1700 [RLHHR:8817; Drains:50] LAB:   
No results for input(s): HGB, HGBEXT, WBC, PLT, PLTEXT, HGBEXT, PLTEXT in the last 72 hours. Wound/Drain Assessment: 
Drain:   
 
Dressing:  
 
Physical Exam: 
Neurological: no deficit Incision clean, dry, and intact 5/5 strength bilateral upper extremity Assessment:  
  
Patient Active Problem List  
Diagnosis Code  LBP (low back pain) M54.5  DM (diabetes mellitus) ([facility] [address] E11.9  Hypercholesterolemia E78.00  
 Diabetes type 2, uncontrolled ([facility] [address] E11.65  Diabetic autonomic neuropathy associated with type 2 diabetes mellitus ([facility] [address] E11.43  
 Hypovitaminosis D E55.9  Leg pain, bilateral M79.604, M79.605  Essential hypertension I10  
 Hypothyroidism E03.9  Cervical stenosis of spinal canal M48.02  
 Cervical stenosis of spine M48.02 Plan:  
 
Continue PT/OT/Rehab Discontinue: 
Consult: PT  and OT Discharge To: HOME Heart rate still running high this morning. We will see how she progresses today Signed By: YADI Crystal

## 2018-11-01 NOTE — DISCHARGE SUMMARY
Spine Discharge Summary    Patient ID:  Jalil Schmid  [id number]  female  70 y.o.  1955    Admit date: 10/30/2018    Discharge date: 11/1/2018    Admitting Physician: Gi Hernandes MD     Consulting Physician(s):   Treatment Team: Attending Provider: Sana Rose MD; Utilization Review: Dev Chung RN; Care Manager: Vianey Lee; Physician: Aiden Sunshine MD    Date of Surgery:   10/30/2018     Preoperative Diagnosis:  CERVICAL RADICULOPATHY, SPONDYLOTHESIS WITH MYELOPATHY, CERVICAL STENOSIS    Postoperative Diagnosis:   CDRVICAL RADICULOPATHY, SPONDYLOLITHESISI    Procedure(s):  C3-C5 ANTERIOR CERVICAL DISCECTOMY WITH FUSION     Anesthesia Type:   General     Surgeon: Sana Rose MD                            HPI:  Pt is a 70 y.o. female who has a history of CERVICAL RADICULOPATHY, SPONDYLOTHESIS WITH MYELOPATHY, CERVICAL STENOSIS  with pain and limitations of activities of daily living who presents at this time for a C3-5 ACDF following the failure of conservative management. PMH:   Past Medical History:   Diagnosis Date    Arthritis     CAD (coronary artery disease)     6 stents (CARMITA) 3/21/18 at [facility]    Diabetes ([address]     Dizziness     Dyspepsia and other specified disorders of function of stomach     GERD (gastroesophageal reflux disease)     Headache(784.0)     Hx of cardiac cath 08/2018    after abnormal stress test 3/13/18    Hypertension     Loss of appetite     Musculoskeletal disorder     back pain    Other ill-defined conditions(799.89)     shingles    Thyroid disease     hypothyroidism       Body mass index is 30.31 kg/m². : A BMI > 30 is classified as obesity and > 40 is classified as morbid obesity. Medications upon admission :   Prior to Admission Medications   Prescriptions Last Dose Informant Patient Reported? Taking? Blood-Glucose Meter (RELION PRIME METER) misc 10/29/2018 at Unknown time  Yes Yes   Sig: by Does Not Apply route.    DULoxetine (CYMBALTA) 60 mg capsule 10/30/2018 at 0330  Yes Yes   Sig: Take 60 mg by mouth daily. Ibuprofen-diphenhydrAMINE 200-38 mg tab 10/20/2018  Yes No   Sig: Take 2 Tabs by mouth nightly as needed. aspirin delayed-release 81 mg tablet 10/30/2018 at 0330  Yes Yes   Sig: Take 81 mg by mouth daily. carvedilol (COREG) 3.125 mg tablet 10/30/2018 at 0330  Yes No   Sig: Take  by mouth two (2) times daily (with meals). carvedilol (COREG) 3.125 mg tablet   Yes Yes   Sig: Take 2 Tabs by mouth two (2) times daily (with meals). cholecalciferol, vitamin D3, (VITAMIN D3) 2,000 unit tab 10/23/2018 at Unknown time  Yes Yes   Sig: Take  by mouth daily. clopidogrel (PLAVIX) 75 mg tab 10/23/2018 at Unknown time  Yes Yes   Sig: Take 75 mg by mouth daily. doxycycline (VIBRAMYCIN) 100 mg capsule 9/30/2018 at Unknown time  No Yes   Sig: Take 1 Cap by mouth two (2) times a day. Patient taking differently: Take 100 mg by mouth two (2) times daily as needed. Indications: boils   glimepiride (AMARYL) 4 mg tablet 10/29/2018 at Unknown time  Yes Yes   Sig: Take 4 mg by mouth every morning. glucose blood VI test strips (RELION PRIME TEST STRIPS) strip 10/29/2018 at Unknown time  Yes Yes   Sig: by Does Not Apply route See Admin Instructions. Test blood sugar levels twice a day. DX:E11.9   insulin NPH/insulin regular (NOVOLIN 70/30 U-100 INSULIN) 100 unit/mL (70-30) injection 10/29/2018 at Unknown time  Yes Yes   Sig: by SubCUTAneous route. Indications: takes 17 units in the morning and 15 units at dinner   levothyroxine (SYNTHROID) 112 mcg tablet 10/29/2018 at Unknown time  No Yes   Sig: Take 1 Tab by mouth Daily (before breakfast). lovastatin (MEVACOR) 40 mg tablet 10/29/2018 at Unknown time  No Yes   Sig: Take 1 Tab by mouth nightly. metFORMIN (GLUCOPHAGE) 500 mg tablet 10/29/2018 at Unknown time  No Yes   Sig: Take 1 Tab by mouth two (2) times daily (with meals).    naproxen (NAPROSYN) 500 mg tablet 9/30/2018 at Unknown time  No Yes Sig: Take 1 Tab by mouth two (2) times daily (with meals). Patient taking differently: Take 500 mg by mouth two (2) times daily as needed. raNITIdine (ZANTAC) 300 mg tab 10/23/2018 at Unknown time  Yes Yes   Sig: Take 300 mg by mouth daily. tiZANidine (ZANAFLEX) 4 mg tablet 10/29/2018 at Unknown time  Yes Yes   Sig: Take 4 mg by mouth three (3) times daily as needed. traMADol (ULTRAM) 50 mg tablet 10/29/2018 at Unknown time  No Yes   Sig: TAKE 1 TABLET BY MOUTH EVERY 8 HOURS AS NEEDED FOR PAIN. MAX DAILY AMOUNT: 150 MG (3 TABLETS)      Facility-Administered Medications: None        Allergies: Allergies   Allergen Reactions    Penicillins Rash        Hospital Course: The patient underwent surgery. Complications:  None; patient tolerated the procedure well. Was taken to the PACU in stable condition and then transferred to the ortho floor. Perioperative Antibiotics:  Ancef     Postoperative Pain Management:  Oxycodone      Postoperative transfusions:    Number of units banked PRBCs =   none     Post Op complications: none    Hemoglobin at discharge:    Lab Results   Component Value Date/Time    HGB 10.6 (L) 10/16/2018 10:25 AM    INR 1.0 10/16/2018 10:25 AM       Dressing was changed on POD # 1. Incision - clean, dry and intact. No significant erythema or swelling. Neurovascular exam found to be within normal limits. Wound appears to be healing without any evidence of infection. Pt had a HVAC drain that was removed on POD# 1. Developed tachycardia at rest into 120's post-op . EKG showed sinus tach. Cardiology consulted and Coreg increased with some improvement to 100's. [id number] [address] for d/c home and outpt f/u per Dr. Todd Santos,    Physical Therapy started on the day following surgery and participated in bed mobility, transfers and ambulation.         Gait:  Gait  Base of Support: Widened  Speed/Jessica: Pace decreased (<100 feet/min)  Step Length: Left shortened, Right shortened  Gait Abnormalities: Decreased step clearance, Path deviations  Ambulation - Level of Assistance: Contact guard assistance, Stand-by assistance  Distance (ft): 450 Feet (ft)  Assistive Device: Gait belt, Walker, rolling  Rail Use: Both  Stairs - Level of Assistance: Contact guard assistance, Stand-by assistance  Number of Stairs Trained: 12                   Discharged to: Home and Home with Home Health. Condition on Discharge:   stable    Discharge instructions:    - Take pain medications as prescribed  - Resume pre hospital diet      - Discharge activity: activity as tolerated  - Ambulate as tolerated  - Wound Care Keep wound clean and dry. See discharge instruction sheet. -DISCHARGE MEDICATION LIST     Current Discharge Medication List      START taking these medications    Details   HYDROcodone-acetaminophen (NORCO) 5-325 mg per tablet Take 1-2 Tabs by mouth every four (4) hours as needed. Max Daily Amount: 12 Tabs. If insurance prior auth./quantity restrictions apply, refer to and look up diagnosis code one prescription. Partial fill as needed is permitted. Please do not contact prescriber. Qty: 80 Tab, Refills: 0    Associated Diagnoses: S/P cervical spinal fusion      polyethylene glycol (MIRALAX) 17 gram packet Take 1 Packet by mouth daily as needed (constipation) for up to 15 days. Qty: 15 Packet, Refills: 0      senna-docusate (PERICOLACE) 8.6-50 mg per tablet Take 1 Tab by mouth daily. Qty: 30 Tab, Refills: 0         CONTINUE these medications which have CHANGED    Details   carvedilol (COREG) 3.125 mg tablet Take 2 Tabs by mouth two (2) times daily (with meals). CONTINUE these medications which have NOT CHANGED    Details   cholecalciferol, vitamin D3, (VITAMIN D3) 2,000 unit tab Take  by mouth daily. lovastatin (MEVACOR) 40 mg tablet Take 1 Tab by mouth nightly. Qty: 90 Tab, Refills: 4      raNITIdine (ZANTAC) 300 mg tab Take 300 mg by mouth daily.       clopidogrel (PLAVIX) 75 mg tab Take 75 mg by mouth daily. DULoxetine (CYMBALTA) 60 mg capsule Take 60 mg by mouth daily. levothyroxine (SYNTHROID) 112 mcg tablet Take 1 Tab by mouth Daily (before breakfast). Qty: 90 Tab, Refills: 4      tiZANidine (ZANAFLEX) 4 mg tablet Take 4 mg by mouth three (3) times daily as needed. glucose blood VI test strips (RELION PRIME TEST STRIPS) strip by Does Not Apply route See Admin Instructions. Test blood sugar levels twice a day. DX:E11.9      Blood-Glucose Meter (RELION PRIME METER) misc by Does Not Apply route. metFORMIN (GLUCOPHAGE) 500 mg tablet Take 1 Tab by mouth two (2) times daily (with meals). Qty: 180 Tab, Refills: 4      aspirin delayed-release 81 mg tablet Take 81 mg by mouth daily. glimepiride (AMARYL) 4 mg tablet Take 4 mg by mouth every morning. insulin NPH/insulin regular (NOVOLIN 70/30 U-100 INSULIN) 100 unit/mL (70-30) injection by SubCUTAneous route. Indications: takes 17 units in the morning and 15 units at dinner      Ibuprofen-diphenhydrAMINE 200-38 mg tab Take 2 Tabs by mouth nightly as needed. STOP taking these medications       doxycycline (VIBRAMYCIN) 100 mg capsule Comments:   Reason for Stopping:         traMADol (ULTRAM) 50 mg tablet Comments:   Reason for Stopping:         naproxen (NAPROSYN) 500 mg tablet Comments:   Reason for Stopping:            per medical continuation form      -Follow up in office in 2 weeks with Dr. Yung Stone  3 weeks with Dr. Osvaldo Angulo      Signed:  Sami Smith, GIO-BC, ONP-C  Orthopaedic Nurse Practitioner    11/1/2018  1:30 PM

## 2018-11-02 ENCOUNTER — HOME CARE VISIT (OUTPATIENT)
Dept: HOME HEALTH SERVICES | Facility: HOME HEALTH | Age: 63
End: 2018-11-02

## 2018-11-02 ENCOUNTER — HOME CARE VISIT (OUTPATIENT)
Dept: SCHEDULING | Facility: HOME HEALTH | Age: 63
End: 2018-11-02

## 2018-11-05 ENCOUNTER — HOME CARE VISIT (OUTPATIENT)
Dept: SCHEDULING | Facility: HOME HEALTH | Age: 63
End: 2018-11-05
Payer: MEDICARE

## 2018-11-05 VITALS
OXYGEN SATURATION: 99 % | HEART RATE: 85 BPM | RESPIRATION RATE: 20 BRPM | DIASTOLIC BLOOD PRESSURE: 60 MMHG | SYSTOLIC BLOOD PRESSURE: 120 MMHG | TEMPERATURE: 98.4 F

## 2018-11-05 PROCEDURE — 3331090002 HH PPS REVENUE DEBIT

## 2018-11-05 PROCEDURE — 400013 HH SOC

## 2018-11-05 PROCEDURE — G0151 HHCP-SERV OF PT,EA 15 MIN: HCPCS

## 2018-11-05 PROCEDURE — 3331090001 HH PPS REVENUE CREDIT

## 2018-11-06 ENCOUNTER — HOME CARE VISIT (OUTPATIENT)
Dept: HOME HEALTH SERVICES | Facility: HOME HEALTH | Age: 63
End: 2018-11-06
Payer: MEDICARE

## 2018-11-06 PROCEDURE — 3331090002 HH PPS REVENUE DEBIT

## 2018-11-06 PROCEDURE — 3331090001 HH PPS REVENUE CREDIT

## 2018-11-07 ENCOUNTER — HOME CARE VISIT (OUTPATIENT)
Dept: HOME HEALTH SERVICES | Facility: HOME HEALTH | Age: 63
End: 2018-11-07
Payer: MEDICARE

## 2018-11-07 PROCEDURE — 3331090001 HH PPS REVENUE CREDIT

## 2018-11-07 PROCEDURE — 3331090002 HH PPS REVENUE DEBIT

## 2018-11-08 ENCOUNTER — HOME CARE VISIT (OUTPATIENT)
Dept: SCHEDULING | Facility: HOME HEALTH | Age: 63
End: 2018-11-08
Payer: MEDICARE

## 2018-11-08 VITALS
TEMPERATURE: 98 F | OXYGEN SATURATION: 98 % | DIASTOLIC BLOOD PRESSURE: 66 MMHG | RESPIRATION RATE: 18 BRPM | SYSTOLIC BLOOD PRESSURE: 120 MMHG | HEART RATE: 95 BPM

## 2018-11-08 VITALS
TEMPERATURE: 98 F | OXYGEN SATURATION: 98 % | RESPIRATION RATE: 18 BRPM | SYSTOLIC BLOOD PRESSURE: 120 MMHG | HEART RATE: 95 BPM | DIASTOLIC BLOOD PRESSURE: 66 MMHG

## 2018-11-08 PROCEDURE — 3331090001 HH PPS REVENUE CREDIT

## 2018-11-08 PROCEDURE — G0152 HHCP-SERV OF OT,EA 15 MIN: HCPCS

## 2018-11-08 PROCEDURE — G0151 HHCP-SERV OF PT,EA 15 MIN: HCPCS

## 2018-11-08 PROCEDURE — 3331090002 HH PPS REVENUE DEBIT

## 2018-11-09 PROCEDURE — 3331090002 HH PPS REVENUE DEBIT

## 2018-11-09 PROCEDURE — 3331090001 HH PPS REVENUE CREDIT

## 2018-11-10 PROCEDURE — 3331090002 HH PPS REVENUE DEBIT

## 2018-11-10 PROCEDURE — 3331090001 HH PPS REVENUE CREDIT

## 2018-11-11 PROCEDURE — 3331090001 HH PPS REVENUE CREDIT

## 2018-11-11 PROCEDURE — 3331090002 HH PPS REVENUE DEBIT

## 2018-11-12 PROCEDURE — 3331090002 HH PPS REVENUE DEBIT

## 2018-11-12 PROCEDURE — 3331090001 HH PPS REVENUE CREDIT

## 2018-11-13 ENCOUNTER — HOME CARE VISIT (OUTPATIENT)
Dept: SCHEDULING | Facility: HOME HEALTH | Age: 63
End: 2018-11-13
Payer: MEDICARE

## 2018-11-13 VITALS
RESPIRATION RATE: 18 BRPM | DIASTOLIC BLOOD PRESSURE: 68 MMHG | TEMPERATURE: 97.5 F | SYSTOLIC BLOOD PRESSURE: 118 MMHG | HEART RATE: 84 BPM | OXYGEN SATURATION: 98 %

## 2018-11-13 VITALS
OXYGEN SATURATION: 98 % | TEMPERATURE: 98 F | HEART RATE: 76 BPM | SYSTOLIC BLOOD PRESSURE: 118 MMHG | DIASTOLIC BLOOD PRESSURE: 65 MMHG

## 2018-11-13 PROCEDURE — G0152 HHCP-SERV OF OT,EA 15 MIN: HCPCS

## 2018-11-13 PROCEDURE — 3331090002 HH PPS REVENUE DEBIT

## 2018-11-13 PROCEDURE — G0151 HHCP-SERV OF PT,EA 15 MIN: HCPCS

## 2018-11-13 PROCEDURE — 3331090001 HH PPS REVENUE CREDIT

## 2018-11-14 PROCEDURE — 3331090001 HH PPS REVENUE CREDIT

## 2018-11-14 PROCEDURE — 3331090002 HH PPS REVENUE DEBIT

## 2018-11-15 ENCOUNTER — HOME CARE VISIT (OUTPATIENT)
Dept: SCHEDULING | Facility: HOME HEALTH | Age: 63
End: 2018-11-15
Payer: MEDICARE

## 2018-11-15 VITALS
OXYGEN SATURATION: 99 % | HEART RATE: 84 BPM | TEMPERATURE: 97.7 F | SYSTOLIC BLOOD PRESSURE: 100 MMHG | DIASTOLIC BLOOD PRESSURE: 60 MMHG | RESPIRATION RATE: 16 BRPM

## 2018-11-15 PROCEDURE — G0151 HHCP-SERV OF PT,EA 15 MIN: HCPCS

## 2018-11-15 PROCEDURE — 3331090001 HH PPS REVENUE CREDIT

## 2018-11-15 PROCEDURE — G0152 HHCP-SERV OF OT,EA 15 MIN: HCPCS

## 2018-11-15 PROCEDURE — 3331090002 HH PPS REVENUE DEBIT

## 2018-11-28 ENCOUNTER — TELEPHONE (OUTPATIENT)
Dept: CARDIAC REHAB | Age: 63
End: 2018-11-28

## 2018-11-28 NOTE — TELEPHONE ENCOUNTER
Pt has not attended Cardiac Rehab since 9/11/18. She had been called on a few occasions indicating that she needed back surgery but planned to get a note from surgeon to return to cardiac rehab in the meantime. Pt has not returned. Chart indicates she had back surgery on 10/30/18 and was discharged to home with home health on 11/1/18. Pt dropped from program and chart was broken down.

## 2019-04-01 ENCOUNTER — HOSPITAL ENCOUNTER (OUTPATIENT)
Dept: MRI IMAGING | Age: 64
Discharge: HOME | End: 2019-04-01
Attending: ORTHOPAEDIC SURGERY
Payer: MEDICARE

## 2019-04-01 DIAGNOSIS — M51.26 LUMBAR DISC HERNIATION: ICD-10-CM

## 2019-04-01 DIAGNOSIS — M48.062 SPINAL STENOSIS, LUMBAR REGION, WITH NEUROGENIC CLAUDICATION: ICD-10-CM

## 2019-04-01 DIAGNOSIS — M54.32 LEFT SIDED SCIATICA: ICD-10-CM

## 2019-04-01 DIAGNOSIS — M54.5 LOW BACK PAIN, UNSPECIFIED BACK PAIN LATERALITY, UNSPECIFIED CHRONICITY, WITH SCIATICA PRESENCE UNSPECIFIED: ICD-10-CM

## 2019-04-01 PROCEDURE — 72148 MRI LUMBAR SPINE W/O DYE: CPT

## 2019-05-01 ENCOUNTER — HOSPITAL ENCOUNTER (OUTPATIENT)
Dept: GENERAL RADIOLOGY | Age: 64
Discharge: HOME | End: 2019-05-01
Attending: ORTHOPAEDIC SURGERY
Payer: MEDICARE

## 2019-05-01 ENCOUNTER — HOSPITAL ENCOUNTER (OUTPATIENT)
Dept: PREADMISSION TESTING | Age: 64
Discharge: HOME | End: 2019-05-01
Attending: ORTHOPAEDIC SURGERY
Payer: MEDICARE

## 2019-05-01 VITALS
OXYGEN SATURATION: 99 % | WEIGHT: 177.47 LBS | RESPIRATION RATE: 18 BRPM | TEMPERATURE: 98.1 F | HEART RATE: 67 BPM | SYSTOLIC BLOOD PRESSURE: 138 MMHG | BODY MASS INDEX: 30.3 KG/M2 | DIASTOLIC BLOOD PRESSURE: 72 MMHG | HEIGHT: 64 IN

## 2019-05-01 LAB
25(OH)D3 SERPL-MCNC: 24 NG/ML (ref 30–100)
ALBUMIN SERPL-MCNC: 3.8 G/DL (ref 3.5–5)
ALBUMIN/GLOB SERPL: 1 {RATIO} (ref 1.1–2.2)
ALP SERPL-CCNC: 111 U/L (ref 45–117)
ALT SERPL-CCNC: 14 U/L (ref 12–78)
ANION GAP BLD CALC-SCNC: 18 MMOL/L (ref 10–20)
ANION GAP SERPL CALC-SCNC: 6 MMOL/L (ref 5–15)
APPEARANCE UR: ABNORMAL
AST SERPL-CCNC: 11 U/L (ref 15–37)
BACTERIA URNS QL MICRO: ABNORMAL /HPF
BILIRUB SERPL-MCNC: 0.4 MG/DL (ref 0.2–1)
BILIRUB UR QL: NEGATIVE
BUN BLD-MCNC: 9 MG/DL (ref 9–20)
BUN SERPL-MCNC: 11 MG/DL (ref 6–20)
BUN/CREAT SERPL: 14 (ref 12–20)
CA-I BLD-MCNC: 1.16 MMOL/L (ref 1.12–1.32)
CALCIUM SERPL-MCNC: 9.2 MG/DL (ref 8.5–10.1)
CHLORIDE BLD-SCNC: 99 MMOL/L (ref 98–107)
CHLORIDE SERPL-SCNC: 104 MMOL/L (ref 97–108)
CO2 BLD-SCNC: 29 MMOL/L (ref 21–32)
CO2 SERPL-SCNC: 33 MMOL/L (ref 21–32)
COLOR UR: ABNORMAL
CREAT BLD-MCNC: 0.7 MG/DL (ref 0.6–1.3)
CREAT SERPL-MCNC: 0.8 MG/DL (ref 0.55–1.02)
EPITH CASTS URNS QL MICRO: ABNORMAL /LPF
ERYTHROCYTE [DISTWIDTH] IN BLOOD BY AUTOMATED COUNT: 13.7 % (ref 11.5–14.5)
GLOBULIN SER CALC-MCNC: 4 G/DL (ref 2–4)
GLUCOSE BLD-MCNC: 110 MG/DL (ref 65–100)
GLUCOSE SERPL-MCNC: 123 MG/DL (ref 65–100)
GLUCOSE UR STRIP.AUTO-MCNC: NEGATIVE MG/DL
HCT VFR BLD AUTO: 29.2 % (ref 35–47)
HCT VFR BLD CALC: 31 % (ref 35–47)
HGB BLD-MCNC: 9.5 G/DL (ref 11.5–16)
HGB UR QL STRIP: NEGATIVE
INR PPP: 1.1 (ref 0.9–1.1)
KETONES UR QL STRIP.AUTO: NEGATIVE MG/DL
LEUKOCYTE ESTERASE UR QL STRIP.AUTO: NEGATIVE
MAGNESIUM SERPL-MCNC: 1.9 MG/DL (ref 1.6–2.4)
MCH RBC QN AUTO: 28.8 PG (ref 26–34)
MCHC RBC AUTO-ENTMCNC: 32.5 G/DL (ref 30–36.5)
MCV RBC AUTO: 88.5 FL (ref 80–99)
NITRITE UR QL STRIP.AUTO: NEGATIVE
NRBC # BLD: 0 K/UL (ref 0–0.01)
NRBC BLD-RTO: 0 PER 100 WBC
PH UR STRIP: 6 [PH] (ref 5–8)
PLATELET # BLD AUTO: 403 K/UL (ref 150–400)
PMV BLD AUTO: 9.3 FL (ref 8.9–12.9)
POTASSIUM BLD-SCNC: 2.4 MMOL/L (ref 3.5–5.1)
POTASSIUM SERPL-SCNC: 2.4 MMOL/L (ref 3.5–5.1)
PROT SERPL-MCNC: 7.8 G/DL (ref 6.4–8.2)
PROT UR STRIP-MCNC: NEGATIVE MG/DL
PROTHROMBIN TIME: 11 SEC (ref 9–11.1)
RBC # BLD AUTO: 3.3 M/UL (ref 3.8–5.2)
RBC #/AREA URNS HPF: ABNORMAL /HPF (ref 0–5)
SERVICE CMNT-IMP: ABNORMAL
SODIUM BLD-SCNC: 143 MMOL/L (ref 136–145)
SODIUM SERPL-SCNC: 143 MMOL/L (ref 136–145)
SP GR UR REFRACTOMETRY: 1.01 (ref 1–1.03)
UA: UC IF INDICATED,UAUC: ABNORMAL
UROBILINOGEN UR QL STRIP.AUTO: 1 EU/DL (ref 0.2–1)
WBC # BLD AUTO: 8.9 K/UL (ref 3.6–11)
WBC URNS QL MICRO: ABNORMAL /HPF (ref 0–4)

## 2019-05-01 PROCEDURE — 71046 X-RAY EXAM CHEST 2 VIEWS: CPT

## 2019-05-01 PROCEDURE — 83036 HEMOGLOBIN GLYCOSYLATED A1C: CPT

## 2019-05-01 PROCEDURE — 97161 PT EVAL LOW COMPLEX 20 MIN: CPT

## 2019-05-01 PROCEDURE — 85027 COMPLETE CBC AUTOMATED: CPT

## 2019-05-01 PROCEDURE — 87086 URINE CULTURE/COLONY COUNT: CPT

## 2019-05-01 PROCEDURE — 86923 COMPATIBILITY TEST ELECTRIC: CPT

## 2019-05-01 PROCEDURE — 85610 PROTHROMBIN TIME: CPT

## 2019-05-01 PROCEDURE — 36415 COLL VENOUS BLD VENIPUNCTURE: CPT

## 2019-05-01 PROCEDURE — 80053 COMPREHEN METABOLIC PANEL: CPT

## 2019-05-01 PROCEDURE — 97116 GAIT TRAINING THERAPY: CPT

## 2019-05-01 PROCEDURE — 81001 URINALYSIS AUTO W/SCOPE: CPT

## 2019-05-01 PROCEDURE — 80047 BASIC METABLC PNL IONIZED CA: CPT

## 2019-05-01 PROCEDURE — 86900 BLOOD TYPING SEROLOGIC ABO: CPT

## 2019-05-01 PROCEDURE — 82306 VITAMIN D 25 HYDROXY: CPT

## 2019-05-01 PROCEDURE — 84134 ASSAY OF PREALBUMIN: CPT

## 2019-05-01 PROCEDURE — 83735 ASSAY OF MAGNESIUM: CPT

## 2019-05-01 RX ORDER — VANCOMYCIN/0.9 % SOD CHLORIDE 1.5G/250ML
1500 PLASTIC BAG, INJECTION (ML) INTRAVENOUS ONCE
Status: CANCELLED | OUTPATIENT
Start: 2019-05-06 | End: 2019-05-06

## 2019-05-01 RX ORDER — PREGABALIN 150 MG/1
150 CAPSULE ORAL ONCE
Status: CANCELLED | OUTPATIENT
Start: 2019-05-06 | End: 2019-05-06

## 2019-05-01 RX ORDER — ACETAMINOPHEN 10 MG/ML
1000 INJECTION, SOLUTION INTRAVENOUS ONCE
Status: CANCELLED | OUTPATIENT
Start: 2019-05-06 | End: 2019-05-06

## 2019-05-01 RX ORDER — IBUPROFEN 800 MG/1
800 TABLET ORAL
COMMUNITY
End: 2019-05-11

## 2019-05-01 RX ORDER — LEVOFLOXACIN 5 MG/ML
500 INJECTION, SOLUTION INTRAVENOUS ONCE
Status: CANCELLED | OUTPATIENT
Start: 2019-05-06 | End: 2019-05-06

## 2019-05-01 RX ORDER — DOXYCYCLINE 100 MG/1
100 TABLET ORAL 2 TIMES DAILY
COMMUNITY
End: 2020-05-28

## 2019-05-01 RX ORDER — NAPHAZOLINE HCL/PHENIRAMINE .0268-.315
2 DROPS OPHTHALMIC (EYE)
COMMUNITY
End: 2020-05-28

## 2019-05-01 NOTE — PERIOP NOTES
Incentive Codey Barros Using the incentive spirometer helps expand the small air sacs of your lungs, helps you breathe deeply, and helps improve your lung function. Use your incentive spirometer twice a day (10 breaths each time) prior to surgery. How to Use Your Incentive Spirometer: 1. Hold the incentive spirometer in an upright position. 2. Breathe out as usual.  
3. Place the mouthpiece in your mouth and seal your lips tightly around it. 4. Take a deep breath. Breathe in slowly and as deeply as possible. Keep the blue flow rate guide between the arrows. 5. Hold your breath as long as possible. Then exhale slowly and allow the piston to fall to the bottom of the column. 6. Rest for a few seconds and repeat steps one through five at least 10 times. PAT Tidal Volume__1500______________  x_______2_________  Date__5/1/19_____________________ BRING THE INCENTIVE SPIROMETER WITH YOU TO THE HOSPITAL ON THE DAY OF YOUR SURGERY. Opportunity given to ask and answer questions as well as to observe return demonstration. Patient signature_____________________________    Witness____________________________

## 2019-05-01 NOTE — PERIOP NOTES
[facility] Preoperative Instructions Surgery Date 05/06/19          Time of Arrival 1400 1. On the day of your surgery, please report to the Surgical Services Registration Desk and sign in at your designated time. The Surgery Center is located to the right of the Emergency Room. 2. You must have someone with you to drive you home. You should not drive a car for 24 hours following surgery. Please make arrangements for a friend or family member to stay with you for the first 24 hours after your surgery. 3. Do not have anything to eat or drink (including water, gum, mints, coffee, juice) after midnight ?05/05/19? Suman Nunnery ? This may not apply to medications prescribed by your physician. ?(Please note below the special instructions with medications to take the morning of your procedure.) 4. We recommend you do not drink any alcoholic beverages for 24 hours before and after your surgery. 5. Contact your surgeons office for instructions on the following medications: non-steroidal anti-inflammatory drugs (i.e. Advil, Aleve), vitamins, and supplements. (Some surgeons will want you to stop these medications prior to surgery and others may allow you to take them) **If you are currently taking Plavix, Coumadin, Aspirin and/or other blood-thinning agents, contact your surgeon for instructions. ** Your surgeon will partner with the physician prescribing these medications to determine if it is safe to stop or if you need to continue taking. Please do not stop taking these medications without instructions from your surgeon 6. Wear comfortable clothes. Wear glasses instead of contacts. Do not bring any money or jewelry. Please bring picture ID, insurance card, and any prearranged co-payment or hospital payment. Do not wear make-up, particularly mascara the morning of your surgery. Do not wear nail polish, particularly if you are having foot /hand surgery.   Wear your hair loose or down, no ponytails, buns, kathryn pins or clips. All body piercings must be removed. Please shower with antibacterial soap for three consecutive days before and on the morning of surgery, but do not apply any lotions, powders or deodorants after the shower on the day of surgery. Please use a fresh towels after each shower. Please sleep in clean clothes and change bed linens the night before surgery. Please do not shave for 48 hours prior to surgery. Shaving of the face is acceptable. 7. You should understand that if you do not follow these instructions your surgery may be cancelled. If your physical condition changes (I.e. fever, cold or flu) please contact your surgeon as soon as possible. 8. It is important that you be on time. If a situation occurs where you may be late, please call [phone] (OR Holding Area). 9. If you have any questions and or problems, please call [phone] (Pre-admission Testing). 10. Your surgery time may be subject to change. You will receive a phone call the evening prior if your time changes. 11.  If having outpatient surgery, you must have someone to drive you here, stay with you during the duration of your stay, and to drive you home at time of discharge. 12.   In an effort to improve the efficiency, privacy, and safety for all of our Pre-op patients visitors are not allowed in the Holding area. Once you arrive and are registered your family/visitors will be asked to remain in the waiting room. The Pre-op staff will get you from the Surgical Waiting Area and will explain to you and your family/visitors that the Pre-op phase is beginning. The staff will answer any questions and provide instructions for tracking of the patient, by use of the existing tracking number and color-coded status board in the waiting room.   At this time the staff will also ask for your designated spokesperson information in the event that the physician or staff need to provide an update or obtain any pertinent information. The designated spokesperson will be notified if the physician needs to speak to family during the pre-operative phase. If at any time your family/visitors has questions or concerns they may approach the volunteer desk in the waiting area for assistance. Special Instructions: MEDICATIONS TO TAKE THE MORNING OF SURGERY WITH A SIP OF WATER: carvedilol, synthroid, and ranitidine I understand a pre-operative phone call will be made to verify my surgery time. In the event that I am not available, I give permission for a message to be left on my answering service and/or with another person? Yes [phone] 
 
 
 
 ___________________      __________   _________ 
  (Signature of Patient)             (Witness)                (Date and Time)

## 2019-05-01 NOTE — PROGRESS NOTES
[facility] Physical Therapy Pre-surgery evaluation [address] [facility], [address] [facility] physical Therapy pre SPINE surgery EVALUATION Patient:Mansi Marcelino (64 y.o. female) Date: 5/1/2019 PAT Precautions: Falls ASSESSMENT : 
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 Discussed anticipated disposition to home with possible discharge within a 1 to 2 day time frame post-surgery. Patient and  in agreement. Patient's daughter present for visit and will be available to assist post surgery, patient also has spouse. Patient expects to have a two part surgery and understands this may affect LOS. Patient had ACDF in October and did well post surgery. Anticipate patient will need acute PT and OT orders based on expected pain levels, and  deficits in ambulation, balance, and tolerance of activity  post surgery. GOALS: (Goals have been discussed and agreed upon with patient.) DISCHARGE GOALS: Time Frame: 1 DAY 1. Patient will demonstrate increased strength, range of motion, and pain control via a home exercise program in order to minimize functional deficits in preparation for their upcoming surgery. This will be achieved by using education, demonstration and through the use of an informational handout including a home exercise program. 
REHABILITATION POTENTIAL FOR STATED GOALS: Good RECOMMENDATIONS AND PLANNED INTERVENTIONS: (Benefits and precautions of physical therapy have been discussed with the patient.) 1. Home Exercise Program 
TREATMENT PLAN EFFECTIVE DATES: 5/1/2019 to 5/1/2019 FREQUENCY/DURATION: Patient to continue to perform home exercise program at least twice daily until surgery.   
 
SUBJECTIVE:  
 Patient stated Mac Andrews did really well after my last surgery.  OBJECTIVE DATA SUMMARY:  
HISTORY:   
Past Medical History:  
Diagnosis Date  Arthritis  CAD (coronary artery disease) 6 stents (CARMITA) 3/21/18 at [facility]  Diabetes ([address]  Dizziness  Dyspepsia and other specified disorders of function of stomach  GERD (gastroesophageal reflux disease)  Headache(784.0)  Hx of cardiac cath 08/2018  
 after abnormal stress test 3/13/18  Hypertension  Loss of appetite  Musculoskeletal disorder   
 back pain  Other ill-defined conditions(799.89)   
 shingles  Thyroid disease   
 hypothyroidism Past Surgical History:  
Procedure Laterality Date  CARDIAC SURG PROCEDURE UNLIST    
 6 stents [address]  
 hysterectomy & 2 c-sections  HX HEENT  1963  
 tonsils  HX ORTHOPAEDIC Bilateral   
 carpal tunnel release  HX ORTHOPAEDIC    
 neck surgery  HX OTHER SURGICAL  1976  
 sweat glands removed @ [facility]  HX OTHER SURGICAL  2015  
 abscess removed from right back  DC DRAIN SKIN ABSCESS SIMPLE  1/9/12 Prior Level of Function/Home Situation: Patient lives with her  and daughter in a single story home, ambulates with cane for household distances, no recent falls. Personal factors and/or comorbidities impacting plan of care:  
 
 
Home Situation Home Environment: Private residence # Steps to Enter: 6 Rails to Enter: (P) Yes Hand Rails : (P) Bilateral 
One/Two Story Residence: One story Living Alone: No 
Support Systems: Child(delroy), Spouse/Significant Other/Partner Patient Expects to be Discharged to[de-identified] Private residence Current DME Used/Available at Home: (P) Clerance Spray, straight, Walker, rollator, Walker, rolling Tub or Shower Type: (P) Shower EXAMINATION/PRESENTATION/DECISION MAKING:  
 
ADLs (Current Functional Status): Bathing/Showering:  
[x] Independent 
[] Requires Assistance from Someone 
[] Sponge Bath Only Ambulation: 
[] Independent 
[] Walk Indoors Only 
[] Walk Outdoors [x] Use Assistive Device [] Use Wheelchair Only Dressing: 
[x] Independent Requires Assistance from Someone for: 
[] Sock/Shoes 
[] Pants 
[] Everything Household Activities: 
[] Routine house and yard work [x] Light Housework Only 
[] None Critical Behavior: 
  
  
  
  
 
Strength:   
 Strength: Generally decreased, functional 
  
  
  
  
  
  
  
Tone & Sensation:  
Tone: Normal 
  
  
  
  
Sensation: Intact Range Of Motion: 
 
 AROM: Generally decreased, functional 
  
  
  
PROM: Generally decreased, functional 
  
  
  
  
 
Coordination: 
 Coordination: Within functional limits Functional Mobility: 
Transfers: 
Sit to Stand: Stand-by assistance Stand to Sit: Stand-by assistance Balance:  
Sitting: Intact Standing: Impaired Standing - Static: Constant support, Good Standing - Dynamic : Constant support, Fair Ambulation/Gait Training: 
Distance (ft): 50 Feet (ft) Assistive Device: Cane, straight Ambulation - Level of Assistance: Supervision Gait Abnormalities: Decreased step clearance, Path deviations, Trunk sway increased Base of Support: Widened Speed/Jessica: Slow Step Length: Left shortened, Right shortened Functional Measure: 
10 Meter walk test:  (Specify if any supplemental oxygen is used, the type, pre, during and post sats.) Trial 1 (Time to Walk 10 Meters): 42.6 Seconds Trial 2 (Time to Walk 10 Meters): 54.5 Seconds Trial 3 (Time to Walk 10 Meters): 77.1 Seconds Average : 58.1 Seconds Score (Meters/Second): 0.2 Meters/Second Walking Speed (m/s) Modifier Scale Age 52-63 Age 61-76 Age 66-77 Age 80-80 Male Female Male Female Male Female Male Female CH 
 0% Impaired ?  1.39 ? 1.40 ? 1.36 ? 1.30 ? 1.33 ? 1.27 ? 1.21 ? 1.15  
CI  
1-19% Impaired 1.11-1.38 1.12-1.39 1.09-1.35 1.04-1.29 1.06-1.32 1.01-1.26 0. 96-1.20 0.92-1.14 CJ  
20-39% Impaired 0.83-1.10 0.84-1.11 0.82-1.08 0.78-1.03 0.80-1.05 0.76-1.00 0.72-0.95 0.69-0.91 CK  
40-59% Impaired 0.56-0.82 0.57-0.83 0.54-0.81 0.52-0.77 0.53-0.79 0.51-0.75 0.48-0.71 0.46-0.68 CL  
60-79% Impaired 0.28-0.55 0.28-0.56 0.27-0.53 0.26-0.51 0.27-0.52 0.25-0.50 0.24-0.49 0.23-0.45 CM 
 80-99% Impaired 0.01-0.28 < 0.01-0.28 < 0.01-0.27 < 0.01-0.26 0.01-0.27 0.01-0.24 0.01-0.23 0.01-0.22  
CN  
100% Impaired Cannot Perform Minimal Detectable Change (MDC-90) = 0.1 m/s Enio THOMAS. \"Comfortable and maximum walking speed of adults aged 20-79 years: reference values and determinants. \" Age and Agin Volume 26(1):15-9. Ian Montano. \"Age- and gender-related test performance in community-dwelling elderly people: Six-Minute Walk Test, Berrios Balance Scale, Timed Up & Go Test, and gait speeds. \" Physical Therapy: 2002 Volume 82(2):128-37. Cheryle Balder DM, Mynor DICKSON, Brina DAVISD, [facility] CELIA. \"Assessing stability and change of four performance measures: a longitudinal study evaluating outcome following total hip and knee arthroplasty. \" [facility] Musculoskeletal Disorders: 2005 Volume 6(3). Yamilex Castro, PhD; Lorenzo Jason, PhD. Sarah Yan Paper: \"Walking Speed: the Sixth Vital Sign\" Journal of Geriatric Physical Therapy: 2009 - Volume 32 - Issue 2 - p 25 . Pain: 
Pain Scale 1: Numeric (0 - 10) Pain Intensity 1: 6 Pain Location 1: Back Pain Description 1: Constant; Aching;Tingling;Numb Pain Intervention(s) 1: Medication (see MAR) Radicular pain:  
Patient reports numbness in bilateral LEs when standing, sometimes also has paresthesia in bilateral feet. Activity Tolerance:  
Fair, pain limited. Patient []   does  [x]   does not demonstrate signs/symptoms of shortness of breath/dyspnea on exertion/respiratory distress. COMMUNICATION/EDUCATION:  
The patient was educated on: [x]         Early post operative mobility is imperative to achieve a patient's desired outcomes and to restore biological function. [x]         Post operative spinal precautions may/may not be applicable. These precautions are based on the patient's physician and the procedure(s) performed. [x]         Spinal precautions including: ·   No bending forward, sideways, or backwards ·   No twisting ·   No lifting more than 5-10 pounds ·   No sitting longer than 30-60 minutes at a time ·   Judson brace when out of bed and mobilizing The patients plan of care was discussed as follows:  
[x]         The patient verbalized understanding of his/her plan in preparation for their upcoming surgery 
[x]         The patient's  was present for this session 
[]        The patient reports that he/she does not have a  identified at this time 
[x]         The  verbalized understanding of the education regarding the patient's upcoming surgery [x]         Patient/family agree to work toward stated goals and plan of care. []         Patient understands intent and goals of therapy, but is neutral about his/her participation. []         Patient is unable to participate in goal setting and plan of care. Thank you for this referral. 
Glendon Hammans, PT Time Calculation: 35 mins

## 2019-05-01 NOTE — PERIOP NOTES
Preventing Infections Before and After  Your SurgeryIMPORTANT INSTRUCTIONSPlease read and follow these instructions carefully. If you are unable to comply with the below instructions your procedure will be cancelled. Every Night for Three (3) nights before your surgery: 1. Shower with an antibacterial soap, such as Dial, or the soap provided at your preassessment appointment. A shower is better than a bath for cleaning your skin. 2. If needed, ask someone to help you reach all areas of your body. Dont forget to clean your belly button with every shower. The night before your surgery: If you lose your Hibiclens please contact surgery center or you can purchase it at a local pharmacy 1. On the night before your surgery, shower with an antibacterial soap, such as Dial, or the soap provided at your preassessment appointment. 2. With one packet of Hibiclens in hand, turn water off. 
3. Apply Hibiclens antiseptic skin cleanser with a clean, freshly washed washcloth. ? Gently apply to your body from chin to toes (except the genital area) and especially the area(s) where your incision(s) will be. ? Leave Hibiclens on your skin for at least 20 seconds. CAUTION: If needed, Hibiclens may be used to clean the folds of skin of the legs (such as in the area of the groin) and on your buttocks and hips. However, do not use Hibiclens above the neck or in the genital area (your bottom) or put inside any area of your body. 4. Turn the water back on and rinse. 5. Dry gently with a clean, freshly washed towel. 6. After your shower, do not use any powder, deodorant, perfumes or lotion. 7. Use clean, freshly washed towels and washcloths every time you shower. 8. Wear clean, freshly washed pajamas to bed the night before surgery. 9. Sleep on clean, freshly washed sheets. 10. Do not allow pets to sleep in your bed with you. The Morning of your surgery: 1. Shower again thoroughly with an antibacterial soap, such as Dial or the soap provided at your preassessment appointment. If needed, ask someone for help to reach all areas of your body. Dont forget to clean your belly button! Rinse. 2. Dry gently with a clean, freshly washed towel. 3. After your shower, do not use any powder, deodorant, perfumes or lotion prior to surgery. 4. Put on clean, freshly washed clothing. Tips to help prevent infections after your surgery: 1. Protect your surgical wound from germs: 
? Hand washing is the most important thing you and your caregivers can do to prevent infections. ? Keep your bandage clean and dry! ? Do not touch your surgical wound. 2. Use clean, freshly washed towels and washcloths every time you shower; do not share bath linens with others. 3. Until your surgical wound is healed, wear clothing and sleep on bed linens each day that are clean and freshly washed. 4. Do not allow pets to sleep in your bed with you or touch your surgical wound. 5. Do not smoke  smoking delays wound healing. This may be a good time to stop smoking. 6. If you have diabetes, it is important for you to manage your blood sugar levels properly before your surgery as well as after your surgery. Poorly managed blood sugar levels slow down wound healing and prevent you from healing completely. If you lose your Hibiclens, please call the [facility], or it is available for purchase at your pharmacy. ___________________      ___________________      ________________ 
(Signature of Patient)          (Witness)                   (Date and Time)

## 2019-05-01 NOTE — ADVANCED PRACTICE NURSE
PC to Dr. Jimy Alvarado regarding pt's hypokalemia. Pt reports 5 episodes of diarrhea on Friday and Saturday this weekend but not since. POC chem 8 ordered to verify CMP result of 2.4. Repeat value 2.4. Magnesium level ordered and pending at the time PCP notified. PCP states he will call potassium in to pt's pharmacy of choice, wants to evaluate pt in office on Friday and will repeat K+ at that time. Pt notified and states she will contact PCP today for appt time on Friday. Surgeon's office notified.

## 2019-05-01 NOTE — PERIOP NOTES
Spoke with Alexandru Dowling in pharmacy and okay to give 1 time dose of levaquin since potential risk of interaction with tizanidine.

## 2019-05-01 NOTE — PERIOP NOTES
Called lab again and spoke with Nataliya Villalba since magnesium level is still not in process. She said that she would follow up on it.

## 2019-05-01 NOTE — PERIOP NOTES
Ana Maria Lee NP, called Dr Ivette Reynolds about low potassium level. He is going to call in potassium to her pharmacy to start taking today. She should also include foods in her diet that are higher in potassium. Pt is also to follow up with Dr Ivette Reynolds on Friday to redraw potassium level. Informed pt that if anything changes such as her getting diarrhea again, call MD for advisement. Pt verbalizes understanding.

## 2019-05-01 NOTE — PERIOP NOTES
Told Odessa Valencia NP, about potassium level of 2.4. She is contacted md for advisement. Pt asymptomatic, pt did state that she had diarrhea 3 times on Friday and twice on Saturday. None since then and no other symptoms. Odessa Valencia NP, also reviewed CBC and no new orders at this time.

## 2019-05-02 LAB
BACTERIA SPEC CULT: NORMAL
CC UR VC: NORMAL
EST. AVERAGE GLUCOSE BLD GHB EST-MCNC: 131 MG/DL
HBA1C MFR BLD: 6.2 % (ref 4.2–6.3)
PREALB SERPL-MCNC: 23.2 MG/DL (ref 20–40)
SERVICE CMNT-IMP: NORMAL
SERVICE CMNT-IMP: NORMAL

## 2019-05-02 NOTE — PERIOP NOTES
Called lab to check on status of prealbumin, they said it shouldn't be much longer, was sent to [facility] for analysis.

## 2019-05-06 ENCOUNTER — APPOINTMENT (OUTPATIENT)
Dept: GENERAL RADIOLOGY | Age: 64
DRG: 455 | End: 2019-05-06
Attending: ORTHOPAEDIC SURGERY
Payer: MEDICARE

## 2019-05-06 ENCOUNTER — ANESTHESIA (OUTPATIENT)
Dept: SURGERY | Age: 64
DRG: 455 | End: 2019-05-06
Payer: MEDICARE

## 2019-05-06 ENCOUNTER — HOSPITAL ENCOUNTER (INPATIENT)
Age: 64
LOS: 5 days | Discharge: HOME HEALTH | DRG: 455 | End: 2019-05-11
Attending: ORTHOPAEDIC SURGERY | Admitting: ORTHOPAEDIC SURGERY
Payer: MEDICARE

## 2019-05-06 ENCOUNTER — ANESTHESIA EVENT (OUTPATIENT)
Dept: SURGERY | Age: 64
DRG: 455 | End: 2019-05-06
Payer: MEDICARE

## 2019-05-06 DIAGNOSIS — Z98.1 S/P LUMBAR SPINAL FUSION: Primary | ICD-10-CM

## 2019-05-06 PROBLEM — M48.061 SPINAL STENOSIS OF LUMBAR REGION AT MULTIPLE LEVELS: Status: ACTIVE | Noted: 2019-05-06

## 2019-05-06 LAB
ANION GAP BLD CALC-SCNC: 19 MMOL/L (ref 10–20)
BUN BLD-MCNC: 6 MG/DL (ref 9–20)
CA-I BLD-MCNC: 1.15 MMOL/L (ref 1.12–1.32)
CHLORIDE BLD-SCNC: 103 MMOL/L (ref 98–107)
CO2 BLD-SCNC: 27 MMOL/L (ref 21–32)
CREAT BLD-MCNC: 0.7 MG/DL (ref 0.6–1.3)
GLUCOSE BLD STRIP.AUTO-MCNC: 206 MG/DL (ref 65–100)
GLUCOSE BLD-MCNC: 125 MG/DL (ref 65–100)
HCT VFR BLD CALC: 29 % (ref 35–47)
POTASSIUM BLD-SCNC: 2.8 MMOL/L (ref 3.5–5.1)
SERVICE CMNT-IMP: ABNORMAL
SERVICE CMNT-IMP: ABNORMAL
SODIUM BLD-SCNC: 145 MMOL/L (ref 136–145)

## 2019-05-06 PROCEDURE — 74011250636 HC RX REV CODE- 250/636: Performed by: ANESTHESIOLOGY

## 2019-05-06 PROCEDURE — 77030026438 HC STYL ET INTUB CARD -A: Performed by: ANESTHESIOLOGY

## 2019-05-06 PROCEDURE — 82962 GLUCOSE BLOOD TEST: CPT

## 2019-05-06 PROCEDURE — 77030014647 HC SEAL FBRN TISSL BAXT -D: Performed by: ORTHOPAEDIC SURGERY

## 2019-05-06 PROCEDURE — 77030033138 HC SUT PGA STRATFX J&J -B: Performed by: ORTHOPAEDIC SURGERY

## 2019-05-06 PROCEDURE — 77030025906 HC GRFT BN CUBE CNFRM MUSC -F: Performed by: ORTHOPAEDIC SURGERY

## 2019-05-06 PROCEDURE — 77030029099 HC BN WAX SSPC -A: Performed by: ORTHOPAEDIC SURGERY

## 2019-05-06 PROCEDURE — 77030020268 HC MISC GENERAL SUPPLY: Performed by: ORTHOPAEDIC SURGERY

## 2019-05-06 PROCEDURE — 77030019908 HC STETH ESOPH SIMS -A: Performed by: ANESTHESIOLOGY

## 2019-05-06 PROCEDURE — 74011250636 HC RX REV CODE- 250/636

## 2019-05-06 PROCEDURE — 77030034850: Performed by: ORTHOPAEDIC SURGERY

## 2019-05-06 PROCEDURE — 74011250636 HC RX REV CODE- 250/636: Performed by: ORTHOPAEDIC SURGERY

## 2019-05-06 PROCEDURE — 77030003028 HC SUT VCRL J&J -A: Performed by: ORTHOPAEDIC SURGERY

## 2019-05-06 PROCEDURE — 77030018836 HC SOL IRR NACL ICUM -A: Performed by: ORTHOPAEDIC SURGERY

## 2019-05-06 PROCEDURE — 74011000250 HC RX REV CODE- 250: Performed by: ORTHOPAEDIC SURGERY

## 2019-05-06 PROCEDURE — 77030021678 HC GLIDESCP STAT DISP VERT -B: Performed by: ANESTHESIOLOGY

## 2019-05-06 PROCEDURE — 77030040356 HC CORD BPLR FRCP COVD -A: Performed by: ORTHOPAEDIC SURGERY

## 2019-05-06 PROCEDURE — 77030018723 HC ELCTRD BLD COVD -A: Performed by: ORTHOPAEDIC SURGERY

## 2019-05-06 PROCEDURE — C1713 ANCHOR/SCREW BN/BN,TIS/BN: HCPCS | Performed by: ORTHOPAEDIC SURGERY

## 2019-05-06 PROCEDURE — 77030008684 HC TU ET CUF COVD -B: Performed by: ANESTHESIOLOGY

## 2019-05-06 PROCEDURE — 76060000037 HC ANESTHESIA 3 TO 3.5 HR: Performed by: ORTHOPAEDIC SURGERY

## 2019-05-06 PROCEDURE — 65270000029 HC RM PRIVATE

## 2019-05-06 PROCEDURE — 77030018846 HC SOL IRR STRL H20 ICUM -A: Performed by: ORTHOPAEDIC SURGERY

## 2019-05-06 PROCEDURE — 74011250637 HC RX REV CODE- 250/637: Performed by: ORTHOPAEDIC SURGERY

## 2019-05-06 PROCEDURE — 77030031139 HC SUT VCRL2 J&J -A: Performed by: ORTHOPAEDIC SURGERY

## 2019-05-06 PROCEDURE — 74011636637 HC RX REV CODE- 636/637: Performed by: ORTHOPAEDIC SURGERY

## 2019-05-06 PROCEDURE — 76210000000 HC OR PH I REC 2 TO 2.5 HR: Performed by: ORTHOPAEDIC SURGERY

## 2019-05-06 PROCEDURE — 07DR3ZZ EXTRACTION OF ILIAC BONE MARROW, PERCUTANEOUS APPROACH: ICD-10-PCS | Performed by: ORTHOPAEDIC SURGERY

## 2019-05-06 PROCEDURE — 77030038844 HC GRFT DMB NEVOS BIOE -G1: Performed by: ORTHOPAEDIC SURGERY

## 2019-05-06 PROCEDURE — 77030020782 HC GWN BAIR PAWS FLX 3M -B

## 2019-05-06 PROCEDURE — 77030013079 HC BLNKT BAIR HGGR 3M -A: Performed by: ANESTHESIOLOGY

## 2019-05-06 PROCEDURE — 76010000173 HC OR TIME 3 TO 3.5 HR INTENSV-TIER 1: Performed by: ORTHOPAEDIC SURGERY

## 2019-05-06 PROCEDURE — 74011000250 HC RX REV CODE- 250

## 2019-05-06 PROCEDURE — 77030029251 HC SPCR SPN GLMB -K1: Performed by: ORTHOPAEDIC SURGERY

## 2019-05-06 PROCEDURE — 77030014007 HC SPNG HEMSTAT J&J -B: Performed by: ORTHOPAEDIC SURGERY

## 2019-05-06 PROCEDURE — 0SG10A0 FUSION OF 2 OR MORE LUMBAR VERTEBRAL JOINTS WITH INTERBODY FUSION DEVICE, ANTERIOR APPROACH, ANTERIOR COLUMN, OPEN APPROACH: ICD-10-PCS | Performed by: ORTHOPAEDIC SURGERY

## 2019-05-06 PROCEDURE — 77030039267 HC ADH SKN EXOFIN S2SG -B: Performed by: ORTHOPAEDIC SURGERY

## 2019-05-06 PROCEDURE — 77030003029 HC SUT VCRL J&J -B: Performed by: ORTHOPAEDIC SURGERY

## 2019-05-06 PROCEDURE — 77030035129: Performed by: ORTHOPAEDIC SURGERY

## 2019-05-06 PROCEDURE — 77030032490 HC SLV COMPR SCD KNE COVD -B: Performed by: ORTHOPAEDIC SURGERY

## 2019-05-06 PROCEDURE — 72100 X-RAY EXAM L-S SPINE 2/3 VWS: CPT

## 2019-05-06 PROCEDURE — 77030037696 HC ALLGRFT BN VIA FORM VIVX -I1: Performed by: ORTHOPAEDIC SURGERY

## 2019-05-06 PROCEDURE — 80047 BASIC METABLC PNL IONIZED CA: CPT

## 2019-05-06 PROCEDURE — 76000 FLUOROSCOPY <1 HR PHYS/QHP: CPT

## 2019-05-06 PROCEDURE — 77030002986 HC SUT PROL J&J -A: Performed by: ORTHOPAEDIC SURGERY

## 2019-05-06 DEVICE — ALLOGRAFT BNE SPNG 50X20X7 MM CANC DBM: Type: IMPLANTABLE DEVICE | Site: SPINE LUMBAR | Status: FUNCTIONAL

## 2019-05-06 DEVICE — RISE-L SPACER 22 X 50MM, 7-14MM, 3-15&DEG;
Type: IMPLANTABLE DEVICE | Site: SPINE LUMBAR | Status: FUNCTIONAL
Brand: RISE-L

## 2019-05-06 DEVICE — GRAFT BNE SUB W17XH17XL17MM 49ML CANC CUBE DEMIN CNFRM: Type: IMPLANTABLE DEVICE | Site: SPINE LUMBAR | Status: FUNCTIONAL

## 2019-05-06 DEVICE — 5.5MM VARIABLE ANGLE SCREW, 26MM
Type: IMPLANTABLE DEVICE | Site: SPINE LUMBAR | Status: FUNCTIONAL
Brand: TRUSS

## 2019-05-06 DEVICE — PLYMOUTH THORACOLUMBAR PLATE, 17MM
Type: IMPLANTABLE DEVICE | Site: SPINE LUMBAR | Status: FUNCTIONAL
Brand: PLYMOUTH

## 2019-05-06 DEVICE — RISE-L SPACER 22 X 55MM, 7-14MM, 3-15&DEG;
Type: IMPLANTABLE DEVICE | Site: SPINE LUMBAR | Status: FUNCTIONAL
Brand: RISE-L

## 2019-05-06 DEVICE — GRAFT BONE SUB 10CC DEMIN SCAFFOLD FRM MOLD VIA: Type: IMPLANTABLE DEVICE | Site: SPINE LUMBAR | Status: FUNCTIONAL

## 2019-05-06 RX ORDER — FENTANYL CITRATE 50 UG/ML
50 INJECTION, SOLUTION INTRAMUSCULAR; INTRAVENOUS AS NEEDED
Status: DISCONTINUED | OUTPATIENT
Start: 2019-05-06 | End: 2019-05-06 | Stop reason: HOSPADM

## 2019-05-06 RX ORDER — DIAZEPAM 5 MG/1
5 TABLET ORAL
Status: DISCONTINUED | OUTPATIENT
Start: 2019-05-06 | End: 2019-05-11 | Stop reason: HOSPADM

## 2019-05-06 RX ORDER — MAGNESIUM SULFATE 100 %
4 CRYSTALS MISCELLANEOUS AS NEEDED
Status: DISCONTINUED | OUTPATIENT
Start: 2019-05-06 | End: 2019-05-11 | Stop reason: HOSPADM

## 2019-05-06 RX ORDER — SODIUM CHLORIDE, SODIUM LACTATE, POTASSIUM CHLORIDE, CALCIUM CHLORIDE 600; 310; 30; 20 MG/100ML; MG/100ML; MG/100ML; MG/100ML
25 INJECTION, SOLUTION INTRAVENOUS CONTINUOUS
Status: DISCONTINUED | OUTPATIENT
Start: 2019-05-06 | End: 2019-05-06 | Stop reason: HOSPADM

## 2019-05-06 RX ORDER — GABAPENTIN 100 MG/1
100 CAPSULE ORAL 3 TIMES DAILY
Status: DISCONTINUED | OUTPATIENT
Start: 2019-05-07 | End: 2019-05-07 | Stop reason: SDUPTHER

## 2019-05-06 RX ORDER — OXYCODONE HYDROCHLORIDE 5 MG/1
10-15 TABLET ORAL
Status: DISCONTINUED | OUTPATIENT
Start: 2019-05-06 | End: 2019-05-07 | Stop reason: SDUPTHER

## 2019-05-06 RX ORDER — CLOPIDOGREL BISULFATE 75 MG/1
75 TABLET ORAL DAILY
Status: DISCONTINUED | OUTPATIENT
Start: 2019-05-07 | End: 2019-05-11 | Stop reason: HOSPADM

## 2019-05-06 RX ORDER — CEFAZOLIN SODIUM/WATER 2 G/20 ML
2 SYRINGE (ML) INTRAVENOUS EVERY 8 HOURS
Status: COMPLETED | OUTPATIENT
Start: 2019-05-07 | End: 2019-05-07

## 2019-05-06 RX ORDER — AMOXICILLIN 250 MG
1 CAPSULE ORAL 2 TIMES DAILY
Status: DISCONTINUED | OUTPATIENT
Start: 2019-05-07 | End: 2019-05-07 | Stop reason: SDUPTHER

## 2019-05-06 RX ORDER — FENTANYL CITRATE 50 UG/ML
INJECTION, SOLUTION INTRAMUSCULAR; INTRAVENOUS AS NEEDED
Status: DISCONTINUED | OUTPATIENT
Start: 2019-05-06 | End: 2019-05-06 | Stop reason: HOSPADM

## 2019-05-06 RX ORDER — POTASSIUM CHLORIDE 7.45 MG/ML
10 INJECTION INTRAVENOUS
Status: COMPLETED | OUTPATIENT
Start: 2019-05-06 | End: 2019-05-06

## 2019-05-06 RX ORDER — OXYCODONE HYDROCHLORIDE 5 MG/1
5 TABLET ORAL
Status: DISCONTINUED | OUTPATIENT
Start: 2019-05-06 | End: 2019-05-07 | Stop reason: SDUPTHER

## 2019-05-06 RX ORDER — PHENYLEPHRINE HCL IN 0.9% NACL 0.4MG/10ML
SYRINGE (ML) INTRAVENOUS AS NEEDED
Status: DISCONTINUED | OUTPATIENT
Start: 2019-05-06 | End: 2019-05-06 | Stop reason: HOSPADM

## 2019-05-06 RX ORDER — MORPHINE SULFATE 10 MG/ML
2 INJECTION, SOLUTION INTRAMUSCULAR; INTRAVENOUS
Status: COMPLETED | OUTPATIENT
Start: 2019-05-06 | End: 2019-05-06

## 2019-05-06 RX ORDER — ASPIRIN 81 MG/1
81 TABLET ORAL DAILY
Status: DISCONTINUED | OUTPATIENT
Start: 2019-05-07 | End: 2019-05-11 | Stop reason: HOSPADM

## 2019-05-06 RX ORDER — ONDANSETRON 2 MG/ML
4 INJECTION INTRAMUSCULAR; INTRAVENOUS AS NEEDED
Status: DISCONTINUED | OUTPATIENT
Start: 2019-05-06 | End: 2019-05-06 | Stop reason: HOSPADM

## 2019-05-06 RX ORDER — HYDROXYZINE HYDROCHLORIDE 10 MG/1
10 TABLET, FILM COATED ORAL
Status: DISCONTINUED | OUTPATIENT
Start: 2019-05-06 | End: 2019-05-07 | Stop reason: SDUPTHER

## 2019-05-06 RX ORDER — ONDANSETRON 2 MG/ML
INJECTION INTRAMUSCULAR; INTRAVENOUS AS NEEDED
Status: DISCONTINUED | OUTPATIENT
Start: 2019-05-06 | End: 2019-05-06 | Stop reason: HOSPADM

## 2019-05-06 RX ORDER — METFORMIN HYDROCHLORIDE 500 MG/1
500 TABLET ORAL 2 TIMES DAILY WITH MEALS
Status: DISCONTINUED | OUTPATIENT
Start: 2019-05-07 | End: 2019-05-11 | Stop reason: HOSPADM

## 2019-05-06 RX ORDER — HYDROMORPHONE HYDROCHLORIDE 1 MG/ML
1 INJECTION, SOLUTION INTRAMUSCULAR; INTRAVENOUS; SUBCUTANEOUS
Status: DISCONTINUED | OUTPATIENT
Start: 2019-05-06 | End: 2019-05-07 | Stop reason: SDUPTHER

## 2019-05-06 RX ORDER — INSULIN LISPRO 100 [IU]/ML
INJECTION, SOLUTION INTRAVENOUS; SUBCUTANEOUS
Status: DISCONTINUED | OUTPATIENT
Start: 2019-05-06 | End: 2019-05-11 | Stop reason: HOSPADM

## 2019-05-06 RX ORDER — CYCLOBENZAPRINE HCL 10 MG
10 TABLET ORAL
Status: DISCONTINUED | OUTPATIENT
Start: 2019-05-06 | End: 2019-05-07 | Stop reason: SDUPTHER

## 2019-05-06 RX ORDER — DEXTROSE 50 % IN WATER (D50W) INTRAVENOUS SYRINGE
25-50 AS NEEDED
Status: DISCONTINUED | OUTPATIENT
Start: 2019-05-06 | End: 2019-05-11 | Stop reason: HOSPADM

## 2019-05-06 RX ORDER — CARVEDILOL 12.5 MG/1
12.5 TABLET ORAL 2 TIMES DAILY WITH MEALS
Status: DISCONTINUED | OUTPATIENT
Start: 2019-05-07 | End: 2019-05-11 | Stop reason: HOSPADM

## 2019-05-06 RX ORDER — SODIUM CHLORIDE 0.9 % (FLUSH) 0.9 %
5-40 SYRINGE (ML) INJECTION EVERY 8 HOURS
Status: DISCONTINUED | OUTPATIENT
Start: 2019-05-07 | End: 2019-05-07 | Stop reason: SDUPTHER

## 2019-05-06 RX ORDER — LEVOTHYROXINE SODIUM 112 UG/1
112 TABLET ORAL
Status: DISCONTINUED | OUTPATIENT
Start: 2019-05-07 | End: 2019-05-08

## 2019-05-06 RX ORDER — NEOSTIGMINE METHYLSULFATE 1 MG/ML
INJECTION INTRAVENOUS AS NEEDED
Status: DISCONTINUED | OUTPATIENT
Start: 2019-05-06 | End: 2019-05-06 | Stop reason: HOSPADM

## 2019-05-06 RX ORDER — SUCCINYLCHOLINE CHLORIDE 20 MG/ML
INJECTION INTRAMUSCULAR; INTRAVENOUS AS NEEDED
Status: DISCONTINUED | OUTPATIENT
Start: 2019-05-06 | End: 2019-05-06 | Stop reason: HOSPADM

## 2019-05-06 RX ORDER — SODIUM CHLORIDE 9 MG/ML
125 INJECTION, SOLUTION INTRAVENOUS CONTINUOUS
Status: DISCONTINUED | OUTPATIENT
Start: 2019-05-06 | End: 2019-05-07 | Stop reason: SDUPTHER

## 2019-05-06 RX ORDER — MIDAZOLAM HYDROCHLORIDE 1 MG/ML
1 INJECTION, SOLUTION INTRAMUSCULAR; INTRAVENOUS AS NEEDED
Status: DISCONTINUED | OUTPATIENT
Start: 2019-05-06 | End: 2019-05-06 | Stop reason: HOSPADM

## 2019-05-06 RX ORDER — NALOXONE HYDROCHLORIDE 0.4 MG/ML
0.4 INJECTION, SOLUTION INTRAMUSCULAR; INTRAVENOUS; SUBCUTANEOUS AS NEEDED
Status: DISCONTINUED | OUTPATIENT
Start: 2019-05-06 | End: 2019-05-07 | Stop reason: SDUPTHER

## 2019-05-06 RX ORDER — POTASSIUM CHLORIDE 750 MG/1
10 TABLET, FILM COATED, EXTENDED RELEASE ORAL 3 TIMES DAILY
Status: DISCONTINUED | OUTPATIENT
Start: 2019-05-07 | End: 2019-05-11 | Stop reason: HOSPADM

## 2019-05-06 RX ORDER — ROCURONIUM BROMIDE 10 MG/ML
INJECTION, SOLUTION INTRAVENOUS AS NEEDED
Status: DISCONTINUED | OUTPATIENT
Start: 2019-05-06 | End: 2019-05-06 | Stop reason: HOSPADM

## 2019-05-06 RX ORDER — GLIMEPIRIDE 1 MG/1
4 TABLET ORAL
Status: DISCONTINUED | OUTPATIENT
Start: 2019-05-07 | End: 2019-05-08

## 2019-05-06 RX ORDER — PROPOFOL 10 MG/ML
INJECTION, EMULSION INTRAVENOUS AS NEEDED
Status: DISCONTINUED | OUTPATIENT
Start: 2019-05-06 | End: 2019-05-06 | Stop reason: HOSPADM

## 2019-05-06 RX ORDER — PREGABALIN 75 MG/1
150 CAPSULE ORAL ONCE
Status: COMPLETED | OUTPATIENT
Start: 2019-05-06 | End: 2019-05-06

## 2019-05-06 RX ORDER — LORAZEPAM 2 MG/ML
1 INJECTION INTRAMUSCULAR
Status: DISCONTINUED | OUTPATIENT
Start: 2019-05-06 | End: 2019-05-11 | Stop reason: HOSPADM

## 2019-05-06 RX ORDER — LEVOFLOXACIN 5 MG/ML
500 INJECTION, SOLUTION INTRAVENOUS ONCE
Status: COMPLETED | OUTPATIENT
Start: 2019-05-06 | End: 2019-05-06

## 2019-05-06 RX ORDER — DULOXETIN HYDROCHLORIDE 30 MG/1
60 CAPSULE, DELAYED RELEASE ORAL DAILY
Status: DISCONTINUED | OUTPATIENT
Start: 2019-05-07 | End: 2019-05-11 | Stop reason: HOSPADM

## 2019-05-06 RX ORDER — SODIUM CHLORIDE 0.9 % (FLUSH) 0.9 %
5-40 SYRINGE (ML) INJECTION AS NEEDED
Status: DISCONTINUED | OUTPATIENT
Start: 2019-05-06 | End: 2019-05-07 | Stop reason: SDUPTHER

## 2019-05-06 RX ORDER — VANCOMYCIN/0.9 % SOD CHLORIDE 1.5G/250ML
1500 PLASTIC BAG, INJECTION (ML) INTRAVENOUS ONCE
Status: COMPLETED | OUTPATIENT
Start: 2019-05-06 | End: 2019-05-06

## 2019-05-06 RX ORDER — ACETAMINOPHEN 10 MG/ML
INJECTION, SOLUTION INTRAVENOUS
Status: COMPLETED
Start: 2019-05-06 | End: 2019-05-06

## 2019-05-06 RX ORDER — FENTANYL CITRATE 50 UG/ML
25 INJECTION, SOLUTION INTRAMUSCULAR; INTRAVENOUS
Status: DISCONTINUED | OUTPATIENT
Start: 2019-05-06 | End: 2019-05-06 | Stop reason: HOSPADM

## 2019-05-06 RX ORDER — ROSUVASTATIN CALCIUM 10 MG/1
20 TABLET, COATED ORAL
Status: DISCONTINUED | OUTPATIENT
Start: 2019-05-07 | End: 2019-05-11 | Stop reason: HOSPADM

## 2019-05-06 RX ORDER — GLYCOPYRROLATE 0.2 MG/ML
INJECTION INTRAMUSCULAR; INTRAVENOUS AS NEEDED
Status: DISCONTINUED | OUTPATIENT
Start: 2019-05-06 | End: 2019-05-06 | Stop reason: HOSPADM

## 2019-05-06 RX ORDER — FACIAL-BODY WIPES
10 EACH TOPICAL DAILY PRN
Status: DISCONTINUED | OUTPATIENT
Start: 2019-05-08 | End: 2019-05-07 | Stop reason: SDUPTHER

## 2019-05-06 RX ORDER — ACETAMINOPHEN 10 MG/ML
1000 INJECTION, SOLUTION INTRAVENOUS ONCE
Status: COMPLETED | OUTPATIENT
Start: 2019-05-06 | End: 2019-05-06

## 2019-05-06 RX ORDER — POLYETHYLENE GLYCOL 3350 17 G/17G
17 POWDER, FOR SOLUTION ORAL DAILY
Status: DISCONTINUED | OUTPATIENT
Start: 2019-05-07 | End: 2019-05-07 | Stop reason: SDUPTHER

## 2019-05-06 RX ORDER — HYDROMORPHONE HYDROCHLORIDE 2 MG/ML
INJECTION, SOLUTION INTRAMUSCULAR; INTRAVENOUS; SUBCUTANEOUS AS NEEDED
Status: DISCONTINUED | OUTPATIENT
Start: 2019-05-06 | End: 2019-05-06 | Stop reason: HOSPADM

## 2019-05-06 RX ORDER — FAMOTIDINE 20 MG/1
20 TABLET, FILM COATED ORAL 2 TIMES DAILY
Status: DISCONTINUED | OUTPATIENT
Start: 2019-05-07 | End: 2019-05-07 | Stop reason: SDUPTHER

## 2019-05-06 RX ORDER — ACETAMINOPHEN 500 MG
1000 TABLET ORAL EVERY 6 HOURS
Status: DISCONTINUED | OUTPATIENT
Start: 2019-05-07 | End: 2019-05-07 | Stop reason: SDUPTHER

## 2019-05-06 RX ORDER — RANITIDINE 15 MG/ML
300 SYRUP ORAL DAILY
Status: DISCONTINUED | OUTPATIENT
Start: 2019-05-07 | End: 2019-05-11 | Stop reason: HOSPADM

## 2019-05-06 RX ORDER — ONDANSETRON 2 MG/ML
4 INJECTION INTRAMUSCULAR; INTRAVENOUS
Status: DISCONTINUED | OUTPATIENT
Start: 2019-05-06 | End: 2019-05-07 | Stop reason: SDUPTHER

## 2019-05-06 RX ORDER — LIDOCAINE HYDROCHLORIDE 10 MG/ML
0.1 INJECTION, SOLUTION EPIDURAL; INFILTRATION; INTRACAUDAL; PERINEURAL AS NEEDED
Status: DISCONTINUED | OUTPATIENT
Start: 2019-05-06 | End: 2019-05-06 | Stop reason: HOSPADM

## 2019-05-06 RX ORDER — ACETAMINOPHEN 325 MG/1
650 TABLET ORAL ONCE
Status: DISCONTINUED | OUTPATIENT
Start: 2019-05-06 | End: 2019-05-06 | Stop reason: HOSPADM

## 2019-05-06 RX ADMIN — INSULIN LISPRO 2 UNITS: 100 INJECTION, SOLUTION INTRAVENOUS; SUBCUTANEOUS at 21:28

## 2019-05-06 RX ADMIN — Medication 120 MCG: at 18:01

## 2019-05-06 RX ADMIN — PREGABALIN 150 MG: 75 CAPSULE ORAL at 15:31

## 2019-05-06 RX ADMIN — PROCHLORPERAZINE EDISYLATE 5 MG: 5 INJECTION INTRAMUSCULAR; INTRAVENOUS at 20:58

## 2019-05-06 RX ADMIN — VANCOMYCIN HYDROCHLORIDE 1500 MG: 10 INJECTION, POWDER, LYOPHILIZED, FOR SOLUTION INTRAVENOUS at 15:28

## 2019-05-06 RX ADMIN — SODIUM CHLORIDE, SODIUM LACTATE, POTASSIUM CHLORIDE, AND CALCIUM CHLORIDE 25 ML/HR: 600; 310; 30; 20 INJECTION, SOLUTION INTRAVENOUS at 15:28

## 2019-05-06 RX ADMIN — NEOSTIGMINE METHYLSULFATE 5 MG: 1 INJECTION INTRAVENOUS at 20:03

## 2019-05-06 RX ADMIN — POTASSIUM CHLORIDE 10 MEQ: 10 INJECTION, SOLUTION INTRAVENOUS at 17:07

## 2019-05-06 RX ADMIN — HYDROMORPHONE HYDROCHLORIDE 0.5 MG: 2 INJECTION, SOLUTION INTRAMUSCULAR; INTRAVENOUS; SUBCUTANEOUS at 19:13

## 2019-05-06 RX ADMIN — ONDANSETRON 4 MG: 2 INJECTION INTRAMUSCULAR; INTRAVENOUS at 20:03

## 2019-05-06 RX ADMIN — FENTANYL CITRATE 25 MCG: 50 INJECTION, SOLUTION INTRAMUSCULAR; INTRAVENOUS at 21:32

## 2019-05-06 RX ADMIN — SODIUM CHLORIDE 125 ML/HR: 900 INJECTION, SOLUTION INTRAVENOUS at 20:56

## 2019-05-06 RX ADMIN — MORPHINE SULFATE 2 MG: 10 INJECTION INTRAVENOUS at 23:00

## 2019-05-06 RX ADMIN — ACETAMINOPHEN 1000 MG: 10 INJECTION, SOLUTION INTRAVENOUS at 15:31

## 2019-05-06 RX ADMIN — ACETAMINOPHEN 1000 MG: 10 INJECTION, SOLUTION INTRAVENOUS at 22:49

## 2019-05-06 RX ADMIN — HYDROMORPHONE HYDROCHLORIDE 0.5 MG: 2 INJECTION, SOLUTION INTRAMUSCULAR; INTRAVENOUS; SUBCUTANEOUS at 18:36

## 2019-05-06 RX ADMIN — PROPOFOL 50 MG: 10 INJECTION, EMULSION INTRAVENOUS at 20:10

## 2019-05-06 RX ADMIN — SUCCINYLCHOLINE CHLORIDE 140 MG: 20 INJECTION INTRAMUSCULAR; INTRAVENOUS at 17:43

## 2019-05-06 RX ADMIN — MORPHINE SULFATE 2 MG: 10 INJECTION INTRAVENOUS at 22:43

## 2019-05-06 RX ADMIN — LEVOFLOXACIN 500 MG: 5 INJECTION, SOLUTION INTRAVENOUS at 18:11

## 2019-05-06 RX ADMIN — ROCURONIUM BROMIDE 30 MG: 10 INJECTION, SOLUTION INTRAVENOUS at 17:57

## 2019-05-06 RX ADMIN — MORPHINE SULFATE 2 MG: 10 INJECTION INTRAVENOUS at 22:55

## 2019-05-06 RX ADMIN — Medication 80 MCG: at 17:58

## 2019-05-06 RX ADMIN — FENTANYL CITRATE 25 MCG: 50 INJECTION, SOLUTION INTRAMUSCULAR; INTRAVENOUS at 21:36

## 2019-05-06 RX ADMIN — ROCURONIUM BROMIDE 10 MG: 10 INJECTION, SOLUTION INTRAVENOUS at 19:03

## 2019-05-06 RX ADMIN — FENTANYL CITRATE 100 MCG: 50 INJECTION, SOLUTION INTRAMUSCULAR; INTRAVENOUS at 17:42

## 2019-05-06 RX ADMIN — FENTANYL CITRATE 25 MCG: 50 INJECTION, SOLUTION INTRAMUSCULAR; INTRAVENOUS at 21:45

## 2019-05-06 RX ADMIN — POTASSIUM CHLORIDE 10 MEQ: 10 INJECTION, SOLUTION INTRAVENOUS at 16:11

## 2019-05-06 RX ADMIN — MORPHINE SULFATE 2 MG: 10 INJECTION INTRAVENOUS at 22:38

## 2019-05-06 RX ADMIN — GLYCOPYRROLATE 0.8 MG: 0.2 INJECTION INTRAMUSCULAR; INTRAVENOUS at 20:03

## 2019-05-06 RX ADMIN — MORPHINE SULFATE 2 MG: 10 INJECTION INTRAVENOUS at 22:49

## 2019-05-06 RX ADMIN — PROPOFOL 150 MG: 10 INJECTION, EMULSION INTRAVENOUS at 17:43

## 2019-05-06 RX ADMIN — FENTANYL CITRATE 25 MCG: 50 INJECTION, SOLUTION INTRAMUSCULAR; INTRAVENOUS at 22:21

## 2019-05-06 NOTE — PERIOP NOTES
Notified Dr. Ryder Regalado [facility] of K+ level 2.8. Orders received for Potassium 10 meq/100cc NS q 1 hr x 2 doses and entered into [facility].

## 2019-05-06 NOTE — ANESTHESIA PREPROCEDURE EVALUATION
Anesthetic History No history of anesthetic complications Review of Systems / Medical History Patient summary reviewed, nursing notes reviewed and pertinent labs reviewed Pulmonary Within defined limits Neuro/Psych Within defined limits Cardiovascular Within defined limits Hypertension CAD and cardiac stents Exercise tolerance: >4 METS 
  
GI/Hepatic/Renal 
Within defined limits GERD Endo/Other Diabetes: well controlled, type 2, using insulin Hypothyroidism Obesity and arthritis Other Findings Comments: Hypokalemia Hoarse since last surgery in October Physical Exam 
 
Airway Mallampati: II 
TM Distance: 4 - 6 cm Neck ROM: normal range of motion Mouth opening: Normal 
 
 Cardiovascular Regular rate and rhythm,  S1 and S2 normal,  no murmur, click, rub, or gallop Dental 
 
Dentition: Poor dentition Pulmonary Breath sounds clear to auscultation Abdominal 
GI exam deferred Other Findings Anesthetic Plan ASA: 3 Anesthesia type: general 
 
 
 
 
Induction: Intravenous Anesthetic plan and risks discussed with: Patient 
 
 
hasmukh

## 2019-05-06 NOTE — DISCHARGE INSTRUCTIONS
[facility]    Discharge Instruction Sheet: POSTERIOR SPINAL FUSION    DR. Nick Christensen    Pain control:   Typically, we will prescribe a narcotic usually 1-2 tabs every four hours is    sufficient for the pain. Most patients need this only for the first few weeks. You   should discontinue this as the pain decreases. You should not drive while taking any narcotic pain medications. Constipation  Pain medicines and anesthesia can be constipating-this can be prevented by gentle physical activity and drinking plenty of fluid. It should be treated with over-the-counter medications such as Miralax or suppositories, and/or Fleets enema. You should have a bowel movement at least every other day following surgery. Incision care     Keep this area clean and dry. Your dressing is designed to stay in place for 5-7 days. You will be sent home with one additional dressing to change at that time. Leave this new dressing in place until our follow up visit in the office in about 10-14 days. If staples are in place, they should be removed about 14-20 days after surgery. You may shower with this impermeable dressing in place. DO NOT take a tub bath or go swimming until cleared by your doctor. DO NOT apply lotions, oils, or creams to incision. To increase and promote healing:   Stop Smoking (or at least cut back on smoking).  Eat a well-balanced diet (high in protein and vitamin C)   If your appetite is poor, consider nutritional supplements like Ensure, Glucerna, or [place] Instant Breakfast.   If you are diabetic, controlling you blood sugars is very important to prevent infection and promote wound healing. Nutrition:   If you were on a supplement such as Ensure or Glucerna) while in the hospital, please continue using them with each meal for the next 30 days.    Eat a well-balanced diet - High in protein, high in vitamins and minerals, especially vitamin C and zinc.     Restrictions:   Remember your \"BLT's\"    1. Limited bending at waist    2. Lift no more than 10 pounds    3. No Twisting     If you were given a brace, wear it when out of bed. Warning signs : Please call your physician immediately at [phone] if you have   Bleeding from incision that is constant.  Change in mental status (unusual behavior or confusion)   If your incision develops redness or swelling   Change in wound drainage (increase in amount, color, or foul odor)   [place] over 101.5 degrees Fahrenheit    Headache that is not relieved with pain medication   Tenderness or redness in the calf of your leg    Emergency: CALL 911 if you have   Shortness of breath   Chest pain   Localized chest pain when coughing or taking a deep breath    Follow-up  Please call Dr. Juan Manuel Villatoro office for a follow up appointment in 2 weeks at [phone]. You can return to work when cleared by a physician. During normal business hours you may reach Dr. Lizette Pérez' team directly at [phone] if you have concerns or questions.     Gino Pizarro

## 2019-05-06 NOTE — H&P
Subjective:  
 
Patient ID: Cameron Kasper is a 70 y.o. female. 69 y/o F with back pain Patient Active Problem List  
 Diagnosis Date Noted  S/P lumbar spinal fusion 2019  CAD (coronary artery disease)  Cervical stenosis of spine 10/31/2018  Cervical stenosis of spinal canal 10/30/2018  Hypothyroidism 2018  Diabetes type 2, uncontrolled ([facility] 2015  Diabetic autonomic neuropathy associated with type 2 diabetes mellitus ([facility] 2015  Hypovitaminosis D 2015  Leg pain, bilateral 2015  Essential hypertension 2015  Hypercholesterolemia 2015  LBP (low back pain) 2014  DM (diabetes mellitus) ([facility] 2014 Family History Problem Relation Age of Onset  Diabetes Mother  Hypertension Mother  Hypertension Father  Diabetes Father  Stroke Father  Heart Attack Father  Diabetes Sister  Cancer Sister   
     lung Social History Tobacco Use  Smoking status: Former Smoker Packs/day: 0.50 Years: 25.00 Pack years: 12.50 Last attempt to quit: 1999 Years since quittin.3  Smokeless tobacco: Never Used Substance Use Topics  Alcohol use: Yes Comment: very rare Past Medical History:  
Diagnosis Date  Arthritis  CAD (coronary artery disease) 6 stents (CARMITA) 3/21/18 at [facility]  Diabetes ([facility]  Dizziness  Dyspepsia and other specified disorders of function of stomach  GERD (gastroesophageal reflux disease)  Headache(784.0)  Hx of cardiac cath 2018  
 after abnormal stress test 3/13/18  Hypertension  Loss of appetite  Musculoskeletal disorder   
 back pain  Other ill-defined conditions(799.89)   
 shingles  Thyroid disease   
 hypothyroidism Past Surgical History:  
Procedure Laterality Date  CARDIAC SURG PROCEDURE UNLIST    
 6 stents [address] hysterectomy & 2 c-sections  HX HEENT  1963  
 tonsils  HX ORTHOPAEDIC Bilateral   
 carpal tunnel release  HX ORTHOPAEDIC    
 neck surgery  HX OTHER SURGICAL  1976  
 sweat glands removed @ [facility]  HX OTHER SURGICAL  2015  
 abscess removed from right back  MD DRAIN SKIN ABSCESS SIMPLE  1/9/12 Current Facility-Administered Medications:  
  lactated Ringers infusion, 25 mL/hr, IntraVENous, CONTINUOUS, Emerson Low MD 
  acetaminophen (OFIRMEV) infusion 1,000 mg, 1,000 mg, IntraVENous, ONCE, Kojo Kent MD 
  levoFLOXacin (LEVAQUIN) 500 mg in D5W IVPB, 500 mg, IntraVENous, ONCE, Kojo Kent MD 
  pregabalin (LYRICA) capsule 150 mg, 150 mg, Oral, ONCE, Erna Kent MD 
  vancomycin (VANCOCIN) 1500 mg in  ml infusion, 1,500 mg, IntraVENous, ONCE, Sy Son MD 
 
Allergies Allergen Reactions  Penicillins Rash ROS:  
No new bowel or bladder incontinence. No fevers or chills. No saddle anesthesia. Objective: There were no vitals taken for this visit. There is no height or weight on file to calculate BMI., a BMI over 30 is considered obese and a BMI over 40 has been associated with a higher risk of surgical complications. Constitutional: No acute distress. Well nourished. HEENT: Normocephalic. Respiratory:  No labored breathing. Cardiovascular:  No marked cyanosis. Skin:  No marked skin ulcers/lesions on bilateral upper or lower extremities. Psychiatric: Alert and oriented x3. Inspection: No gross deformity of bilateral upper or lower extremities. Musculoskeletal/Neurological: BLE 5/5/5/5/5 Radiographs:   
  
Xr Chest Pa Lat Result Date: 5/1/2019 Exam:  2 view chest Indication: Preoperative exam. COMPARISON: 10/16/2018 PA and lateral views demonstrate normal heart size. There is no acute process in the lung fields. The osseous structures are unremarkable. There is mild dextroconvex curvature of the thoracic spine. Impression: No acute process. Assessment:  
 
No diagnosis found. Plan:  
 
I have discussed the L2-5 lateral fusion in detail with Kim Wheatley and mentioned complications, including but not limited to: death, permanent disability, heart attack, stroke, lung injury or infection, blindness, ileus, bladder or bowel problems, ureter injury, bleeding, nerve injury (including numbness, pain and weakness), paralysis (which may be permanent), failure to heal, failure to fuse bone together in fusion procedures, failure to relief symptoms, failure to relief pain, increased pain, need for further surgeries, failure or breakage or hardware, malpositioning of hardware, need to fuse or operate on additional levels determined either during or after surgery, destabilization of the spine (which may require fusion or later surgery), infections (which may or may not require additional surgery), dural tears (tears of the sac holding in nerves and spinal fluid), meningitis, voice changes, vocal cord injury, hoarseness, blood clots, pulmonary embolus, Xavi syndrome, recurrent disc herniation, diaphragm paralysis, and anesthetic complications. Comorbidities such as obesity, smoking, rheumatoid arthritis, chronic steroid use and diabetes increase these risks. Kim Wheatley understands and wants to proceed.   
 
 
 
   
Lawson Medrano MD

## 2019-05-06 NOTE — PERIOP NOTES
15:25= Crissy hugger applied for warmth; no mepilex applied due to surgery position. 15:32= K 2.8 on Chem8; Dr Lacey Villanueva informed; orders received for 2 runs KCL 10 MEQ IVPB.

## 2019-05-07 ENCOUNTER — ANESTHESIA EVENT (OUTPATIENT)
Dept: SURGERY | Age: 64
DRG: 455 | End: 2019-05-07
Payer: MEDICARE

## 2019-05-07 ENCOUNTER — APPOINTMENT (OUTPATIENT)
Dept: GENERAL RADIOLOGY | Age: 64
DRG: 455 | End: 2019-05-07
Attending: ORTHOPAEDIC SURGERY
Payer: MEDICARE

## 2019-05-07 ENCOUNTER — ANESTHESIA (OUTPATIENT)
Dept: SURGERY | Age: 64
DRG: 455 | End: 2019-05-07
Payer: MEDICARE

## 2019-05-07 LAB
ANION GAP BLD CALC-SCNC: 21 MMOL/L (ref 10–20)
BUN BLD-MCNC: 7 MG/DL (ref 9–20)
CA-I BLD-MCNC: 1.16 MMOL/L (ref 1.12–1.32)
CHLORIDE BLD-SCNC: 103 MMOL/L (ref 98–107)
CO2 BLD-SCNC: 27 MMOL/L (ref 21–32)
CREAT BLD-MCNC: 0.7 MG/DL (ref 0.6–1.3)
GLUCOSE BLD STRIP.AUTO-MCNC: 132 MG/DL (ref 65–100)
GLUCOSE BLD STRIP.AUTO-MCNC: 181 MG/DL (ref 65–100)
GLUCOSE BLD STRIP.AUTO-MCNC: 192 MG/DL (ref 65–100)
GLUCOSE BLD STRIP.AUTO-MCNC: 196 MG/DL (ref 65–100)
GLUCOSE BLD-MCNC: 150 MG/DL (ref 65–100)
HCT VFR BLD CALC: 23 % (ref 35–47)
HGB BLD-MCNC: 7.8 G/DL (ref 11.5–16)
POTASSIUM BLD-SCNC: 3.3 MMOL/L (ref 3.5–5.1)
SERVICE CMNT-IMP: ABNORMAL
SODIUM BLD-SCNC: 146 MMOL/L (ref 136–145)

## 2019-05-07 PROCEDURE — 77030035129: Performed by: ORTHOPAEDIC SURGERY

## 2019-05-07 PROCEDURE — 76010000131 HC OR TIME 2 TO 2.5 HR: Performed by: ORTHOPAEDIC SURGERY

## 2019-05-07 PROCEDURE — 77030012407 HC DRN WND BARD -B: Performed by: ORTHOPAEDIC SURGERY

## 2019-05-07 PROCEDURE — 74011000250 HC RX REV CODE- 250: Performed by: ORTHOPAEDIC SURGERY

## 2019-05-07 PROCEDURE — 76060000035 HC ANESTHESIA 2 TO 2.5 HR: Performed by: ORTHOPAEDIC SURGERY

## 2019-05-07 PROCEDURE — 77030003666 HC NDL SPINAL BD -A: Performed by: ORTHOPAEDIC SURGERY

## 2019-05-07 PROCEDURE — 74011250636 HC RX REV CODE- 250/636

## 2019-05-07 PROCEDURE — 76210000016 HC OR PH I REC 1 TO 1.5 HR: Performed by: ORTHOPAEDIC SURGERY

## 2019-05-07 PROCEDURE — 74011250636 HC RX REV CODE- 250/636: Performed by: ORTHOPAEDIC SURGERY

## 2019-05-07 PROCEDURE — C1713 ANCHOR/SCREW BN/BN,TIS/BN: HCPCS | Performed by: ORTHOPAEDIC SURGERY

## 2019-05-07 PROCEDURE — 72100 X-RAY EXAM L-S SPINE 2/3 VWS: CPT

## 2019-05-07 PROCEDURE — 77030008467 HC STPLR SKN COVD -B: Performed by: ORTHOPAEDIC SURGERY

## 2019-05-07 PROCEDURE — 97116 GAIT TRAINING THERAPY: CPT | Performed by: PHYSICAL THERAPIST

## 2019-05-07 PROCEDURE — 77030014647 HC SEAL FBRN TISSL BAXT -D: Performed by: ORTHOPAEDIC SURGERY

## 2019-05-07 PROCEDURE — 76010000171 HC OR TIME 2 TO 2.5 HR INTENSV-TIER 1: Performed by: ORTHOPAEDIC SURGERY

## 2019-05-07 PROCEDURE — 85018 HEMOGLOBIN: CPT

## 2019-05-07 PROCEDURE — 77030029099 HC BN WAX SSPC -A: Performed by: ORTHOPAEDIC SURGERY

## 2019-05-07 PROCEDURE — 77030008684 HC TU ET CUF COVD -B: Performed by: ANESTHESIOLOGY

## 2019-05-07 PROCEDURE — 0SG1071 FUSION OF 2 OR MORE LUMBAR VERTEBRAL JOINTS WITH AUTOLOGOUS TISSUE SUBSTITUTE, POSTERIOR APPROACH, POSTERIOR COLUMN, OPEN APPROACH: ICD-10-PCS | Performed by: ORTHOPAEDIC SURGERY

## 2019-05-07 PROCEDURE — 74011000272 HC RX REV CODE- 272: Performed by: ORTHOPAEDIC SURGERY

## 2019-05-07 PROCEDURE — 77030004391 HC BUR FLUT MEDT -C: Performed by: ORTHOPAEDIC SURGERY

## 2019-05-07 PROCEDURE — 77030032490 HC SLV COMPR SCD KNE COVD -B: Performed by: ORTHOPAEDIC SURGERY

## 2019-05-07 PROCEDURE — 76000 FLUOROSCOPY <1 HR PHYS/QHP: CPT

## 2019-05-07 PROCEDURE — 77030022704 HC SUT VLOC COVD -B: Performed by: ORTHOPAEDIC SURGERY

## 2019-05-07 PROCEDURE — 77030020275 HC MISC ORTHOPEDIC: Performed by: ORTHOPAEDIC SURGERY

## 2019-05-07 PROCEDURE — 74011250637 HC RX REV CODE- 250/637: Performed by: ORTHOPAEDIC SURGERY

## 2019-05-07 PROCEDURE — 77030026438 HC STYL ET INTUB CARD -A: Performed by: ANESTHESIOLOGY

## 2019-05-07 PROCEDURE — 77030019908 HC STETH ESOPH SIMS -A: Performed by: ANESTHESIOLOGY

## 2019-05-07 PROCEDURE — 74011636637 HC RX REV CODE- 636/637: Performed by: ORTHOPAEDIC SURGERY

## 2019-05-07 PROCEDURE — 77030012961 HC IRR KT CYSTO/TUR ICUM -A: Performed by: ORTHOPAEDIC SURGERY

## 2019-05-07 PROCEDURE — 97162 PT EVAL MOD COMPLEX 30 MIN: CPT | Performed by: PHYSICAL THERAPIST

## 2019-05-07 PROCEDURE — 74011250636 HC RX REV CODE- 250/636: Performed by: ANESTHESIOLOGY

## 2019-05-07 PROCEDURE — 77030040356 HC CORD BPLR FRCP COVD -A: Performed by: ORTHOPAEDIC SURGERY

## 2019-05-07 PROCEDURE — 80047 BASIC METABLC PNL IONIZED CA: CPT

## 2019-05-07 PROCEDURE — 82962 GLUCOSE BLOOD TEST: CPT

## 2019-05-07 PROCEDURE — 77030018836 HC SOL IRR NACL ICUM -A: Performed by: ORTHOPAEDIC SURGERY

## 2019-05-07 PROCEDURE — 77030033138 HC SUT PGA STRATFX J&J -B: Performed by: ORTHOPAEDIC SURGERY

## 2019-05-07 PROCEDURE — 77010033678 HC OXYGEN DAILY

## 2019-05-07 PROCEDURE — 94760 N-INVAS EAR/PLS OXIMETRY 1: CPT

## 2019-05-07 PROCEDURE — 97165 OT EVAL LOW COMPLEX 30 MIN: CPT

## 2019-05-07 PROCEDURE — 77030018723 HC ELCTRD BLD COVD -A: Performed by: ORTHOPAEDIC SURGERY

## 2019-05-07 PROCEDURE — 77030037728 HC GRFT BN FBR CORT 3DEMIN 30CC BACT -I: Performed by: ORTHOPAEDIC SURGERY

## 2019-05-07 PROCEDURE — 77030013079 HC BLNKT BAIR HGGR 3M -A: Performed by: ANESTHESIOLOGY

## 2019-05-07 PROCEDURE — 77030039267 HC ADH SKN EXOFIN S2SG -B: Performed by: ORTHOPAEDIC SURGERY

## 2019-05-07 PROCEDURE — 77030002933 HC SUT MCRYL J&J -A: Performed by: ORTHOPAEDIC SURGERY

## 2019-05-07 PROCEDURE — 74011000250 HC RX REV CODE- 250

## 2019-05-07 PROCEDURE — 77030018846 HC SOL IRR STRL H20 ICUM -A: Performed by: ORTHOPAEDIC SURGERY

## 2019-05-07 PROCEDURE — 07DR3ZZ EXTRACTION OF ILIAC BONE MARROW, PERCUTANEOUS APPROACH: ICD-10-PCS | Performed by: ORTHOPAEDIC SURGERY

## 2019-05-07 PROCEDURE — 77030020263 HC SOL INJ SOD CL0.9% LFCR 1000ML: Performed by: ORTHOPAEDIC SURGERY

## 2019-05-07 PROCEDURE — 36415 COLL VENOUS BLD VENIPUNCTURE: CPT

## 2019-05-07 PROCEDURE — 65270000029 HC RM PRIVATE

## 2019-05-07 PROCEDURE — 97535 SELF CARE MNGMENT TRAINING: CPT

## 2019-05-07 DEVICE — HEAD SPNL SCR TI ALLY POLYAX FULL THRD REDUC FOR 5.5MM MTRX: Type: IMPLANTABLE DEVICE | Site: SPINE LUMBAR | Status: FUNCTIONAL

## 2019-05-07 DEVICE — IMPLANTABLE DEVICE: Type: IMPLANTABLE DEVICE | Site: SPINE LUMBAR | Status: FUNCTIONAL

## 2019-05-07 DEVICE — SCREW SPNL L45MM DIA7MM PEDCL TI ALLY POLYAX CANN THRD MTRX: Type: IMPLANTABLE DEVICE | Site: SPINE LUMBAR | Status: FUNCTIONAL

## 2019-05-07 DEVICE — GRAFT BNE 3D 30 CC CORTICAL FIBER: Type: IMPLANTABLE DEVICE | Site: SPINE LUMBAR | Status: FUNCTIONAL

## 2019-05-07 DEVICE — CAP SPNL CO CHROM ALLY FLAT 1 STP LOK FOR 5.5MM ROD MTRX: Type: IMPLANTABLE DEVICE | Site: SPINE LUMBAR | Status: FUNCTIONAL

## 2019-05-07 RX ORDER — DIAZEPAM 5 MG/1
5 TABLET ORAL
Status: DISCONTINUED | OUTPATIENT
Start: 2019-05-07 | End: 2019-05-07 | Stop reason: SDUPTHER

## 2019-05-07 RX ORDER — FENTANYL CITRATE 50 UG/ML
25 INJECTION, SOLUTION INTRAMUSCULAR; INTRAVENOUS
Status: DISCONTINUED | OUTPATIENT
Start: 2019-05-07 | End: 2019-05-07 | Stop reason: HOSPADM

## 2019-05-07 RX ORDER — SODIUM CHLORIDE 0.9 % (FLUSH) 0.9 %
5-40 SYRINGE (ML) INJECTION AS NEEDED
Status: DISCONTINUED | OUTPATIENT
Start: 2019-05-07 | End: 2019-05-07 | Stop reason: SDUPTHER

## 2019-05-07 RX ORDER — HYDROMORPHONE HYDROCHLORIDE 1 MG/ML
0.2 INJECTION, SOLUTION INTRAMUSCULAR; INTRAVENOUS; SUBCUTANEOUS
Status: DISCONTINUED | OUTPATIENT
Start: 2019-05-07 | End: 2019-05-07 | Stop reason: HOSPADM

## 2019-05-07 RX ORDER — GLYCOPYRROLATE 0.2 MG/ML
INJECTION INTRAMUSCULAR; INTRAVENOUS AS NEEDED
Status: DISCONTINUED | OUTPATIENT
Start: 2019-05-07 | End: 2019-05-07 | Stop reason: HOSPADM

## 2019-05-07 RX ORDER — AMOXICILLIN 250 MG
1 CAPSULE ORAL 2 TIMES DAILY
Status: DISCONTINUED | OUTPATIENT
Start: 2019-05-07 | End: 2019-05-11 | Stop reason: HOSPADM

## 2019-05-07 RX ORDER — SODIUM CHLORIDE, SODIUM LACTATE, POTASSIUM CHLORIDE, CALCIUM CHLORIDE 600; 310; 30; 20 MG/100ML; MG/100ML; MG/100ML; MG/100ML
25 INJECTION, SOLUTION INTRAVENOUS CONTINUOUS
Status: DISCONTINUED | OUTPATIENT
Start: 2019-05-07 | End: 2019-05-07 | Stop reason: HOSPADM

## 2019-05-07 RX ORDER — NALOXONE HYDROCHLORIDE 0.4 MG/ML
0.4 INJECTION, SOLUTION INTRAMUSCULAR; INTRAVENOUS; SUBCUTANEOUS AS NEEDED
Status: DISCONTINUED | OUTPATIENT
Start: 2019-05-07 | End: 2019-05-11 | Stop reason: HOSPADM

## 2019-05-07 RX ORDER — HYDROMORPHONE HYDROCHLORIDE 1 MG/ML
1 INJECTION, SOLUTION INTRAMUSCULAR; INTRAVENOUS; SUBCUTANEOUS
Status: DISPENSED | OUTPATIENT
Start: 2019-05-07 | End: 2019-05-08

## 2019-05-07 RX ORDER — CEFAZOLIN SODIUM 1 G/3ML
INJECTION, POWDER, FOR SOLUTION INTRAMUSCULAR; INTRAVENOUS AS NEEDED
Status: DISCONTINUED | OUTPATIENT
Start: 2019-05-07 | End: 2019-05-07 | Stop reason: HOSPADM

## 2019-05-07 RX ORDER — INSULIN GLARGINE 100 [IU]/ML
18 INJECTION, SOLUTION SUBCUTANEOUS
Status: DISCONTINUED | OUTPATIENT
Start: 2019-05-07 | End: 2019-05-11 | Stop reason: HOSPADM

## 2019-05-07 RX ORDER — ROCURONIUM BROMIDE 10 MG/ML
INJECTION, SOLUTION INTRAVENOUS AS NEEDED
Status: DISCONTINUED | OUTPATIENT
Start: 2019-05-07 | End: 2019-05-07 | Stop reason: HOSPADM

## 2019-05-07 RX ORDER — SODIUM CHLORIDE 0.9 % (FLUSH) 0.9 %
5-40 SYRINGE (ML) INJECTION AS NEEDED
Status: DISCONTINUED | OUTPATIENT
Start: 2019-05-07 | End: 2019-05-07 | Stop reason: HOSPADM

## 2019-05-07 RX ORDER — PROPOFOL 10 MG/ML
INJECTION, EMULSION INTRAVENOUS AS NEEDED
Status: DISCONTINUED | OUTPATIENT
Start: 2019-05-07 | End: 2019-05-07 | Stop reason: HOSPADM

## 2019-05-07 RX ORDER — MIDAZOLAM HYDROCHLORIDE 1 MG/ML
INJECTION, SOLUTION INTRAMUSCULAR; INTRAVENOUS AS NEEDED
Status: DISCONTINUED | OUTPATIENT
Start: 2019-05-07 | End: 2019-05-07 | Stop reason: HOSPADM

## 2019-05-07 RX ORDER — SODIUM CHLORIDE 0.9 % (FLUSH) 0.9 %
5-40 SYRINGE (ML) INJECTION EVERY 8 HOURS
Status: DISCONTINUED | OUTPATIENT
Start: 2019-05-07 | End: 2019-05-07 | Stop reason: SDUPTHER

## 2019-05-07 RX ORDER — LIDOCAINE HYDROCHLORIDE 20 MG/ML
INJECTION, SOLUTION EPIDURAL; INFILTRATION; INTRACAUDAL; PERINEURAL AS NEEDED
Status: DISCONTINUED | OUTPATIENT
Start: 2019-05-07 | End: 2019-05-07 | Stop reason: HOSPADM

## 2019-05-07 RX ORDER — CYCLOBENZAPRINE HCL 10 MG
10 TABLET ORAL
Status: DISCONTINUED | OUTPATIENT
Start: 2019-05-07 | End: 2019-05-11 | Stop reason: HOSPADM

## 2019-05-07 RX ORDER — SODIUM CHLORIDE 9 MG/ML
125 INJECTION, SOLUTION INTRAVENOUS CONTINUOUS
Status: DISPENSED | OUTPATIENT
Start: 2019-05-07 | End: 2019-05-08

## 2019-05-07 RX ORDER — GABAPENTIN 100 MG/1
100 CAPSULE ORAL 3 TIMES DAILY
Status: DISCONTINUED | OUTPATIENT
Start: 2019-05-07 | End: 2019-05-11 | Stop reason: HOSPADM

## 2019-05-07 RX ORDER — FACIAL-BODY WIPES
10 EACH TOPICAL DAILY PRN
Status: DISCONTINUED | OUTPATIENT
Start: 2019-05-09 | End: 2019-05-11 | Stop reason: HOSPADM

## 2019-05-07 RX ORDER — HYDROMORPHONE HYDROCHLORIDE 2 MG/ML
INJECTION, SOLUTION INTRAMUSCULAR; INTRAVENOUS; SUBCUTANEOUS AS NEEDED
Status: DISCONTINUED | OUTPATIENT
Start: 2019-05-07 | End: 2019-05-07 | Stop reason: HOSPADM

## 2019-05-07 RX ORDER — ACETAMINOPHEN 500 MG
1000 TABLET ORAL EVERY 6 HOURS
Status: DISCONTINUED | OUTPATIENT
Start: 2019-05-07 | End: 2019-05-11 | Stop reason: HOSPADM

## 2019-05-07 RX ORDER — SODIUM CHLORIDE 0.9 % (FLUSH) 0.9 %
5-40 SYRINGE (ML) INJECTION EVERY 8 HOURS
Status: DISCONTINUED | OUTPATIENT
Start: 2019-05-07 | End: 2019-05-07 | Stop reason: HOSPADM

## 2019-05-07 RX ORDER — INSULIN LISPRO 100 [IU]/ML
3 INJECTION, SOLUTION INTRAVENOUS; SUBCUTANEOUS
Status: DISCONTINUED | OUTPATIENT
Start: 2019-05-07 | End: 2019-05-11 | Stop reason: HOSPADM

## 2019-05-07 RX ORDER — VANCOMYCIN/0.9 % SOD CHLORIDE 1.5G/250ML
1500 PLASTIC BAG, INJECTION (ML) INTRAVENOUS EVERY 12 HOURS
Status: COMPLETED | OUTPATIENT
Start: 2019-05-07 | End: 2019-05-08

## 2019-05-07 RX ORDER — SODIUM CHLORIDE 0.9 % (FLUSH) 0.9 %
5-40 SYRINGE (ML) INJECTION EVERY 8 HOURS
Status: DISCONTINUED | OUTPATIENT
Start: 2019-05-07 | End: 2019-05-11 | Stop reason: HOSPADM

## 2019-05-07 RX ORDER — OXYCODONE HYDROCHLORIDE 5 MG/1
5 TABLET ORAL
Status: DISCONTINUED | OUTPATIENT
Start: 2019-05-07 | End: 2019-05-11 | Stop reason: HOSPADM

## 2019-05-07 RX ORDER — SUCCINYLCHOLINE CHLORIDE 20 MG/ML
INJECTION INTRAMUSCULAR; INTRAVENOUS AS NEEDED
Status: DISCONTINUED | OUTPATIENT
Start: 2019-05-07 | End: 2019-05-07 | Stop reason: HOSPADM

## 2019-05-07 RX ORDER — FENTANYL CITRATE 50 UG/ML
INJECTION, SOLUTION INTRAMUSCULAR; INTRAVENOUS AS NEEDED
Status: DISCONTINUED | OUTPATIENT
Start: 2019-05-07 | End: 2019-05-07 | Stop reason: HOSPADM

## 2019-05-07 RX ORDER — LIDOCAINE HYDROCHLORIDE 10 MG/ML
0.1 INJECTION, SOLUTION EPIDURAL; INFILTRATION; INTRACAUDAL; PERINEURAL AS NEEDED
Status: DISCONTINUED | OUTPATIENT
Start: 2019-05-07 | End: 2019-05-07 | Stop reason: HOSPADM

## 2019-05-07 RX ORDER — SODIUM CHLORIDE 0.9 % (FLUSH) 0.9 %
5-40 SYRINGE (ML) INJECTION AS NEEDED
Status: DISCONTINUED | OUTPATIENT
Start: 2019-05-07 | End: 2019-05-11 | Stop reason: HOSPADM

## 2019-05-07 RX ORDER — FAMOTIDINE 20 MG/1
20 TABLET, FILM COATED ORAL 2 TIMES DAILY
Status: DISCONTINUED | OUTPATIENT
Start: 2019-05-07 | End: 2019-05-11 | Stop reason: HOSPADM

## 2019-05-07 RX ORDER — SODIUM CHLORIDE, SODIUM LACTATE, POTASSIUM CHLORIDE, CALCIUM CHLORIDE 600; 310; 30; 20 MG/100ML; MG/100ML; MG/100ML; MG/100ML
25 INJECTION, SOLUTION INTRAVENOUS CONTINUOUS
Status: DISCONTINUED | OUTPATIENT
Start: 2019-05-07 | End: 2019-05-08

## 2019-05-07 RX ORDER — OXYCODONE HYDROCHLORIDE 5 MG/1
10-15 TABLET ORAL
Status: DISCONTINUED | OUTPATIENT
Start: 2019-05-07 | End: 2019-05-11 | Stop reason: HOSPADM

## 2019-05-07 RX ORDER — DIPHENHYDRAMINE HYDROCHLORIDE 50 MG/ML
12.5 INJECTION, SOLUTION INTRAMUSCULAR; INTRAVENOUS AS NEEDED
Status: DISCONTINUED | OUTPATIENT
Start: 2019-05-07 | End: 2019-05-07 | Stop reason: HOSPADM

## 2019-05-07 RX ORDER — POLYETHYLENE GLYCOL 3350 17 G/17G
17 POWDER, FOR SOLUTION ORAL DAILY
Status: DISCONTINUED | OUTPATIENT
Start: 2019-05-08 | End: 2019-05-11 | Stop reason: HOSPADM

## 2019-05-07 RX ORDER — ONDANSETRON 2 MG/ML
4 INJECTION INTRAMUSCULAR; INTRAVENOUS
Status: ACTIVE | OUTPATIENT
Start: 2019-05-07 | End: 2019-05-08

## 2019-05-07 RX ORDER — NEOSTIGMINE METHYLSULFATE 1 MG/ML
INJECTION INTRAVENOUS AS NEEDED
Status: DISCONTINUED | OUTPATIENT
Start: 2019-05-07 | End: 2019-05-07 | Stop reason: HOSPADM

## 2019-05-07 RX ORDER — HYDROXYZINE HYDROCHLORIDE 10 MG/1
10 TABLET, FILM COATED ORAL
Status: DISCONTINUED | OUTPATIENT
Start: 2019-05-07 | End: 2019-05-11 | Stop reason: HOSPADM

## 2019-05-07 RX ORDER — ONDANSETRON 2 MG/ML
INJECTION INTRAMUSCULAR; INTRAVENOUS AS NEEDED
Status: DISCONTINUED | OUTPATIENT
Start: 2019-05-07 | End: 2019-05-07 | Stop reason: HOSPADM

## 2019-05-07 RX ADMIN — METFORMIN HYDROCHLORIDE 500 MG: 500 TABLET ORAL at 18:47

## 2019-05-07 RX ADMIN — CYCLOBENZAPRINE HYDROCHLORIDE 10 MG: 10 TABLET, FILM COATED ORAL at 20:58

## 2019-05-07 RX ADMIN — SODIUM CHLORIDE, SODIUM LACTATE, POTASSIUM CHLORIDE, AND CALCIUM CHLORIDE: 600; 310; 30; 20 INJECTION, SOLUTION INTRAVENOUS at 16:07

## 2019-05-07 RX ADMIN — SUCCINYLCHOLINE CHLORIDE 160 MG: 20 INJECTION INTRAMUSCULAR; INTRAVENOUS at 14:08

## 2019-05-07 RX ADMIN — CARVEDILOL 12.5 MG: 12.5 TABLET, FILM COATED ORAL at 11:15

## 2019-05-07 RX ADMIN — NEOSTIGMINE METHYLSULFATE 4 MG: 1 INJECTION INTRAVENOUS at 16:01

## 2019-05-07 RX ADMIN — HYDROMORPHONE HYDROCHLORIDE 1 MG: 1 INJECTION, SOLUTION INTRAMUSCULAR; INTRAVENOUS; SUBCUTANEOUS at 02:17

## 2019-05-07 RX ADMIN — ACETAMINOPHEN 1000 MG: 500 TABLET ORAL at 18:48

## 2019-05-07 RX ADMIN — INSULIN LISPRO 2 UNITS: 100 INJECTION, SOLUTION INTRAVENOUS; SUBCUTANEOUS at 07:59

## 2019-05-07 RX ADMIN — VANCOMYCIN HYDROCHLORIDE 1500 MG: 10 INJECTION, POWDER, LYOPHILIZED, FOR SOLUTION INTRAVENOUS at 22:05

## 2019-05-07 RX ADMIN — SODIUM CHLORIDE 125 ML/HR: 900 INJECTION, SOLUTION INTRAVENOUS at 20:11

## 2019-05-07 RX ADMIN — FENTANYL CITRATE 50 MCG: 50 INJECTION, SOLUTION INTRAMUSCULAR; INTRAVENOUS at 14:05

## 2019-05-07 RX ADMIN — FAMOTIDINE 20 MG: 20 TABLET ORAL at 11:16

## 2019-05-07 RX ADMIN — SENNOSIDES AND DOCUSATE SODIUM 1 TABLET: 8.6; 5 TABLET ORAL at 18:47

## 2019-05-07 RX ADMIN — POTASSIUM CHLORIDE 10 MEQ: 750 TABLET, EXTENDED RELEASE ORAL at 21:46

## 2019-05-07 RX ADMIN — Medication 2 G: at 01:35

## 2019-05-07 RX ADMIN — HYDROMORPHONE HYDROCHLORIDE 0.4 MG: 2 INJECTION, SOLUTION INTRAMUSCULAR; INTRAVENOUS; SUBCUTANEOUS at 16:04

## 2019-05-07 RX ADMIN — FAMOTIDINE 20 MG: 20 TABLET ORAL at 18:47

## 2019-05-07 RX ADMIN — PROPOFOL 120 MG: 10 INJECTION, EMULSION INTRAVENOUS at 14:08

## 2019-05-07 RX ADMIN — CEFAZOLIN SODIUM 2 G: 1 INJECTION, POWDER, FOR SOLUTION INTRAMUSCULAR; INTRAVENOUS at 14:19

## 2019-05-07 RX ADMIN — ONDANSETRON 4 MG: 2 INJECTION INTRAMUSCULAR; INTRAVENOUS at 15:51

## 2019-05-07 RX ADMIN — GLYCOPYRROLATE 0.6 MG: 0.2 INJECTION INTRAMUSCULAR; INTRAVENOUS at 16:01

## 2019-05-07 RX ADMIN — FENTANYL CITRATE 50 MCG: 50 INJECTION, SOLUTION INTRAMUSCULAR; INTRAVENOUS at 14:08

## 2019-05-07 RX ADMIN — ROCURONIUM BROMIDE 5 MG: 10 INJECTION, SOLUTION INTRAVENOUS at 14:08

## 2019-05-07 RX ADMIN — Medication 10 ML: at 01:37

## 2019-05-07 RX ADMIN — HYDROMORPHONE HYDROCHLORIDE 0.2 MG: 2 INJECTION, SOLUTION INTRAMUSCULAR; INTRAVENOUS; SUBCUTANEOUS at 15:29

## 2019-05-07 RX ADMIN — MIDAZOLAM HYDROCHLORIDE 1 MG: 1 INJECTION, SOLUTION INTRAMUSCULAR; INTRAVENOUS at 14:01

## 2019-05-07 RX ADMIN — ROCURONIUM BROMIDE 30 MG: 10 INJECTION, SOLUTION INTRAVENOUS at 14:21

## 2019-05-07 RX ADMIN — INSULIN GLARGINE 18 UNITS: 100 INJECTION, SOLUTION SUBCUTANEOUS at 21:47

## 2019-05-07 RX ADMIN — HYDROMORPHONE HYDROCHLORIDE 0.4 MG: 2 INJECTION, SOLUTION INTRAMUSCULAR; INTRAVENOUS; SUBCUTANEOUS at 15:58

## 2019-05-07 RX ADMIN — SODIUM CHLORIDE 125 ML/HR: 900 INJECTION, SOLUTION INTRAVENOUS at 06:10

## 2019-05-07 RX ADMIN — LEVOTHYROXINE SODIUM 112 MCG: 112 TABLET ORAL at 07:47

## 2019-05-07 RX ADMIN — SODIUM CHLORIDE, SODIUM LACTATE, POTASSIUM CHLORIDE, AND CALCIUM CHLORIDE 25 ML/HR: 600; 310; 30; 20 INJECTION, SOLUTION INTRAVENOUS at 12:49

## 2019-05-07 RX ADMIN — OXYCODONE HYDROCHLORIDE 10 MG: 5 TABLET ORAL at 20:06

## 2019-05-07 RX ADMIN — Medication 10 ML: at 06:14

## 2019-05-07 RX ADMIN — HYDROMORPHONE HYDROCHLORIDE 1 MG: 1 INJECTION, SOLUTION INTRAMUSCULAR; INTRAVENOUS; SUBCUTANEOUS at 06:14

## 2019-05-07 RX ADMIN — Medication 10 ML: at 22:06

## 2019-05-07 RX ADMIN — CARVEDILOL 12.5 MG: 12.5 TABLET, FILM COATED ORAL at 18:48

## 2019-05-07 RX ADMIN — LIDOCAINE HYDROCHLORIDE 60 MG: 20 INJECTION, SOLUTION EPIDURAL; INFILTRATION; INTRACAUDAL; PERINEURAL at 14:08

## 2019-05-07 RX ADMIN — POTASSIUM CHLORIDE 10 MEQ: 750 TABLET, EXTENDED RELEASE ORAL at 11:20

## 2019-05-07 RX ADMIN — OXYCODONE HYDROCHLORIDE 5 MG: 5 TABLET ORAL at 09:28

## 2019-05-07 RX ADMIN — FENTANYL CITRATE 25 MCG: 50 INJECTION, SOLUTION INTRAMUSCULAR; INTRAVENOUS at 13:37

## 2019-05-07 RX ADMIN — ACETAMINOPHEN 1000 MG: 500 TABLET ORAL at 11:14

## 2019-05-07 RX ADMIN — ROSUVASTATIN CALCIUM 20 MG: 10 TABLET, COATED ORAL at 22:04

## 2019-05-07 RX ADMIN — Medication 2 G: at 07:48

## 2019-05-07 RX ADMIN — ACETAMINOPHEN 1000 MG: 500 TABLET ORAL at 08:05

## 2019-05-07 RX ADMIN — GABAPENTIN 100 MG: 100 CAPSULE ORAL at 21:47

## 2019-05-07 RX ADMIN — POTASSIUM CHLORIDE 10 MEQ: 750 TABLET, EXTENDED RELEASE ORAL at 18:48

## 2019-05-07 NOTE — PROGRESS NOTES
Reached out to the Diabetic Treatment Team regarding recommendations from their note. The patient takes Humalog 75/25 currently at home, which we do not have on formulary, on admission Lantus, and Humalog were ordered to replace her home regimen. The recommendations did not include if SSI Humalog should be resumed. I was able to speak with Dominik Beauchamp, who advised to keep the current ordered regimen to replace her home regimen.    
 
Symone Bautista, NP

## 2019-05-07 NOTE — OP NOTES
[address]  OPERATIVE REPORT    Name:  Lisha Delgado  MR#:  [id number]  :  1955  ACCOUNT #:  [de-identified]  DATE OF SERVICE:  2019      PREOPERATIVE DIAGNOSES:  1. Spinal stenosis with claudication, L2-L5. 2. L4-L5 spondylolisthesis. 3.  Lumbago. 4.  Sciatica. POSTOPERATIVE DIAGNOSES:  1. Spinal stenosis with claudication, L2-L5. 2. L4-L5 spondylolisthesis. 3.  Lumbago. 4.  Sciatica. PROCEDURES PERFORMED:  1. L2-L5 anterior fusion. 2.  L2-L5 insertion of interbody biomechanical device x3.  3. L2-L4 anterior instrumentation. 4.  Use of allograft bone for spine fusion. 5.  Bone marrow aspirate from the left posterior superior iliac spine for spinal fusion augmentation. SURGEON:  Eran Davis MD    ASSISTANT:  Tania Landry. ANESTHESIA:  General.    DRAINS:  None. COMPLICATIONS:  None. SPECIMENS REMOVED:  None. IMPLANTS:  Globus RISE-L interbody biomechanical device and Globus anterior plate. ESTIMATED BLOOD LOSS:  300 mL. INDICATIONS FOR PROCEDURE:  The patient is a pleasant 52-year-old lady with lumbar spinal stenosis and spondylolisthesis that has failed to improve with nonoperative treatment. At this point, she would like to proceed with surgical intervention. I have given her warnings about the possible complications including but not limited to pain, scar, bleeding, infection, nonunion, damage to surrounding structures, death, paralysis, blindness, stroke. She understands and wants to proceed. NARRATIVE OF THE PROCEDURE:  After informed consent was obtained and the operative site was properly marked, the patient was moved back to the operating room and underwent general endotracheal anesthesia. She was positioned in the lateral decubitus with the left side up and all the bony extremities were well padded.   She was safely secured to the bed and then we proceeded to obtain a time-out, verifying that this was the correct patient, the correct surgery, the correct side, as well as that she had received antibiotics within 30 minutes of the incision. In this case, she had received vancomycin and Levaquin as Ancef was not indicated due to an allergy of PENICILLIN. We then proceeded to prep and drape in the usual manner followed by obtaining a time out, verifying that this was the correct patient, the correct surgery, the correct side, as well as that she had received IV antibiotics within 30 minutes of the incision. I then proceeded to perform a standard lateral approach for an OLIF exposing the area between L2 and L5. Once that area was exposed, hemostasis was obtained, I then placed my self-retaining retractor at the L2-L3 disc and began the procedure with a #15 blade performing a box annulotomy. I removed the annulus with a pituitary. I then proceeded to remove the pituitary and then used a box shaver to remove the remainder of the disk. Ivar [place] was used to detach the superior and inferior cartilaginous endplates. I then proceeded to use a Jamshidi needle through the stab incision over the left posterosuperior iliac spine and aspirated 6 mL of bone marrow. This was used to augment all the bone graft. A cage was then packed with the bone graft and bone marrow aspirate and then inserted at the L2-L3 disc space. Once in place, we were able to distract it to its final height. Once that was completed, I proceeded to place a plate and drill for screws at L2 and L3, locking them to the plate. Once that was completed, I repeated the procedure in the exact same manner at L3-L4. I then tried to do it at L4-L5; however, the angle of attack was very difficult and we happened to have violation of the endplate at L5 with the cage having some slight subsidence. We were able to distract it to obtain appropriate position anteriorly to stabilize the spondylolisthesis.   Once that was completed, I decided not to plate that level due to the proximity to the iliac vessels. I then proceeded to irrigate the wound and verified that the hemostasis was obtained. I obtained final AP and lateral x-rays and saved them to PACS. I then proceeded to close the abdominal musculature in layers with #1 Vicryl figure-of-eight interrupted sutures while irrigating the subcu, closed the subcu with 2-0 Vicryl and a 3-0 running Monocryl on the skin. Dermabond was applied. The patient was then awakened, transferred to PACU in stable condition. POSTOPERATIVE PLAN:  The patient is going to remain here overnight and off ambulation, will return tomorrow for the second part of the procedure where we will proceed with the posterior approach with a possible decompression if needed.         MD DMITRI [id number]  D:  05/06/2019 20:21  T:  05/06/2019 22:10  JOB #:  [id number]  CC:  [facility]

## 2019-05-07 NOTE — PERIOP NOTES
1625-Handoff Report from Operating Room to PACU Report received from Lalo Monzon RN and Tish Kaur CRNA regarding Lu Mac. Surgeon(s): 
Isaias Rick MD  And Procedure(s) (LRB): STAGED CASE PART 2 - L2-5 POSTERIOR DECOMPRESSION AND FUSION (N/A)  confirmed  
with allergies, drains and dressings discussed. Anesthesia type, drugs, patient history, complications, estimated blood loss, vital signs, intake and output, and last pain medication, lines, reversal medications and temperature were reviewed. 1640- Pt is swinging her arms around and pulling at lines and her gown. She is fighting nursing staff. Order received from Dr. Juvencio Chapin for restraints. 65- Family updated and notified that the pt is in restraints. Understanding verbalized. Nursing supervisor is aware also. 1745- TRANSFER - OUT REPORT: 
 
Verbal report given to Laya RN(name) on Lu Mac  being transferred to Laya NIETO(unit) for routine post - op Report consisted of patients Situation, Background, Assessment and  
Recommendations(SBAR). Information from the following report(s) SBAR, Kardex, OR Summary, Procedure Summary, Intake/Output, MAR and Recent Results was reviewed with the receiving nurse. Opportunity for questions and clarification was provided. Patient transported with: 
 O2 @ 3 liters Registered Nurse

## 2019-05-07 NOTE — PERIOP NOTES
Handoff Report from Operating Room to PACU Report received from Finn Pittman RN  and Caro Spurling CRNA regarding María Awad. Surgeon(s): 
Lopez Amaya MD  And Procedure(s) (LRB): STAGED CASE PAT 1, L2-5 LATERAL FUSION WITH LEFT ILIAC CREST BONE MARROW GRAFT (N/A)  confirmed  
with allergies, drains and dressings discussed. Anesthesia type, drugs, patient history, complications, estimated blood loss, vital signs, intake and output, and last pain medication and reversal medications were reviewed.

## 2019-05-07 NOTE — BRIEF OP NOTE
BRIEF OPERATIVE NOTE Date of Procedure: 5/7/2019 Preoperative Diagnosis: SPINAL STENOSIS, LUMBAR REGION, ACQUIRED SPONDYLOLISTHESIS, SCIATICA LEFT SIDE, LOW BACK PAIN Postoperative Diagnosis: Spinal Stenosis, Lumbar Region, Acquired Procedure(s): STAGED CASE PART 2 - L2-5 POSTERIOR DECOMPRESSION AND FUSION Surgeon(s) and Role: Sherie An MD - Primary Surgical Assistant: Sebastian Campuzano PAc Surgical Staff: 
Circ-1: Janak Siegel Circ-Relief: Andrew Dooley Physician Assistant: YADI Singh Radiology Technician: Keith Davies; Nedra Zamora Scrub Tech-1: Reanna Trevino Event Time In Time Out Incision Start [phone] Incision Close Anesthesia: General  
Estimated Blood Loss: 200cc Specimens: * No specimens in log * Findings: Stenosis Complications: none Implants:  
Implant Name Type Inv. Item Serial No.  Lot No. LRB No. Used Action 3Demin Cortical Lnajpz50.0cc Bone  [id number] [employer] N/A N/A 1 Implanted SCR SPNE OSCAR THRD [id number] TI -- MATRIX - SN/A  SCR SPNE OSCAR THRD 7X45MM TI -- [employer]X N/A SYNTHES [place] N/A N/A 8 Implanted SCR SPN HEAD THRD POLY 5.5MM -- [employer] - SN/A  SCR SPN HEAD THRD POLY 5.5MM -- [employer] N/A SYNTHES [place] N/A N/A 8 Implanted CAP LCK W/O SADL -- [employer]X - SN/A  CAP LCK W/O SADL -- [employer] N/A SYNTHES [place] N/A N/A 8 Implanted TATIANNA SPNE [id number] RAD/100MM -- TI [employer] MIS - SN/A  TATIANNA SPNE [id number] RAD/100MM -- TI [employer] MIS N/A SYNTHES [place] N/A N/A 1 Implanted TATIANNA SPNE [id number] RAD/95MM -- TI [employer] MIS - SN/A  TATIANNA SPNE [id number] RAD/95MM -- TI [employer] MIS N/A SYNTHES [place] N/A N/A 1 Implanted

## 2019-05-07 NOTE — PROGRESS NOTES
Ortho/ NeuroSurgery NP Note POD# 0  s/p STAGED CASE PART 2 - L2-5 POSTERIOR DECOMPRESSION AND FUSION, POD # 1 L2-5  lateral decompression and fusion Pt seen in PACU, sleeping at this time. Per RN patient was pulling at lines and swinging arms requiring restraints, and was now calm. VSS Afebrile. Patient has not had something to eat/drink. No nausea. Most Recent Labs:  
Lab Results Component Value Date/Time HGB 7.8 (L) 05/07/2019 06:48 AM  
 Hemoglobin A1c 6.2 05/01/2019 07:44 AM  
 
 
Body mass index is 30.83 kg/m². Reference: BMI greater than 30 is classified as obesity and greater than 40 is classified as morbid obesity. STOP BANG Score: 3 Voiding status: needs to void [place] dressing in place FAYE drain in place to suction Unable to assess patient at this time r/t current condition SCDs for mechanical DVT proph Plan: 
1) PT BID starting tomorrow 2) Arlyn-op Antibiotics Vancomycin 3) Pain management 4) Daily aspirin 81 mg for DVT prophylaxis 4) Pepcid for GI Prophylaxis 5) Discharge plans pending therapy and medical readiness. Readiness for discharge: 
   [x] Vital Signs stable  
 [x] Hgb stable  
 [] + Voiding  
 [] Incision intact, drainage minimal  
 [] Tolerating PO intake   
 [] Cleared by PT (OT if applicable) [] Stair training completed (if applicable) [] Independent/Contact Guard ambulation with assistive device (household distance) [] Bed mobility [] Car transfers  
  [] ADLs  
 [] Adequate pain control on oral medication alone Etta Rasheed NP

## 2019-05-07 NOTE — PERIOP NOTES
Assume care of patient. SBAR received from [place] [id number] Patient writhing in bed CRNA in to assess patient and is medicating her with Fentanyl 25 mics. Patient has O2 at 2 l/m. cardiac monitors x 3 on.

## 2019-05-07 NOTE — PROGRESS NOTES
Daughter at bedside. Patient requesting to get up in the chair. X 2 assist , assisted patient in the chair. Back brace in place. Patient calm, speech clear. Patient set up to brush her teeth. Patient able to brush her teeth.

## 2019-05-07 NOTE — ANESTHESIA PREPROCEDURE EVALUATION
Anesthetic History No history of anesthetic complications Review of Systems / Medical History Patient summary reviewed, nursing notes reviewed and pertinent labs reviewed Pulmonary Within defined limits Neuro/Psych Within defined limits Cardiovascular Within defined limits Hypertension CAD and cardiac stents Exercise tolerance: >4 METS 
  
GI/Hepatic/Renal 
Within defined limits GERD Endo/Other Diabetes: well controlled, type 2, using insulin Hypothyroidism Obesity and arthritis Other Findings Comments: Spinal stenosis Hypokalemia Hoarse since last surgery in October Physical Exam 
 
Airway Mallampati: II 
TM Distance: 4 - 6 cm Neck ROM: normal range of motion Mouth opening: Normal 
 
 Cardiovascular Regular rate and rhythm,  S1 and S2 normal,  no murmur, click, rub, or gallop Dental 
 
Dentition: Poor dentition Pulmonary Breath sounds clear to auscultation Abdominal 
GI exam deferred Other Findings Anesthetic Plan ASA: 3 Anesthesia type: general 
 
Monitoring Plan: BIS Induction: Intravenous Anesthetic plan and risks discussed with: Patient 
 
 
hasmukh

## 2019-05-07 NOTE — PERIOP NOTES
Spoke with Eduardo De Paz RN in regards to lab work, requested BMP be drawn due to low K 4/6/2019. States will do. 1250 Pt c/o numbness in right thigh, states uncomfortable, repositioned, turned on right side. Dr Adrienne Stein notified pt's discomfort, states tylenol should be given, informed just given on floor. 1255 Pt resting with eyes closed.

## 2019-05-07 NOTE — PROGRESS NOTES
Problem: Mobility Impaired (Adult and Pediatric) Goal: *Acute Goals and Plan of Care (Insert Text) Description Physical Therapy Goals Initiated 5/7/2019 1. Patient will move from supine to sit and sit to supine  in bed with modified independence within 4 days. 2. Patient will perform sit to stand with modified independence within 4 days. 3. Patient will ambulate with modified independence for 150 feet with the least restrictive device within 4 days. 4. Patient will ascend/descend 5 stairs with 1 handrail(s) with modified independence within 4 days. 5. Patient will verbalize and demonstrate understanding of spinal precautions (No bending, lifting greater than 5 lbs, or twisting; log-roll technique; frequent repositioning as instructed) within 4 days. Outcome: Progressing Towards Goal 
 PHYSICAL THERAPY EVALUATION Patient: Gino Pizarro (11 y.o. female) Date: 5/7/2019 Primary Diagnosis: Cervical stenosis of spinal canal [M48.02] Spinal stenosis of lumbar region at multiple levels [M48.061] Procedure(s) (LRB): STAGED CASE PAT 1, L2-5 LATERAL FUSION WITH LEFT ILIAC CREST BONE MARROW GRAFT (N/A) 1 Day Post-Op Precautions:   Back, Fall, Other (comment)(brace when up) ASSESSMENT : 
Based on the objective data described below, the patient presents with decreased functional mobility from baseline level of function. Prior to admit patient was independent with mobility using a SPC for support. Currently with high pain levels and is very drowsy. Needs CGA for sit to stand from chair and is able to amb approx 40 feet with RW and CGA with no overt LOB but generally unstable secondary to drowsiness and pain. Patient goes for 2nd part of surgery today and will follow up tmrw for mobility progression. Anticipate HH PT will be appropriate but will revisit that after PT session tmrw. Will continue to follow. Patient will benefit from skilled intervention to address the above impairments. Patient?s rehabilitation potential is considered to be Good Factors which may influence rehabilitation potential include:  
? None noted ? Mental ability/status ? Medical condition ? Home/family situation and support systems ? Safety awareness 
? Pain tolerance/management 
? Other: PLAN : 
Recommendations and Planned Interventions: 
?           Bed Mobility Training             ? Neuromuscular Re-Education ? Transfer Training                   ? Orthotic/Prosthetic Training 
? Gait Training                         ? Modalities ? Therapeutic Exercises           ? Edema Management/Control ? Therapeutic Activities            ? Patient and Family Training/Education ? Other (comment): Frequency/Duration: Patient will be followed by physical therapy  twice daily to address goals. Discharge Recommendations: Home Health Further Equipment Recommendations for Discharge: none-owns Rw and SPC SUBJECTIVE:  
Patient stated ? I don't want anymore surgery. ? OBJECTIVE DATA SUMMARY:  
HISTORY:   
Past Medical History:  
Diagnosis Date Arthritis CAD (coronary artery disease) 6 stents (CARMITA) 3/21/18 at [facility] Diabetes ([address] Dizziness Dyspepsia and other specified disorders of function of stomach GERD (gastroesophageal reflux disease) Headache(784.0) Hx of cardiac cath 08/2018  
 after abnormal stress test 3/13/18 Hypertension Loss of appetite Musculoskeletal disorder   
 back pain Other ill-defined conditions(799.89)   
 shingles Thyroid disease   
 hypothyroidism Past Surgical History:  
Procedure Laterality Date CARDIAC SURG PROCEDURE UNLIST    
 6 stents [address]  
 hysterectomy & 2 c-sections HX HEENT  1963  
 tonsils HX ORTHOPAEDIC Bilateral   
 carpal tunnel release HX ORTHOPAEDIC    
 neck surgery HX OTHER SURGICAL  1976  
 sweat glands removed @ [facility]  
 HX OTHER SURGICAL  2015  
 abscess removed from right back [place] DRAIN SKIN ABSCESS SIMPLE  1/9/12 Prior Level of Function/Home Situation: Independent with mobility at baseline. Amb with SPC at times. Has supportive family at home Personal factors and/or comorbidities impacting plan of care:  
 
Home Situation Home Environment: Private residence # Steps to Enter: 5 Rails to Enter: Yes One/Two Story Residence: One story Living Alone: No 
Support Systems: Child(delroy), Spouse/Significant Other/Partner Patient Expects to be Discharged to[de-identified] Private residence Current DME Used/Available at Home: Diane Michelller, quad, Walker, rollator, Walker, rolling Tub or Shower Type: Shower EXAMINATION/PRESENTATION/DECISION MAKING:  
Critical Behavior: 
Neurologic State: Alert Orientation Level: Disoriented to time, Oriented to place, Oriented to situation Cognition: Decreased attention/concentration, Follows commands Range Of Motion: 
AROM: Generally decreased, functional 
  
  
  
  
  
  
  
Strength:   
Strength: Generally decreased, functional 
  
  
  
  
  
  
Tone & Sensation:  
  
  
  
  
  
  
  
  
  
   
Coordination: 
  
Vision:  
  
Functional Mobility: 
Bed Mobility: 
  
  
Sit to Supine: Minimum assistance;Assist x2 Transfers: 
Sit to Stand: Contact guard assistance; Additional time;Assist x2 Stand to Sit: Contact guard assistance;Assist x2 Balance:  
Sitting: Intact Standing: Impaired Standing - Static: Constant support;Good Standing - Dynamic : Constant support; Junella Fix Ambulation/Gait Training: 
Distance (ft): 40 Feet (ft) Assistive Device: Gait belt;Walker, rolling Ambulation - Level of Assistance: Contact guard assistance; Additional time;Assist x1 Gait Abnormalities: Decreased step clearance;Shuffling gait Base of Support: Widened Speed/Jessica: Pace decreased (<100 feet/min); Slow Step Length: Left shortened;Right shortened Functional Measure: 
Barthel Index: 
Bathin Bladder: 0 Bowels: 10 
Groomin Dressin Feeding: 10 Mobility: 10 Stairs: 5 Toilet Use: 5 Transfer (Bed to Chair and Back): 10 Total: 60/100 Percentage of impairment  
0% 1-19% 20-39% 40-59% 60-79% 80-99% 100% Barthel Score 0-100 100 99-80 79-60 59-40 20-39 1-19 
 0 The Barthel ADL Index: Guidelines 1. The index should be used as a record of what a patient does, not as a record of what a patient could do. 2. The main aim is to establish degree of independence from any help, physical or verbal, however minor and for whatever reason. 3. The need for supervision renders the patient not independent. 4. A patient's performance should be established using the best available evidence. Asking the patient, friends/relatives and nurses are the usual sources, but direct observation and common sense are also important. However direct testing is not needed. 5. Usually the patient's performance over the preceding 24-48 hours is important, but occasionally longer periods will be relevant. 6. Middle categories imply that the patient supplies over 50 per cent of the effort. 7. Use of aids to be independent is allowed. Jovita Moran., Barthel, D.W. (4186). Functional evaluation: the Barthel Index. [address] [facility] (14)2. MODESTA ParkerF, [employer]., Flavio Clinton., [place], [address] [place] [address] (). Measuring the change indisability after inpatient rehabilitation; comparison of the responsiveness of the Barthel Index and Functional [place] Measure. Journal of Neurology, Neurosurgery, and Psychiatry, 66(4), 860-417. Krunal Kauffman, N.J.A, Marsha Quintana,  W.J.M, & Carolyne Garcia, M.A. (2004.) Assessment of post-stroke quality of life in cost-effectiveness studies: The usefulness of the Barthel Index and the EuroQoL-5D. [facility], 13, 008-57 Pain: 
Pain Scale 1: Visual 
 Pain Intensity 1: 5 Pain Location 1: Back Pain Intervention(s) 1: Medication (see MAR) Activity Tolerance: VSS Please refer to the flowsheet for vital signs taken during this treatment. After treatment:  
?         Patient left in no apparent distress sitting up in chair ? Patient left in no apparent distress in bed 
? Call bell left within reach ? Nursing notified ? Caregiver present ? Bed alarm activated COMMUNICATION/EDUCATION:  
The patient?s plan of care was discussed with: Physical Therapist, Occupational Therapist and Registered Nurse. ?         Fall prevention education was provided and the patient/caregiver indicated understanding. ? Patient/family have participated as able in goal setting and plan of care. ?         Patient/family agree to work toward stated goals and plan of care. ?         Patient understands intent and goals of therapy, but is neutral about his/her participation. ? Patient is unable to participate in goal setting and plan of care. Thank you for this referral. 
Ketan Mayer, PT, DPT Time Calculation: 18 mins

## 2019-05-07 NOTE — ANESTHESIA POSTPROCEDURE EVALUATION
Procedure(s): STAGED CASE PAT 1, L2-5 LATERAL FUSION WITH LEFT ILIAC CREST BONE MARROW GRAFT. general 
 
Anesthesia Post Evaluation Patient location during evaluation: PACU Note status: Adequate. Level of consciousness: responsive to verbal stimuli and sleepy but conscious Pain management: satisfactory to patient Airway patency: patent Anesthetic complications: no 
Cardiovascular status: acceptable Respiratory status: acceptable Hydration status: acceptable Comments: +Post-Anesthesia Evaluation and Assessment Patient: Simone Lucio MRN: [id number]  SSN: [id number] YOB: 1955  Age: 70 y.o. Sex: female Cardiovascular Function/Vital Signs /80   Pulse (!) 105   Temp (P) 36.4 °C (97.6 °F)   Resp 16   Ht 5' 4.25\" (1.632 m)   Wt 82.1 kg (181 lb)   SpO2 100%   BMI 30.83 kg/m² Patient is status post Procedure(s): STAGED CASE PAT 1, L2-5 LATERAL FUSION WITH LEFT ILIAC CREST BONE MARROW GRAFT. Nausea/Vomiting: Controlled. Postoperative hydration reviewed and adequate. Pain: 
Pain Scale 1: (P) FLACC (05/06/19 2221) Pain Intensity 1: 0 (05/06/19 2043) Managed. Neurological Status:  
Neuro (L): Exceptions to [facility] (05/06/19 2043) At baseline. Mental Status and Level of Consciousness: Arousable. Pulmonary Status:  
O2 Device: Nasal cannula (05/06/19 2043) Adequate oxygenation and airway patent. Complications related to anesthesia: None Post-anesthesia assessment completed. No concerns. Signed By: Jonathon Johns MD  
 5/6/2019 Post anesthesia nausea and vomiting:  controlled Vitals Value Taken Time /86 5/6/2019 10:34 PM  
Temp 36.7 °C (98.1 °F) 5/6/2019  8:43 PM  
Pulse 104 5/6/2019 10:34 PM  
Resp 25 5/6/2019 10:34 PM  
SpO2 96 % 5/6/2019 10:34 PM  
Vitals shown include unvalidated device data.

## 2019-05-07 NOTE — DIABETES MGMT
DTC Post Surgical Education Note Recommendations/comments: Please consider the following for glycemic management:  
1)  Discontinuing (Humalog 75/25) and replacing with following:  NPH 10 units breakfast and 8 units with dinner for basal coverage. 2) starting Lispro 3 units ac tid once po intakes >50% consistently. 3) restart home Metformin day of discharge. Chart reviewed and initial evaluation complete on Ailyn Lopez. Briefly visited with patient and then spent most of the time talking to her daughter,Tamica. Pt shared that she was experiencing hypoglycemia early am and was taking her Amaryl at home with her evening meal along with her mixed NPH/Reguar insulin dose. Eugene Alex shared that she was adjusting her insulin doses as she was having low BG at home and not eating as much as usual due to a poor appetite. Pt shared that she is testing her BG daily - encouraged bid testing post op and to assist with medication management. Current hospital DM medication:  Humalog 75/25 17 units am and 15 units dinner (this is not available for admission- see above for replacements). Lispro normal sensitivity ac and hs. Assessed and instructed patient on the following: 
· interpretation of lab results - pt confirms hypoglycemia with A1c 6.2%. · blood sugar goals - discussed post op targets. · complications of diabetes mellitus - risk for delayed wound healing w/ BG and poor po intakes. · hypoglycemia prevention and treatment - reviewed with daughter, Eugene Alex. · Exercise - encouraged within limits post op. · SMBG skills - increase to bid testing with bid insulin dosing. · nutrition - encouraged use of nutrition supplements as needed if po remains low compared to pre-op. · referred to Diabetes Educator- post op 1 month per daughter's agreement. Provided patient with the following: 
      [x]        Survival skills education materials [x]       Post surgery BG management guidelines              [x]        Outpatient DTC contact number 
              []        Glucometer 
    []        Insulin start kit- vial/syringe 
    []        Insulin start kit- pen Patient is a 70 y.o. female with known Type 2 DM on Metformin 500 mg bid ac, NPH/reg 17 units am and 15 units pm along with Glimepiride dosed at dinner per pt at home. A1c:  
POC Glucose last 24hrs:  
Lab Results Component Value Date/Time Glucose (POC) 181 (H) 05/07/2019 07:26 AM  
 Glucose (POC) 206 (H) 05/06/2019 08:51 PM  
 Glucose (POC) 125 (H) 05/06/2019 03:31 PM  
 Glucose (POC) 110 (H) 05/01/2019 09:18 AM  
 Glucose (POC) 172 (H) 11/01/2018 11:33 AM  
 Glucose (POC) 143 (H) 11/01/2018 07:32 AM  
 Glucose (POC) 202 (H) 10/31/2018 09:25 PM  
 
 
Recent Glucose Results:  
Lab Results Component Value Date/Time GLUCPOC 181 (H) 05/07/2019 07:26 AM  
 GLUCPOC 206 (H) 05/06/2019 08:51 PM  
 GLUCPOC 125 (H) 05/06/2019 03:31 PM  
  
 
Lab Results Component Value Date/Time Creatinine 0.75 05/03/2019 11:15 AM  
 
Estimated Creatinine Clearance: 77.9 mL/min (based on SCr of 0.75 mg/dL). Active Orders Diet DIET NPO  
  
PO intake: No data found. Will continue to follow as needed. Thank you. Duran Mejia RD CDE [facility] Time spent: 30 minutes

## 2019-05-07 NOTE — ANESTHESIA POSTPROCEDURE EVALUATION
Procedure(s): STAGED CASE PART 2 - L2-5 POSTERIOR DECOMPRESSION AND FUSION. general 
 
Anesthesia Post Evaluation Patient location during evaluation: PACU Note status: Adequate. Level of consciousness: responsive to verbal stimuli and sleepy but conscious Pain management: satisfactory to patient Airway patency: patent Anesthetic complications: no 
Cardiovascular status: acceptable Respiratory status: acceptable Hydration status: acceptable Comments: +Post-Anesthesia Evaluation and Assessment Patient: María Awad MRN: [id number]  SSN: [id number] YOB: 1955  Age: 70 y.o. Sex: female Cardiovascular Function/Vital Signs /69   Pulse (!) 106   Temp 37.3 °C (99.1 °F)   Resp 20   Ht 5' 4.25\" (1.632 m)   Wt 82.1 kg (181 lb)   SpO2 95%   BMI 30.83 kg/m² Patient is status post Procedure(s): STAGED CASE PART 2 - L2-5 POSTERIOR DECOMPRESSION AND FUSION. Nausea/Vomiting: Controlled. Postoperative hydration reviewed and adequate. Pain: 
Pain Scale 1: Behavioral Pain Scale (BPS) (05/07/19 1339) Pain Intensity 1: 8 (05/07/19 1336) Managed. Neurological Status:  
Neuro (WDL): Exceptions to WDL (05/07/19 1220) At baseline. Mental Status and Level of Consciousness: Arousable. Pulmonary Status:  
O2 Device: Nasal cannula (05/07/19 1626) Adequate oxygenation and airway patent. Complications related to anesthesia: None Post-anesthesia assessment completed. No concerns. Signed By: Trini Bai MD  
 5/7/2019 Post anesthesia nausea and vomiting:  controlled Vitals Value Taken Time /69 5/7/2019  5:00 PM  
Temp 37.3 °C (99.1 °F) 5/7/2019  4:26 PM  
Pulse 105 5/7/2019  5:05 PM  
Resp 20 5/7/2019  5:05 PM  
SpO2 94 % 5/7/2019  5:05 PM  
Vitals shown include unvalidated device data.

## 2019-05-07 NOTE — PERIOP NOTES
Patient: Josie Louis MRN: [id number]  SSN: [id number] YOB: 1955  Age: 70 y.o. Sex: female Patient is status post Procedure(s): STAGED CASE PART 2 - L2-5 POSTERIOR DECOMPRESSION AND FUSION. Surgeon(s) and Role: Dany Chairez MD - Primary Local/Dose/Irrigation:  100ml surgical pain solution Peripheral IV 05/06/19 Right Wrist (Active) Site Assessment Clean, dry, & intact 5/7/2019 12:49 PM  
Phlebitis Assessment 0 5/7/2019 12:49 PM  
Infiltration Assessment 0 5/7/2019 12:49 PM  
Dressing Status Clean, dry, & intact 5/7/2019 12:49 PM  
Dressing Type Tape;Transparent 5/7/2019 12:49 PM  
Hub Color/Line Status Green; Infusing 5/7/2019 12:49 PM  
Action Taken Blood drawn 5/6/2019  3:27 PM  
      
[place] Drain #1 05/07/19 Back (Active) Airway - Endotracheal Tube 05/07/19 Oral (Active) Dressing/Packing:  Wound Back-Dressing Type: Staples(lila, biopatch, tegaderm) (05/07/19 0800) Wound Back-Dressing Type: Topical skin adhesive/glue(premerePro) (05/07/19 0006) Splint/Cast:  ]

## 2019-05-07 NOTE — PROGRESS NOTES
Bedside and Verbal shift change report given to Patricia RN (oncoming nurse) by Federica Tariq RN (offgoing nurse). Report included the following information SBAR, Kardex, OR Summary, Procedure Summary, Intake/Output, MAR and Recent Results.

## 2019-05-07 NOTE — PERIOP NOTES
TRANSFER - OUT REPORT: 
 
Verbal report given to Lina Devries RN(name) on Rupal Brandt  being transferred to [facility](unit) for routine post - op Report consisted of patients Situation, Background, Assessment and  
Recommendations(SBAR). Information from the following report(s) SBAR, Kardex, OR Summary, Intake/Output, MAR and Recent Results was reviewed with the receiving nurse. Opportunity for questions and clarification was provided. Patient transported with: 
 Monitor O2 @ 3 liters Registered Nurses Patient

## 2019-05-07 NOTE — PERIOP NOTES
TRANSFER - IN REPORT: 
 
Verbal report received from Vicki Churchill RN on Tish Watson  being received from [phone] for ordered procedure Report consisted of patients Situation, Background, Assessment and  
Recommendations(SBAR). Information from the following report(s) SBAR, Kardex, Intake/Output, MAR and Recent Results was reviewed with the receiving nurse. Opportunity for questions and clarification was provided. Assessment completed upon patients arrival to unit and care assumed.

## 2019-05-08 ENCOUNTER — APPOINTMENT (OUTPATIENT)
Dept: GENERAL RADIOLOGY | Age: 64
DRG: 455 | End: 2019-05-08
Attending: ORTHOPAEDIC SURGERY
Payer: MEDICARE

## 2019-05-08 LAB
GLUCOSE BLD STRIP.AUTO-MCNC: 106 MG/DL (ref 65–100)
GLUCOSE BLD STRIP.AUTO-MCNC: 172 MG/DL (ref 65–100)
GLUCOSE BLD STRIP.AUTO-MCNC: 173 MG/DL (ref 65–100)
GLUCOSE BLD STRIP.AUTO-MCNC: 180 MG/DL (ref 65–100)
HGB BLD-MCNC: 6.2 G/DL (ref 11.5–16)
SERVICE CMNT-IMP: ABNORMAL

## 2019-05-08 PROCEDURE — 77010033678 HC OXYGEN DAILY

## 2019-05-08 PROCEDURE — 97535 SELF CARE MNGMENT TRAINING: CPT

## 2019-05-08 PROCEDURE — 97116 GAIT TRAINING THERAPY: CPT

## 2019-05-08 PROCEDURE — 74011636637 HC RX REV CODE- 636/637: Performed by: ORTHOPAEDIC SURGERY

## 2019-05-08 PROCEDURE — 82962 GLUCOSE BLOOD TEST: CPT

## 2019-05-08 PROCEDURE — 51798 US URINE CAPACITY MEASURE: CPT

## 2019-05-08 PROCEDURE — 36415 COLL VENOUS BLD VENIPUNCTURE: CPT

## 2019-05-08 PROCEDURE — 97168 OT RE-EVAL EST PLAN CARE: CPT

## 2019-05-08 PROCEDURE — 74011250636 HC RX REV CODE- 250/636: Performed by: ORTHOPAEDIC SURGERY

## 2019-05-08 PROCEDURE — 74011250637 HC RX REV CODE- 250/637: Performed by: ORTHOPAEDIC SURGERY

## 2019-05-08 PROCEDURE — 65270000029 HC RM PRIVATE

## 2019-05-08 PROCEDURE — 94760 N-INVAS EAR/PLS OXIMETRY 1: CPT

## 2019-05-08 PROCEDURE — 30233N1 TRANSFUSION OF NONAUTOLOGOUS RED BLOOD CELLS INTO PERIPHERAL VEIN, PERCUTANEOUS APPROACH: ICD-10-PCS | Performed by: ORTHOPAEDIC SURGERY

## 2019-05-08 PROCEDURE — 77030036660

## 2019-05-08 PROCEDURE — 36430 TRANSFUSION BLD/BLD COMPNT: CPT

## 2019-05-08 PROCEDURE — 97164 PT RE-EVAL EST PLAN CARE: CPT

## 2019-05-08 PROCEDURE — P9016 RBC LEUKOCYTES REDUCED: HCPCS

## 2019-05-08 PROCEDURE — 85018 HEMOGLOBIN: CPT

## 2019-05-08 RX ORDER — GLIMEPIRIDE 1 MG/1
4 TABLET ORAL
Status: DISCONTINUED | OUTPATIENT
Start: 2019-05-08 | End: 2019-05-11 | Stop reason: HOSPADM

## 2019-05-08 RX ORDER — SODIUM CHLORIDE 9 MG/ML
250 INJECTION, SOLUTION INTRAVENOUS AS NEEDED
Status: DISCONTINUED | OUTPATIENT
Start: 2019-05-08 | End: 2019-05-11 | Stop reason: HOSPADM

## 2019-05-08 RX ORDER — LEVOTHYROXINE SODIUM 112 UG/1
112 TABLET ORAL
Status: DISCONTINUED | OUTPATIENT
Start: 2019-05-09 | End: 2019-05-11 | Stop reason: HOSPADM

## 2019-05-08 RX ADMIN — OXYCODONE HYDROCHLORIDE 10 MG: 5 TABLET ORAL at 00:48

## 2019-05-08 RX ADMIN — GABAPENTIN 100 MG: 100 CAPSULE ORAL at 22:46

## 2019-05-08 RX ADMIN — Medication 10 ML: at 23:45

## 2019-05-08 RX ADMIN — METFORMIN HYDROCHLORIDE 500 MG: 500 TABLET ORAL at 17:29

## 2019-05-08 RX ADMIN — SENNOSIDES AND DOCUSATE SODIUM 1 TABLET: 8.6; 5 TABLET ORAL at 08:40

## 2019-05-08 RX ADMIN — HYDROMORPHONE HYDROCHLORIDE 1 MG: 1 INJECTION, SOLUTION INTRAMUSCULAR; INTRAVENOUS; SUBCUTANEOUS at 04:55

## 2019-05-08 RX ADMIN — POLYETHYLENE GLYCOL 3350 17 G: 17 POWDER, FOR SOLUTION ORAL at 08:39

## 2019-05-08 RX ADMIN — OXYCODONE HYDROCHLORIDE 5 MG: 5 TABLET ORAL at 17:29

## 2019-05-08 RX ADMIN — CARVEDILOL 12.5 MG: 12.5 TABLET, FILM COATED ORAL at 17:30

## 2019-05-08 RX ADMIN — ROSUVASTATIN CALCIUM 20 MG: 10 TABLET, COATED ORAL at 23:41

## 2019-05-08 RX ADMIN — INSULIN LISPRO 3 UNITS: 100 INJECTION, SOLUTION INTRAVENOUS; SUBCUTANEOUS at 17:28

## 2019-05-08 RX ADMIN — ACETAMINOPHEN 1000 MG: 500 TABLET ORAL at 00:48

## 2019-05-08 RX ADMIN — INSULIN LISPRO 2 UNITS: 100 INJECTION, SOLUTION INTRAVENOUS; SUBCUTANEOUS at 17:28

## 2019-05-08 RX ADMIN — INSULIN LISPRO 2 UNITS: 100 INJECTION, SOLUTION INTRAVENOUS; SUBCUTANEOUS at 08:37

## 2019-05-08 RX ADMIN — SODIUM CHLORIDE 125 ML/HR: 900 INJECTION, SOLUTION INTRAVENOUS at 04:58

## 2019-05-08 RX ADMIN — INSULIN LISPRO 2 UNITS: 100 INJECTION, SOLUTION INTRAVENOUS; SUBCUTANEOUS at 11:52

## 2019-05-08 RX ADMIN — ACETAMINOPHEN 1000 MG: 500 TABLET ORAL at 11:51

## 2019-05-08 RX ADMIN — RANITIDINE 300 MG: 15 SYRUP ORAL at 08:39

## 2019-05-08 RX ADMIN — LEVOTHYROXINE SODIUM 112 MCG: 112 TABLET ORAL at 06:33

## 2019-05-08 RX ADMIN — DIAZEPAM 5 MG: 5 TABLET ORAL at 22:46

## 2019-05-08 RX ADMIN — OXYCODONE HYDROCHLORIDE 15 MG: 5 TABLET ORAL at 23:41

## 2019-05-08 RX ADMIN — OXYCODONE HYDROCHLORIDE 10 MG: 5 TABLET ORAL at 20:26

## 2019-05-08 RX ADMIN — DULOXETINE HYDROCHLORIDE 60 MG: 30 CAPSULE, DELAYED RELEASE ORAL at 08:40

## 2019-05-08 RX ADMIN — OXYCODONE HYDROCHLORIDE 5 MG: 5 TABLET ORAL at 13:29

## 2019-05-08 RX ADMIN — POTASSIUM CHLORIDE 10 MEQ: 750 TABLET, EXTENDED RELEASE ORAL at 17:30

## 2019-05-08 RX ADMIN — POTASSIUM CHLORIDE 10 MEQ: 750 TABLET, EXTENDED RELEASE ORAL at 08:40

## 2019-05-08 RX ADMIN — Medication 10 ML: at 07:50

## 2019-05-08 RX ADMIN — SODIUM CHLORIDE 125 ML/HR: 900 INJECTION, SOLUTION INTRAVENOUS at 17:31

## 2019-05-08 RX ADMIN — Medication 10 ML: at 13:29

## 2019-05-08 RX ADMIN — FAMOTIDINE 20 MG: 20 TABLET ORAL at 08:40

## 2019-05-08 RX ADMIN — GABAPENTIN 100 MG: 100 CAPSULE ORAL at 17:30

## 2019-05-08 RX ADMIN — INSULIN LISPRO 3 UNITS: 100 INJECTION, SOLUTION INTRAVENOUS; SUBCUTANEOUS at 08:36

## 2019-05-08 RX ADMIN — GLIMEPIRIDE 4 MG: 1 TABLET ORAL at 08:40

## 2019-05-08 RX ADMIN — METFORMIN HYDROCHLORIDE 500 MG: 500 TABLET ORAL at 08:40

## 2019-05-08 RX ADMIN — ACETAMINOPHEN 1000 MG: 500 TABLET ORAL at 23:41

## 2019-05-08 RX ADMIN — FAMOTIDINE 20 MG: 20 TABLET ORAL at 17:30

## 2019-05-08 RX ADMIN — CLOPIDOGREL BISULFATE 75 MG: 75 TABLET ORAL at 08:40

## 2019-05-08 RX ADMIN — SENNOSIDES AND DOCUSATE SODIUM 1 TABLET: 8.6; 5 TABLET ORAL at 17:30

## 2019-05-08 RX ADMIN — ACETAMINOPHEN 1000 MG: 500 TABLET ORAL at 06:33

## 2019-05-08 RX ADMIN — INSULIN LISPRO 3 UNITS: 100 INJECTION, SOLUTION INTRAVENOUS; SUBCUTANEOUS at 11:51

## 2019-05-08 RX ADMIN — ASPIRIN 81 MG: 81 TABLET, COATED ORAL at 08:40

## 2019-05-08 RX ADMIN — INSULIN GLARGINE 18 UNITS: 100 INJECTION, SOLUTION SUBCUTANEOUS at 22:47

## 2019-05-08 RX ADMIN — GABAPENTIN 100 MG: 100 CAPSULE ORAL at 08:40

## 2019-05-08 RX ADMIN — POTASSIUM CHLORIDE 10 MEQ: 750 TABLET, EXTENDED RELEASE ORAL at 22:46

## 2019-05-08 NOTE — PROGRESS NOTES
Ortho / Neurosurgery NP Note POD# 1  s/p STAGED CASE PART 2 - L2-5 POSTERIOR DECOMPRESSION AND FUSION, POD# 2 L2-5 Lateral decompression and fusion Pt resting in bed, alert and oriented. States that pain is 5/10but is tolerable, otherwise no complaints. .   
 
VSS Afebrile. Voiding status: Requiring I/O catheterization this am- needs to void Labs Lab Results Component Value Date/Time HGB 6.2 (L) 05/08/2019 04:59 AM  
  
Lab Results Component Value Date/Time INR 1.1 05/01/2019 07:44 AM  
  
 
Body mass index is 30.83 kg/m². : A BMI > 30 is classified as obesity and > 40 is classified as morbid obesity. Posterior [place] Dressing c.d.i Left lateral dressing c.d.i Drain in place to suction Calves soft and supple; No pain with passive stretch Sensation and motor intact SCDs for mechanical DVT proph while in bed PLAN: 
1) PT BID 
2) Aspirin 81 mg PO BID for DVT Prophylaxis 3) GI Prophylaxis - Pepcid 4) Hypokalemia preadmission- potassium PO ordered- f/u K+ 5) Readniess for discharge: 
   [] Vital Signs stable- hypotensive 
 [] Hgb stable- 6.2 this morning- receiving 1 U PRBC- will recheck HGB in morning 
 [] + Voiding- needs to void  
 [x] Wound intact, drainage minimal  
 [x] Tolerating PO intake   
 [] Cleared by PT (OT if applicable) [] Stair training completed (if applicable) [] Independent / Contact Guard Assist (household distance) [] Bed mobility [] Car transfers  
  [] ADLs [x] Adequate pain control on oral medication alone Plan for discharge pending medical readiness and therapy Stevan Hanna, NP 
DNP, AGACNP-BC

## 2019-05-08 NOTE — CDMP QUERY
Pt admitted with L2-5 POSTERIOR DECOMPRESSION AND FUSION. Pt noted to have hgb 6.2. If possible, please document in the progress notes and d/c summary if you are evaluating and / or treating any of the following: ? Anemia due to acute blood loss ? Anemia due to chronic blood loss ? Anemia due to iron deficiency ? Anemia due to postoperative blood loss (please specify if expected or a complication of the surgery) ? Anemia due to chronic disease, please specify disease ? Dilutional anemia 
? Other, please specify ? Clinically unable to determine The medical record reflects the following: 
   Risk Factors: ebl 200 Clinical Indicators: hgb 6.2 ( 9.5) Treatment: 1u prbc, recheck hgb Thanks, Travon Pendleton RN/CDMP

## 2019-05-08 NOTE — PROGRESS NOTES
Problem: Mobility Impaired (Adult and Pediatric) Goal: *Acute Goals and Plan of Care (Insert Text) Description Physical Therapy Goals Initiated 5/7/2019 - remain appropriate at re-eval 
 
1. Patient will move from supine to sit and sit to supine  in bed with modified independence within 4 days. 2. Patient will perform sit to stand with modified independence within 4 days. 3. Patient will ambulate with modified independence for 150 feet with the least restrictive device within 4 days. 4. Patient will ascend/descend 5 stairs with 1 handrail(s) with modified independence within 4 days. 5. Patient will verbalize and demonstrate understanding of spinal precautions (No bending, lifting greater than 5 lbs, or twisting; log-roll technique; frequent repositioning as instructed) within 4 days. 5/8/2019 1358 by Donna Thompson PT Outcome: Progressing Towards Goal 
 PHYSICAL THERAPY REEVALUATION Patient: Austyn James (1 y.o. female) Date: 5/8/2019 Primary Diagnosis: Cervical stenosis of spinal canal [M48.02] Spinal stenosis of lumbar region at multiple levels [M48.061] Procedure(s) (LRB): STAGED CASE PART 2 - L2-5 POSTERIOR DECOMPRESSION AND FUSION (N/A) 1 Day Post-Op Precautions:   Spinal 
Chart, physical therapy assessment, plan of care and goals were reviewed. ASSESSMENT : 
Based on the objective data described below, the patient presents with increased pain levels, decreased endurance/activity tolerance, BLE weakness, impaired balance, and overall impaired functional mobility. Pt received seated EOB, agreeable to participation with therapy and cleared for participation following blood transfusion. Vital signs assessed throughout with BP remaining stable with position changes/activity. However, HR elevated to 115 bpm with activity.  Secondary to increased pain levels, pt only able to tolerate brief ambulation trial of 15ft w/ RW and minAx1 before fatigue. Gait speed significantly decreased however steady overall with RW support. All mobility occurred on RA with O2 sats 96-98% post activity. Anticipate that pt will continue to progress well with therapy and be appropriate to return home w/ assist of family. Pt will need additional skilled therapy in the acute care setting to further progress safe functional mobility as well as assess safety during stair climbing. Patient will benefit from skilled intervention to address the above impairments. Patient?s rehabilitation potential is considered to be Good Factors which may influence rehabilitation potential include:  
? None noted ? Mental ability/status ? Medical condition ? Home/family situation and support systems ? Safety awareness 
? Pain tolerance/management 
? Other: PLAN : 
Recommendations and Planned Interventions: ?             Bed Mobility Training             ? Neuromuscular Re-Education ? Transfer Training                   ? Orthotic/Prosthetic Training ? Gait Training                         ? Modalities ? Therapeutic Exercises           ? Edema Management/Control ? Therapeutic Activities            ? Patient and Family Training/Education ? Other (comment): stair climbing Frequency/Duration: Patient will be followed by physical therapy twice daily to address goals. Discharge Recommendations: None Further Equipment Recommendations for Discharge: Owns necessary equipment SUBJECTIVE:  
Patient stated ? I met you in October and was so mean to you, I'm sorry. ? OBJECTIVE DATA SUMMARY:  
 
Past Medical History:  
Diagnosis Date Arthritis CAD (coronary artery disease) 6 stents ([facility]) 3/21/18 at [address] Diabetes ([facility] [address] Dizziness Dyspepsia and other specified disorders of function of stomach GERD (gastroesophageal reflux disease) Headache(784.0) Hx of cardiac cath 08/2018  
 after abnormal stress test 3/13/18 Hypertension Loss of appetite Musculoskeletal disorder   
 back pain Other ill-defined conditions(799.89)   
 shingles Thyroid disease   
 hypothyroidism Past Surgical History:  
Procedure Laterality Date CARDIAC SURG PROCEDURE UNLIST    
 6 stents [address]  
 hysterectomy & 2 c-sections HX HEENT  1963  
 tonsils HX ORTHOPAEDIC Bilateral   
 carpal tunnel release HX ORTHOPAEDIC    
 neck surgery HX OTHER SURGICAL  1976  
 sweat glands removed @ [facility]  
 HX OTHER SURGICAL  2015  
 abscess removed from right back MI DRAIN SKIN ABSCESS SIMPLE  1/9/12 Hospital course since last seen and reason for reevaluation: Pt s/p 2nd part of staged sx. Critical Behavior: 
Neurologic State: Alert Orientation Level: Oriented X4 Cognition: Follows commands Safety/Judgement: Fall prevention Skin:  dressing clean, dry, intact Strength:   
Strength: Generally decreased, functional 
  
  
  
  
  
 
  
Tone & Sensation:  
Tone: Normal 
  
  
  
  
Sensation: Intact Range Of Motion: 
AROM: Generally decreased, functional 
  
  
  
  
  
  
  
Coordination: 
Coordination: Within functional limits Functional Mobility: 
Bed Mobility: 
Rolling: Other (comment)(seated EOB upon arrival ) Transfers: 
Sit to Stand: Contact guard assistance Stand to Sit: Contact guard assistance Bed to Chair: Minimum assistance Balance:  
Sitting: Intact Standing: Impaired; With support Standing - Static: Good;Constant support Standing - Dynamic : Fair Ambulation/Gait Training: 
Distance (ft): 15 Feet (ft) Assistive Device: Walker, rolling;Gait belt;Brace/Splint Ambulation - Level of Assistance: Minimal assistance Gait Abnormalities: Decreased step clearance;Shuffling gait Base of Support: Widened Speed/Jessica: Pace decreased (<100 feet/min); Shuffled Step Length: Left shortened;Right shortened Functional Measure: 
Barthel Index: 
Bathin Bladder: 0 Bowels: 10 
Groomin Dressin Feeding: 10 Mobility: 0 Stairs: 0 Toilet Use: 5 Transfer (Bed to Chair and Back): 10 Total: 45/100 Percentage of impairment  
0% 1-19% 20-39% 40-59% 60-79% 80-99% 100% Barthel Score 0-100 100 99-80 79-60 59-40 20-39 1-19 
 0 The Barthel ADL Index: Guidelines 1. The index should be used as a record of what a patient does, not as a record of what a patient could do. 2. The main aim is to establish degree of independence from any help, physical or verbal, however minor and for whatever reason. 3. The need for supervision renders the patient not independent. 4. A patient's performance should be established using the best available evidence. Asking the patient, friends/relatives and nurses are the usual sources, but direct observation and common sense are also important. However direct testing is not needed. 5. Usually the patient's performance over the preceding 24-48 hours is important, but occasionally longer periods will be relevant. 6. Middle categories imply that the patient supplies over 50 per cent of the effort. 7. Use of aids to be independent is allowed. Sharon Tapia., Barthel, D.W. (8997). Functional evaluation: the Barthel Index. [address] [facility] (14)2. Vicky Antunez maria a Lanny, SUSAN.J.M.F, Jero Saldana., Jabier Hart., Delia Harada, [address] [place] [address] (). Measuring the change indisability after inpatient rehabilitation; comparison of the responsiveness of the Barthel Index and Functional [place] Measure. Journal of Neurology, Neurosurgery, and Psychiatry, 66(4), 383-827.  
Juan Bishop, N.J.A, Marsha Quintana,  W.J.M, & Apurva Ovalles MRaquelA. (2004.) Assessment of post-stroke quality of life in cost-effectiveness studies: The usefulness of the Barthel Index and the EuroQoL-5D. [facility], 13, [phone] Pain: 
Pain Scale 1: Numeric (0 - 10) Pain Intensity 1: 6 Pain Location 1: Back Pain Orientation 1: Lower Pain Description 1: Aching Pain Intervention(s) 1: Medication (see MAR) Activity Tolerance:  
HR elevated to 115 bpm with activity, all other VSS on RA Please refer to the flowsheet for vital signs taken during this treatment. After treatment:  
?  Patient left in no apparent distress sitting up in chair ? Patient left in no apparent distress in bed 
? Call bell left within reach ? Nursing notified ? Caregiver present ? Bed alarm activated COMMUNICATION/EDUCATION:  
The patient?s plan of care was discussed with: Occupational Therapist and Registered Nurse. ?  Fall prevention education was provided and the patient/caregiver indicated understanding. ? Patient/family have participated as able in goal setting and plan of care. ?  Patient/family agree to work toward stated goals and plan of care. ?  Patient understands intent and goals of therapy, but is neutral about his/her participation. ? Patient is unable to participate in goal setting and plan of care. Thank you for this referral. 
Nataliia Ventura, PT, DPT Time Calculation: 23 mins

## 2019-05-08 NOTE — PROGRESS NOTES
Bedside shift change report given to GERSON Ashton (oncoming nurse) by Yanna Lewis (offgoing nurse). Report included the following information SBAR, Kardex, Procedure Summary, Intake/Output, MAR and Recent Results.

## 2019-05-08 NOTE — PROGRESS NOTES
Chart reviewed. Noted pt with hgb of 6.2 as well as hypotension and plans to receive 1u PRBCs. Will defer PT re-evaluation at this time however continue to follow.  
 
Lucius Range, PT, DPT

## 2019-05-08 NOTE — PROGRESS NOTES
Bedside shift change report given to Oumar HILL RN (oncoming nurse) by Miriam Leon RN (offgoing nurse). Report included the following information SBAR, Kardex, OR Summary, Intake/Output, MAR and Recent Results. [No Acute Distress] : no acute distress [Well Nourished] : well nourished [Normal Sclera/Conjunctiva] : normal sclera/conjunctiva [PERRL] : pupils equal round and reactive to light [EOMI] : extraocular movements intact [No JVD] : no jugular venous distention [Supple] : supple [No Respiratory Distress] : no respiratory distress  [Clear to Auscultation] : lungs were clear to auscultation bilaterally [No Accessory Muscle Use] : no accessory muscle use [Normal Rate] : normal rate  [Regular Rhythm] : with a regular rhythm [Normal S1, S2] : normal S1 and S2 [No Murmur] : no murmur heard [No Carotid Bruits] : no carotid bruits [Pedal Pulses Present] : the pedal pulses are present [No Edema] : there was no peripheral edema [Soft] : abdomen soft [Non Tender] : non-tender [Non-distended] : non-distended [No HSM] : no HSM [Normal Supraclavicular Nodes] : no supraclavicular lymphadenopathy [Normal Posterior Cervical Nodes] : no posterior cervical lymphadenopathy [Normal Anterior Cervical Nodes] : no anterior cervical lymphadenopathy [No CVA Tenderness] : no CVA  tenderness [No Spinal Tenderness] : no spinal tenderness [No Joint Swelling] : no joint swelling [Grossly Normal Strength/Tone] : grossly normal strength/tone [No Rash] : no rash [Normal Gait] : normal gait [Coordination Grossly Intact] : coordination grossly intact [No Focal Deficits] : no focal deficits [Normal Affect] : the affect was normal [Normal Insight/Judgement] : insight and judgment were intact

## 2019-05-08 NOTE — PROGRESS NOTES
Reason for Admission: Cervical stenosis of spinal canal, spinal stenosis of lumbar region at multiple levels RRAT Score: 25 Resources/supports as identified by patient/family: strong family support Top Challenges facing patient (as identified by patient/family and CM): Finances/Medication cost?  Patient uses [address] in [place] on [address] Transportation? Patient independent with driving prior to admission but has a lot of family to assist with transport needs if and when needed Support system or lack thereof? Strong family support Living arrangements? Patient, spouse, Cameron Collins, and granddaughter live in single level home with 4 entry steps Self-care/ADLs/Cognition? ADLs (very painful, unable to do tasks all at once, had to take breaks) IADLs (painful), driving (family to assist when needed) Current Advanced Directive/Advance Care Plan: not at this time Plan for utilizing home health: [facility] Likelihood of readmission: moderate Transition of Care Plan:               
CM made room visit with patient who was alert and oriented. Pt confirmed demographics, emergency contact, insurance, and PCP (last seen 4/29). Patient stated that she [place] on a road that is full of family members that can assist. Patient has DME including cane, RW, or rollator. Patient denies any history of HH, SNF, or IPR. Patient would like to go home with home health and stated that she has received a phone call from [facility] prior to admission. Patient stated she would like use [facility] services. FOC signed and on bedside chart. CM has sent referral to [facility] and waiting for response. Care Management Interventions PCP Verified by CM: Yes Last Visit to PCP: 04/29/19 Mode of Transport at Discharge: Other (see comment) Transition of Care Consult (CM Consult): Discharge Planning, Home Health Physical Therapy Consult: Yes Occupational Therapy Consult: Yes Speech Therapy Consult: No 
Current Support Network: Lives with Spouse, Own Home, Other, Family Lives Nearby(Patient, spouse, daugher, and granddaughter live in single level home with 4 entry steps ) Confirm Follow Up Transport: Family Plan discussed with Pt/Family/Caregiver: Yes Teddy Shaikh, [address]

## 2019-05-08 NOTE — PROGRESS NOTES
Orthopedic End of Shift Note Bedside and Verbal shift change report given to [facility], RN (oncoming nurse) by Mahin Hartman RN (offgoing nurse). Report included the following information SBAR, Kardex and MAR. POD# Significant issues during shift: none Issues for Physician to address: none Activity This Shift 
(check all that apply) [] chair 
[] dangle 
 [] bathroom 
[] bedside commode [] hallway [x] bedrest  
Nausea/Vomiting [x] yes [] no    
Voiding Status [] void [x] Lane (taken out at 1415) [] I&O Cath Bowel Movements [] yes [x] no Foot Pumps or SCD [x] yes [] no Ice Pack [] yes    [x] no Incentive Spirometer [x] yes [] no Volume:     
Telemetry Monitoring   [] yes [x] no Rhythm:  
Supplemental O2 [x] yes [] no Sat off O2:

## 2019-05-08 NOTE — PROGRESS NOTES
ORTHO POST OP SPINE PROGRESS NOTE May 8, 2019 Admit Date: 2019 Admit Diagnosis: Cervical stenosis of spinal canal [M48.02] Spinal stenosis of lumbar region at multiple levels [M48.061] Procedure: Procedure(s): STAGED CASE PART 2 - L2-5 POSTERIOR DECOMPRESSION AND FUSION Post Op day: 1 Day Post-Op Subjective:  
 
Sonu Garcia is a patient who has complaints Of pain in the low back. She admits to weakness and numbness in the right leg status post L2 through L5 lateral and posterior fusion done in a staged fashion. Postop day 1 following posterior lumbar decompression with L2 through L5 fusion.  at bedside. She states she has not yet been able to void. Denies nausea or vomiting. Review of Systems: Pertinent items are noted in HPI. Objective:  
 
PT/OT:  
Distance Ambulated:          
Time Ambulated (min):       
Ambulation Response: Activity Response: Fairly tolerated Assistive Device:              Assistive Device: Fall prevention device, Walker (comment) Vital Signs:   
Blood pressure 97/65, pulse 100, temperature 98 °F (36.7 °C), resp. rate 18, height 5' 4.25\" (1.632 m), weight 82.1 kg (181 lb), SpO2 92 %. Temp (24hrs), Av.4 °F (36.9 °C), Min:97.8 °F (36.6 °C), Max:99.1 °F (37.3 °C) No intake/output data recorded.  1901 -  0700 In: 1750 [I.V.:1750] Out: 1365 [Urine:965] LAB:   
Recent Labs 19 
0459 HGB 6.2* Wound/Drain Assessment: 
Drain:   
 
Dressing:  
 
Physical Exam: 
Neurological: no deficit Incision clean, dry, and intact 5/5 strength bilateral lower extremities Assessment:  
  
Patient Active Problem List  
Diagnosis Code  LBP (low back pain) M54.5  DM (diabetes mellitus) ([facility] E11.9  Hypercholesterolemia E78.00  
 Diabetes type 2, uncontrolled ([facility] E11.65  Diabetic autonomic neuropathy associated with type 2 diabetes mellitus ([facility] E11.43  
 Hypovitaminosis D E55.9  Leg pain, bilateral M79.604, M79.605  Essential hypertension I10  
 Hypothyroidism E03.9  Cervical stenosis of spinal canal M48.02  
 Cervical stenosis of spine M48.02  
 CAD (coronary artery disease) I25.10  
 S/P lumbar spinal fusion Z98.1  Spinal stenosis of lumbar region at multiple levels M48.061 Plan:  
 
Continue PT/OT/Rehab Discontinue: IV and drain Lumbar Consult: PT  and OT Discharge To: Home versus rehab pending progress Consider Flomax

## 2019-05-08 NOTE — PROGRESS NOTES
1805: Patient returned to unit with soft bilateral wrist restraints. Patient is drowsy but easily aroused. Agitation still present. 1848: Patient has increasing awareness and is not combative. Bilateral soft wrist restraints removed and patient is tolerating PO medications well. Will report to night shift that patient's restraints have been removed.

## 2019-05-08 NOTE — DIABETES MGMT
DTC Progress Note Recommendations/ Comments:chart reviewed for hyperglycemia. If appropriate, please consider: 
1. Increase Lantus to 20 units if fasting BG > 180 mg/dL Current hospital DM medication: Lantus 18 units, prandial 3 units , lispro correction - normal sensitivity Chart reviewed on Deborah Schumacher. Patient is a 70 y.o. female with known DM on metformin 1000 mg BID, Novolin 70/30 17 units am , 15 units pm, glimepiride 4 mg at home. A1c:  
Lab Results Component Value Date/Time Hemoglobin A1c 6.2 05/01/2019 07:44 AM  
 Hemoglobin A1c 7.1 (H) 10/16/2018 10:25 AM  
 
 
Recent Glucose Results:  
Lab Results Component Value Date/Time GLUCPOC 180 (H) 05/08/2019 11:41 AM  
 GLUCPOC 173 (H) 05/08/2019 07:34 AM  
 GLUCPOC 192 (H) 05/07/2019 09:23 PM  
  
 
Lab Results Component Value Date/Time Creatinine 0.75 05/03/2019 11:15 AM  
 
Estimated Creatinine Clearance: 77.9 mL/min (based on SCr of 0.75 mg/dL). Active Orders Diet DIET DIABETIC WITH OPTIONS Consistent Carb 2400kcal; Regular PO intake: No data found. Will continue to follow as needed. Thank you [facility] Office: [phone] Time spent: 2 minutes

## 2019-05-08 NOTE — OP NOTES
[address]  OPERATIVE REPORT    Name:  Barak Doyle  MR#:  [id number]  :  1955  ACCOUNT #:  [de-identified]  DATE OF SERVICE:      PREOPERATIVE DIAGNOSES:  1. Spinal stenosis with claudication. 2.  Lumbago. 3.  Sciatica. POSTOPERATIVE DIAGNOSES:  1. Spinal stenosis with claudication. 2.  Lumbago. 3.  Sciatica. PROCEDURES PERFORMED:  1. An L2 through 5 posterior fusion. 2.  An L2 through 5 posterior instrumentation. 3. An L2 through 4 laminectomy, facetectomy, foraminotomy for purpose of neuro decompression. SURGEON:  Eran Mcclain MD    FIRST ASSISTANT:  Bridgett Sheets. Grace Davis    ANESTHESIA:  General.    DRAINS:  x1.    COMPLICATIONS:  None. SPECIMENS REMOVED:  None. IMPLANTS:  [employer] Synthes Matrix pedicle screw system. ESTIMATED BLOOD LOSS:  200 mL. INDICATIONS FOR THE PROCEDURE:  The patient is a pleasant 40-year-old lady. She underwent an L2 through 5 lateral fusion yesterday and after ambulating today, returns for the posterior part of the procedure. She has been complaining about right anterior thigh and knee pain despite the approach had been done from the left side indicating likely an L2-3 or 3-4 radiculopathy. We have discussed this in detail with her and will proceed with it today. NARRATIVE OF THE PROCEDURE:  After informed consent was obtained and the operative site was properly marked, the patient was moved back to the operating room and underwent general endotracheal anesthesia. She was positioned prone on the operating room table using [employer] frame. Her arms were placed in a 90:90 position. The knees were gently bent with pillows. Fluoroscopy was used to tate the level of the incision. We then proceeded to prep and drape in the usual manner.   Time-out was obtained, verifying that this was the correct patient, the correct surgery, the correct site, as well as that she had received IV antibiotics within 30 minutes of the incision. I then proceeded to perform a standard posterior approach to the lumbar spine exposing the area from L2 through L5. Once the area was exposed and hemostasis was obtained, fluoroscopy was used to verify that we were in the correct level. I then proceeded to begin placing pedicle screws with fluoroscopy under AP view using the awl-tap to tate the entry hole for the pedicle at 3 o'clock and 9 o'clock position. Once the awl-tap was entered and advanced, until it hit the medial end of the pedicle, it was stimulated with electrocautery, removed and the premeasured screw was inserted. This was done bilaterally at L2, L3, L4, and L5. Once all the screws were in place, I then turned my attention to the decompression. I proceeded to go in on the right side, performed an U-cut laminectomy at L2 and a J-cut at L3. I removed the intervening bone with a pituitary and saved it for local autograft bone. I detached ligamentum flavum from superior leading edge, flipped it into the bony defect and then removed it with a Kerrison #3, followed by a Kerrison #4. I was able to decompress the thecal sac as well as the nerve roots on the lateral recess and neuroforamen. Once this was completed at L2-3, I repeated the procedure at L3-4. With both levels completed, I irrigated the wound with normal saline. I decorticated the posterior elements bilaterally, placed the local autograft bone with the cortical bone fibers after using the Jamshidi needle through a separate fascial incision and aspirated 6 mL of bone marrow to augment the bone graft. Once this was completed, the rods were then placed and the locking caps and the construct was finally tightened in place. Once this was completed, I proceeded to irrigate, close the fascia with #1 Vicryl figure-of-eight interrupted sutures, followed by irrigating the subcu, closed subcu with 2-0 Vicryl, and the skin with a 3-0 running Monocryl and Dermabond.   Sterile dressing was applied. The patient was then awakened and transferred to the PACU in stable condition. POSTOPERATIVE PLAN:  The patient is going to remain here overnight. We are going to give her SCDs and BEREKET hoses for DVT prophylaxis and Ancef for infection prophylaxis. Also please note that once the time-out was obtained, we verified that she had received vancomycin for preoperative antibiotics as SHE IS ALLERGIC TO PENICILLIN, and Ancef is not indicated.       Tom Ward MD      [id number]  D:  05/07/2019 16:13  T:  05/08/2019 0:41  JOB #:  [id number]  CC:   Sylvia Campos MD

## 2019-05-08 NOTE — PROGRESS NOTES
Started 1 unit of PRBCs. Mews score was 4. NP Matthew Bee is aware. Starting blood in response to low blood pressure and high heart rate. Will continue to monitor. Patient instructed to inform staff of any trouble breathing, itchy skin, or chest pain. Patient denies any symptoms at this time

## 2019-05-08 NOTE — PROGRESS NOTES
Bedside shift change report given to Oumar HILL (oncoming nurse) by Peter Galindo (offgoing nurse). Report included the following information SBAR, Kardex, Procedure Summary, Intake/Output, MAR, Accordion, Recent Results and Med Rec Status.

## 2019-05-09 LAB
ABO + RH BLD: NORMAL
ALBUMIN SERPL-MCNC: 2.7 G/DL (ref 3.5–5)
ALBUMIN/GLOB SERPL: 0.7 {RATIO} (ref 1.1–2.2)
ALP SERPL-CCNC: 92 U/L (ref 45–117)
ALT SERPL-CCNC: 19 U/L (ref 12–78)
ANION GAP SERPL CALC-SCNC: 6 MMOL/L (ref 5–15)
AST SERPL-CCNC: 82 U/L (ref 15–37)
BILIRUB SERPL-MCNC: 0.5 MG/DL (ref 0.2–1)
BLD PROD TYP BPU: NORMAL
BLOOD GROUP ANTIBODIES SERPL: NORMAL
BPU ID: NORMAL
BUN SERPL-MCNC: 9 MG/DL (ref 6–20)
BUN/CREAT SERPL: 13 (ref 12–20)
CALCIUM SERPL-MCNC: 8.1 MG/DL (ref 8.5–10.1)
CHLORIDE SERPL-SCNC: 110 MMOL/L (ref 97–108)
CO2 SERPL-SCNC: 27 MMOL/L (ref 21–32)
CREAT SERPL-MCNC: 0.71 MG/DL (ref 0.55–1.02)
CROSSMATCH RESULT,%XM: NORMAL
GLOBULIN SER CALC-MCNC: 3.7 G/DL (ref 2–4)
GLUCOSE BLD STRIP.AUTO-MCNC: 116 MG/DL (ref 65–100)
GLUCOSE BLD STRIP.AUTO-MCNC: 133 MG/DL (ref 65–100)
GLUCOSE BLD STRIP.AUTO-MCNC: 135 MG/DL (ref 65–100)
GLUCOSE BLD STRIP.AUTO-MCNC: 139 MG/DL (ref 65–100)
GLUCOSE SERPL-MCNC: 130 MG/DL (ref 65–100)
HGB BLD-MCNC: 7 G/DL (ref 11.5–16)
POTASSIUM SERPL-SCNC: 3.3 MMOL/L (ref 3.5–5.1)
PROT SERPL-MCNC: 6.4 G/DL (ref 6.4–8.2)
SERVICE CMNT-IMP: ABNORMAL
SODIUM SERPL-SCNC: 143 MMOL/L (ref 136–145)
SPECIMEN EXP DATE BLD: NORMAL
STATUS OF UNIT,%ST: NORMAL
UNIT DIVISION, %UDIV: 0

## 2019-05-09 PROCEDURE — 85018 HEMOGLOBIN: CPT

## 2019-05-09 PROCEDURE — 82962 GLUCOSE BLOOD TEST: CPT

## 2019-05-09 PROCEDURE — 65270000029 HC RM PRIVATE

## 2019-05-09 PROCEDURE — 36415 COLL VENOUS BLD VENIPUNCTURE: CPT

## 2019-05-09 PROCEDURE — 80053 COMPREHEN METABOLIC PANEL: CPT

## 2019-05-09 PROCEDURE — 97535 SELF CARE MNGMENT TRAINING: CPT

## 2019-05-09 PROCEDURE — 74011636637 HC RX REV CODE- 636/637: Performed by: ORTHOPAEDIC SURGERY

## 2019-05-09 PROCEDURE — 97530 THERAPEUTIC ACTIVITIES: CPT

## 2019-05-09 PROCEDURE — 74011250637 HC RX REV CODE- 250/637: Performed by: ORTHOPAEDIC SURGERY

## 2019-05-09 PROCEDURE — 97116 GAIT TRAINING THERAPY: CPT

## 2019-05-09 PROCEDURE — 51798 US URINE CAPACITY MEASURE: CPT

## 2019-05-09 PROCEDURE — 77010033678 HC OXYGEN DAILY

## 2019-05-09 PROCEDURE — 74011250636 HC RX REV CODE- 250/636: Performed by: NURSE PRACTITIONER

## 2019-05-09 RX ORDER — AMOXICILLIN 250 MG
1 CAPSULE ORAL 2 TIMES DAILY
Qty: 30 TAB | Refills: 0 | Status: SHIPPED | OUTPATIENT
Start: 2019-05-09 | End: 2019-05-24

## 2019-05-09 RX ORDER — ACETAMINOPHEN 500 MG
1000 TABLET ORAL EVERY 6 HOURS
Qty: 112 TAB | Refills: 0 | Status: SHIPPED | OUTPATIENT
Start: 2019-05-09 | End: 2019-05-23

## 2019-05-09 RX ORDER — ONDANSETRON 2 MG/ML
4 INJECTION INTRAMUSCULAR; INTRAVENOUS
Status: DISCONTINUED | OUTPATIENT
Start: 2019-05-09 | End: 2019-05-11 | Stop reason: HOSPADM

## 2019-05-09 RX ORDER — POLYETHYLENE GLYCOL 3350 17 G/17G
17 POWDER, FOR SOLUTION ORAL DAILY
Qty: 15 PACKET | Refills: 0 | Status: SHIPPED | OUTPATIENT
Start: 2019-05-10 | End: 2019-05-25

## 2019-05-09 RX ORDER — OXYCODONE HYDROCHLORIDE 5 MG/1
5 TABLET ORAL
Qty: 60 TAB | Refills: 0 | Status: SHIPPED | OUTPATIENT
Start: 2019-05-09 | End: 2019-05-23

## 2019-05-09 RX ADMIN — FAMOTIDINE 20 MG: 20 TABLET ORAL at 18:52

## 2019-05-09 RX ADMIN — RANITIDINE 300 MG: 15 SYRUP ORAL at 10:18

## 2019-05-09 RX ADMIN — INSULIN LISPRO 3 UNITS: 100 INJECTION, SOLUTION INTRAVENOUS; SUBCUTANEOUS at 10:19

## 2019-05-09 RX ADMIN — LEVOTHYROXINE SODIUM 112 MCG: 112 TABLET ORAL at 06:31

## 2019-05-09 RX ADMIN — OXYCODONE HYDROCHLORIDE 5 MG: 5 TABLET ORAL at 10:28

## 2019-05-09 RX ADMIN — GABAPENTIN 100 MG: 100 CAPSULE ORAL at 22:00

## 2019-05-09 RX ADMIN — METFORMIN HYDROCHLORIDE 500 MG: 500 TABLET ORAL at 16:39

## 2019-05-09 RX ADMIN — INSULIN LISPRO 3 UNITS: 100 INJECTION, SOLUTION INTRAVENOUS; SUBCUTANEOUS at 13:01

## 2019-05-09 RX ADMIN — POTASSIUM CHLORIDE 10 MEQ: 750 TABLET, EXTENDED RELEASE ORAL at 16:39

## 2019-05-09 RX ADMIN — OXYCODONE HYDROCHLORIDE 5 MG: 5 TABLET ORAL at 13:37

## 2019-05-09 RX ADMIN — Medication 10 ML: at 06:32

## 2019-05-09 RX ADMIN — OXYCODONE HYDROCHLORIDE 5 MG: 5 TABLET ORAL at 06:45

## 2019-05-09 RX ADMIN — ASPIRIN 81 MG: 81 TABLET, COATED ORAL at 10:17

## 2019-05-09 RX ADMIN — ACETAMINOPHEN 1000 MG: 500 TABLET ORAL at 13:02

## 2019-05-09 RX ADMIN — DULOXETINE HYDROCHLORIDE 60 MG: 30 CAPSULE, DELAYED RELEASE ORAL at 10:17

## 2019-05-09 RX ADMIN — OXYCODONE HYDROCHLORIDE 10 MG: 5 TABLET ORAL at 16:40

## 2019-05-09 RX ADMIN — CARVEDILOL 12.5 MG: 12.5 TABLET, FILM COATED ORAL at 10:18

## 2019-05-09 RX ADMIN — ACETAMINOPHEN 1000 MG: 500 TABLET ORAL at 18:52

## 2019-05-09 RX ADMIN — ACETAMINOPHEN 1000 MG: 500 TABLET ORAL at 06:31

## 2019-05-09 RX ADMIN — GABAPENTIN 100 MG: 100 CAPSULE ORAL at 10:17

## 2019-05-09 RX ADMIN — Medication 10 ML: at 22:01

## 2019-05-09 RX ADMIN — GLIMEPIRIDE 4 MG: 1 TABLET ORAL at 10:17

## 2019-05-09 RX ADMIN — METFORMIN HYDROCHLORIDE 500 MG: 500 TABLET ORAL at 10:17

## 2019-05-09 RX ADMIN — SENNOSIDES AND DOCUSATE SODIUM 1 TABLET: 8.6; 5 TABLET ORAL at 18:53

## 2019-05-09 RX ADMIN — ONDANSETRON 4 MG: 2 INJECTION INTRAMUSCULAR; INTRAVENOUS at 17:08

## 2019-05-09 RX ADMIN — FAMOTIDINE 20 MG: 20 TABLET ORAL at 10:18

## 2019-05-09 RX ADMIN — POTASSIUM CHLORIDE 10 MEQ: 750 TABLET, EXTENDED RELEASE ORAL at 22:00

## 2019-05-09 RX ADMIN — Medication 10 ML: at 13:06

## 2019-05-09 RX ADMIN — CARVEDILOL 12.5 MG: 12.5 TABLET, FILM COATED ORAL at 16:39

## 2019-05-09 RX ADMIN — GABAPENTIN 100 MG: 100 CAPSULE ORAL at 16:39

## 2019-05-09 RX ADMIN — INSULIN GLARGINE 18 UNITS: 100 INJECTION, SOLUTION SUBCUTANEOUS at 21:58

## 2019-05-09 RX ADMIN — SENNOSIDES AND DOCUSATE SODIUM 1 TABLET: 8.6; 5 TABLET ORAL at 10:18

## 2019-05-09 RX ADMIN — POLYETHYLENE GLYCOL 3350 17 G: 17 POWDER, FOR SOLUTION ORAL at 10:18

## 2019-05-09 RX ADMIN — CLOPIDOGREL BISULFATE 75 MG: 75 TABLET ORAL at 10:17

## 2019-05-09 RX ADMIN — POTASSIUM CHLORIDE 10 MEQ: 750 TABLET, EXTENDED RELEASE ORAL at 10:17

## 2019-05-09 NOTE — PROGRESS NOTES
Problem: Self Care Deficits Care Plan (Adult) Goal: *Acute Goals and Plan of Care (Insert Text) Description Occupational Therapy Goals Initiated 5/7/2019 Goals remain the same at Re-eval 
 
1. Patient will perform bathing with minimal assistance/contact guard assist using Reacher PRN within 7 days. 2.  Patient will perform lower body dressing with moderate assistance  using most appropriate DME within 7 days. 3.  Patient will toileting at supervision/set-up within 7 days. 4.  Patient will don/doff back brace at minimal assistance/contact guard assist within 7 days. 5.  Patient will verbalize/demonstrate 3/3 back precautions during ADL tasks without cues within 7 days. Outcome: Progressing Towards Goal 
  
OCCUPATIONAL THERAPY TREATMENT Patient: Aurelia Montague (31 y.o. female) Date: 5/9/2019 Diagnosis: Cervical stenosis of spinal canal [M48.02] Spinal stenosis of lumbar region at multiple levels [M48.061] S/P lumbar spinal fusion Procedure(s) (LRB): STAGED CASE PART 2 - L2-5 POSTERIOR DECOMPRESSION AND FUSION (N/A) 2 Days Post-Op Precautions: Spinal 
Chart, occupational therapy assessment, plan of care, and goals were reviewed. ASSESSMENT: 
Per Pt, patient had questions regarding long-handled shoe horn during PT session, therefore brought AE to session. Patient is received up in chair reporting increased pain, requesting to return to bed, but is agreeable to education on AE . Visual demonstration provided on shoe horn use. Patient performed donning slip on shoes with mod assist due to increased swelling in B feet. Agreeable to demonstration of other AE (reacher, sock aide, etc.) however declined performing on herself at this time. Performed transfer from chair to bed with CGA with rolling walker, with verbal cues for safe and appropriate distance. Doffed LSO with supervision with verbal/tactile cues.  Performed bed mobility and log roll with min A to prevent twisting. Patient would benefit from [facility] therapy at discharge with family assist/supervision. Progression toward goals: 
?       Improving appropriately and progressing toward goals ? Improving slowly and progressing toward goals ? Not making progress toward goals and plan of care will be adjusted PLAN: 
Patient continues to benefit from skilled intervention to address the above impairments. Continue treatment per established plan of care. Discharge Recommendations:  Home Health Further Equipment Recommendations for Discharge:  Discussed AE and where to purchase. Patient and family member present and verbalized understanding. SUBJECTIVE:  
Patient stated ?ooooh it hurts so bad. ? RN cleared patient for therapy. Patient agreeable to participate. OBJECTIVE DATA SUMMARY:  
Cognitive/Behavioral Status: 
Neurologic State: Alert Orientation Level: Oriented X4 Cognition: Follows commands; Appropriate decision making; Appropriate for age attention/concentration Perception: Appears intact Perseveration: No perseveration noted Safety/Judgement: Fall prevention Functional Mobility and Transfers for ADLs: 
Bed Mobility: 
Rolling: Minimum assistance(assist at B knees to prevent twisting) Supine to Sit: Contact guard assistance Sit to Supine: Minimum assistance(for LE management due to increased pain ) Scooting: Supervision Transfers: 
Sit to Stand: Contact guard assistance; Additional time Bed to Chair: Contact guard assistance; Additional time(verbal cues to maintain safe/appropriate rw distance) Balance: 
Sitting: Intact Standing: Impaired; With support(with rolling walker) Standing - Static: Good;Constant support Standing - Dynamic : Fair;Constant support ADL Intervention: 
Upper Body Dressing Assistance Dressing Assistance: Supervision Orthotics(Brace): Supervision Cues: Verbal cues provided; Tactile cues provided Lower Body Dressing Assistance Dressing Assistance: Moderate assistance Slip on Shoes with Back: Moderate assistance(due to B feet swelling and inexperience with shoe horn) Position Performed: Seated in chair Cues: Physical assistance;Verbal cues provided;Visual cues provided; Tactile cues provided Adaptive Equipment Used: Long handled shoe horn Cognitive Retraining Safety/Judgement: Fall prevention Pain: 
Patient verbalized pain during session, however unable to provide number on 0-10 scale. Repositioned in bed to patient comfort. RN notified of same. Activity Tolerance:  
Patient on supplemental O2 during session, with saturations at 96% at rest. Denied feeling lightheaded or dizzy during session. After treatment:  
? Patient left in no apparent distress sitting up in chair ? Patient left in no apparent distress in bed 
? Call bell left within reach ? Nursing notified ? Caregiver present ? Bed alarm activated COMMUNICATION/COLLABORATION:  
The patient?s plan of care was discussed with: Physical Therapist and Registered Nurse Larissa Delgado OT Time Calculation: 29 mins

## 2019-05-09 NOTE — PROGRESS NOTES
Ortho / Neurosurgery NP Note POD# 2  s/p STAGED CASE PART 2 - L2-5 POSTERIOR DECOMPRESSION AND FUSION, POD# 3 L2-5 Lateral decompression and fusion Pt out of bed ambulating with therapy Rosa CAPUTO Afebrile. Voiding status: Requiring I/O catheterization at times, voided this morning Labs Lab Results Component Value Date/Time HGB 7.0 (L) 05/09/2019 06:54 AM  
  
Lab Results Component Value Date/Time INR 1.1 05/01/2019 07:44 AM  
  
 
Body mass index is 30.83 kg/m². : A BMI > 30 is classified as obesity and > 40 is classified as morbid obesity. Posterior [place] Dressing c.d.i Left lateral dressing c.d.i Drain removed on POD 2 Calves soft and supple; No pain with passive stretch Sensation and motor intact SCDs for mechanical DVT proph while in bed PLAN: 
1) PT BID 
2) Aspirin 81 mg PO BID for DVT Prophylaxis 3) GI Prophylaxis - Pepcid 4) Hypokalemia preadmission- 3.3 this morning was 3.2 prior to surgery- will continue oral replacement. 5) Readniess for discharge: 
   [] Vital Signs stable- hypotensive resolved since blood administration. However, remains tachycardic up to 115. Will continue to monitor and encourage fluids as this could be related to hypovolemia. [] Hgb stable- HGB 7.0 this morning up from 6.2 after receiving 1 unit PRBC- will continue to monitor and transfuse if becomes symptomatic 
 [] + Voiding- voided this AM, however has been requiring I/O cath- will continue to monitor PVR [x] Wound intact, drainage minimal  
 [x] Tolerating PO intake   
 [] Cleared by PT (OT if applicable) [] Stair training completed (if applicable) [] Independent / Contact Guard Assist (household distance) [] Bed mobility [] Car transfers  
  [] ADLs [x] Adequate pain control on oral medication alone Plan for discharge pending medical readiness and therapy Ray Zuniga NP 
DNP, AGACNP-BC

## 2019-05-09 NOTE — PROGRESS NOTES
Problem: Mobility Impaired (Adult and Pediatric) Goal: *Acute Goals and Plan of Care (Insert Text) Description Physical Therapy Goals Initiated 5/7/2019 - remain appropriate at re-eval 
 
1. Patient will move from supine to sit and sit to supine  in bed with modified independence within 4 days. 2. Patient will perform sit to stand with modified independence within 4 days. 3. Patient will ambulate with modified independence for 150 feet with the least restrictive device within 4 days. 4. Patient will ascend/descend 5 stairs with 1 handrail(s) with modified independence within 4 days. 5. Patient will verbalize and demonstrate understanding of spinal precautions (No bending, lifting greater than 5 lbs, or twisting; log-roll technique; frequent repositioning as instructed) within 4 days. 5/9/2019 1423 by Donna Thompson, PT Outcome: Progressing Towards Goal 
5/9/2019 1052 by Donna Thompson, PT Outcome: Progressing Towards Goal 
 PHYSICAL THERAPY TREATMENT Patient: Austyn James (11 y.o. female) Date: 5/9/2019 Diagnosis: Cervical stenosis of spinal canal [M48.02] Spinal stenosis of lumbar region at multiple levels [M48.061] S/P lumbar spinal fusion Procedure(s) (LRB): STAGED CASE PART 2 - L2-5 POSTERIOR DECOMPRESSION AND FUSION (N/A) 2 Days Post-Op Precautions: Spinal 
Chart, physical therapy assessment, plan of care and goals were reviewed. ASSESSMENT: 
Pt with reports of increased pain in low back and BLEs during PM therapy session which limited progression of functional mobility. Pt only able to tolerate brief ambulation trial of 100ft w/ RW and CGAx1 before fatigue/refusing further participation with therapy. Gait remains slow and shuffled with pt requiring multiple brief standing rest breaks. Steady overall with RW support however.  Pt required max verbal cues to keep eyes open during ambulation trial as she tended to ambulate with eyes in closed position. Despite steady gains noted during therapy, pt continues to function below her baseline mod I level secondary to increased pain levels, BLE weakness, decreased activity tolerance, self-limiting behaviors/beliefs, and impaired balance. All mobility occurred on RA with O2 sats 87% post activity. Continue to recommend pt return home w/ assist of family and [place] PT at discharge. Progression toward goals: 
?    Improving appropriately and progressing toward goals ? Improving slowly and progressing toward goals ? Not making progress toward goals and plan of care will be adjusted PLAN: 
Patient continues to benefit from skilled intervention to address the above impairments. Continue treatment per established plan of care. Discharge Recommendations:  Home Health Further Equipment Recommendations for Discharge: Owns necessary equipment SUBJECTIVE:  
Patient stated ? I didn't think it would hurt THIS BAD. ? OBJECTIVE DATA SUMMARY:  
Critical Behavior: 
Neurologic State: Alert Orientation Level: Oriented X4 Cognition: Follows commands Safety/Judgement: Awareness of environment Functional Mobility Training: 
Bed Mobility: 
Rolling: Supervision Supine to Sit: Contact guard assistance Scooting: Supervision Transfers: 
Sit to Stand: Contact guard assistance Stand to Sit: Contact guard assistance Bed to Chair: Contact guard assistance Balance: 
Sitting: Intact Standing: Impaired; With support Standing - Static: Good;Constant support Standing - Dynamic : Fair Ambulation/Gait Training: 
Distance (ft): 100 Feet (ft) Assistive Device: Walker, rolling;Gait belt Ambulation - Level of Assistance: Contact guard assistance Gait Abnormalities: Decreased step clearance;Shuffling gait Base of Support: Widened Speed/Jessica: Pace decreased (<100 feet/min); Shuffled Step Length: Left shortened;Right shortened Stairs: Number of Stairs Trained: 4 Stairs - Level of Assistance: Minimum assistance Rail Use: Both 
Pain: 
Pain Scale 1: Numeric (0 - 10) Pain Intensity 1: 6 Pain Location 1: Back Pain Orientation 1: Lower Pain Description 1: Aching Pain Intervention(s) 1: Medication (see MAR) Activity Tolerance:  
O2 sats 87% post activity on RA,  bpm 
Please refer to the flowsheet for vital signs taken during this treatment. After treatment:  
?    Patient left in no apparent distress sitting up in chair ? Patient left in no apparent distress in bed 
? Call bell left within reach ? Nursing notified ? Caregiver present ? Bed alarm activated COMMUNICATION/COLLABORATION:  
The patient?s plan of care was discussed with: Registered Nurse Dipika Perez PT, DPT Time Calculation: 18 mins

## 2019-05-09 NOTE — PROGRESS NOTES
PLAN OF CARE: home with home health 1) at home care has accepted At [facility] has accepted patient for [facility] services. AVS has been updated. Glenna Hassan, [address], [address]

## 2019-05-09 NOTE — PROGRESS NOTES
Orthopedic End of Shift Note Bedside and Verbal shift change report given to Apolonia (oncoming nurse) by Pearl Schaefer (offgoing nurse). Report included the following information SBAR, Kardex, Procedure Summary, Intake/Output, MAR and Recent Results. POD# Significant issues during shift: pain control, oxygen sats Issues for Physician to address: pain control Activity This Shift 
(check all that apply) [] chair 
[] dangle [x] bathroom [x] bedside commode [] hallway 
[] bedrest  
Nausea/Vomiting [x] yes [] no    
Voiding Status [x] void [] Lane [] I&O Cath Bowel Movements [] yes [x] no Foot Pumps or SCD [] yes [x] no Ice Pack [x] yes    [] no Incentive Spirometer [x] yes [] no Volume:   750 Telemetry Monitoring   [] yes [x] no Rhythm:  
Supplemental O2 [x] yes [] no Sat off O2:   85%

## 2019-05-09 NOTE — PROGRESS NOTES
ORTHO POST OP SPINE PROGRESS NOTE May 9, 2019 Admit Date: 2019 Admit Diagnosis: Cervical stenosis of spinal canal [M48.02] Spinal stenosis of lumbar region at multiple levels [M48.061] Procedure: Procedure(s): STAGED CASE PART 2 - L2-5 POSTERIOR DECOMPRESSION AND FUSION Post Op day: 2 Days Post-Op Subjective:  
 
Orlando Murcia is a patient who has complaints Of pain in the back. She states her right leg is feeling better today. She is postop day 2 and 3 respectively of L2 through L5 posterior and lateral decompression fusion done in a staged fashion. She states she has been able to void.  at bedside. States she is feeling somewhat better today. Hemoglobin 6.2 yesterday given 1 unit PRBC and at 7.0 this morning Review of Systems: Pertinent items are noted in HPI. Objective:  
 
PT/OT:  
Distance Ambulated:          
Time Ambulated (min):       
Ambulation Response: Activity Response: Fairly tolerated Assistive Device:              Assistive Device: Fall prevention device, Walker (comment) Vital Signs:   
Blood pressure 130/82, pulse (!) 108, temperature 97.8 °F (36.6 °C), resp. rate 14, height 5' 4.25\" (1.632 m), weight 82.1 kg (181 lb), SpO2 95 %, not currently breastfeeding. Temp (24hrs), Av.2 °F (36.8 °C), Min:97.8 °F (36.6 °C), Max:98.5 °F (36.9 °C) 
 
 
701 - 1900 In: -  
Out: 155 [Urine:150; Drains:5] 1901 -  07 In: -  
Out: 3211 [Urine:1100; Drains:5] LAB:   
Recent Labs 19 
6334 HGB 7.0* Wound/Drain Assessment: 
Drain:   
 
Dressing:  
 
Physical Exam: 
Neurological: no deficit Incision clean, dry, and intact 5/5 strength bilateral lower extremities with the exception of the bilateral hip flexors and knee extensors 4-5 Assessment:  
  
Patient Active Problem List  
Diagnosis Code  LBP (low back pain) M54.5  DM (diabetes mellitus) ([facility] E11.9  Hypercholesterolemia E78.00  
  Diabetes type 2, uncontrolled ([facility] E11.65  Diabetic autonomic neuropathy associated with type 2 diabetes mellitus ([facility] E11.43  
 Hypovitaminosis D E55.9  Leg pain, bilateral M79.604, M79.605  Essential hypertension I10  
 Hypothyroidism E03.9  Cervical stenosis of spinal canal M48.02  
 Cervical stenosis of spine M48.02  
 CAD (coronary artery disease) I25.10  
 S/P lumbar spinal fusion Z98.1  Spinal stenosis of lumbar region at multiple levels M48.061 Plan:  
 
Continue PT/OT/Rehab Discontinue:  
Consult: PT  and OT Discharge To:   Home.

## 2019-05-09 NOTE — PROGRESS NOTES
Problem: Mobility Impaired (Adult and Pediatric) Goal: *Acute Goals and Plan of Care (Insert Text) Description Physical Therapy Goals Initiated 5/7/2019 - remain appropriate at re-eval 
 
1. Patient will move from supine to sit and sit to supine  in bed with modified independence within 4 days. 2. Patient will perform sit to stand with modified independence within 4 days. 3. Patient will ambulate with modified independence for 150 feet with the least restrictive device within 4 days. 4. Patient will ascend/descend 5 stairs with 1 handrail(s) with modified independence within 4 days. 5. Patient will verbalize and demonstrate understanding of spinal precautions (No bending, lifting greater than 5 lbs, or twisting; log-roll technique; frequent repositioning as instructed) within 4 days. Outcome: Progressing Towards Goal 
 PHYSICAL THERAPY TREATMENT Patient: María Awad (56 y.o. female) Date: 5/9/2019 Diagnosis: Cervical stenosis of spinal canal [M48.02] Spinal stenosis of lumbar region at multiple levels [M48.061] S/P lumbar spinal fusion Procedure(s) (LRB): STAGED CASE PART 2 - L2-5 POSTERIOR DECOMPRESSION AND FUSION (N/A) 2 Days Post-Op Precautions: Spinal 
Chart, physical therapy assessment, plan of care and goals were reviewed. ASSESSMENT: 
Pt received supine in bed, agreeable to participation with therapy. Pt is making slow, steady gains towards therapy goals however continues to present with increased anxiety, generalized weakness, and decreased endurance. Pt able to progress ambulation trial to a distance of 120ft w/ RW and CGAx1, exhibiting slow, shuffled gait pattern. Gait steady overall with no LOB demonstrated. Pt required minAx1 as well as MAX verbal cues for encouragement/positive reinforcement while ascending/descended 4 steps w/ bilateral handrail use. Significant anxiety noted during stair climbing.  All mobility occurred on RA with O2 sats 88% post activity with HR elevated to 122 bpm. Instructed pt in pursed lip breathing technique as well as use of incentive spirometer. Continue to recommend [facility] PT w/ assist of family at discharge. Progression toward goals: 
?      Improving appropriately and progressing toward goals ? Improving slowly and progressing toward goals ? Not making progress toward goals and plan of care will be adjusted PLAN: 
Patient continues to benefit from skilled intervention to address the above impairments. Continue treatment per established plan of care. Discharge Recommendations:  Home Health Further Equipment Recommendations for Discharge: Owns necessary equipment SUBJECTIVE:  
Patient stated Oh no, the doctor is going to be mad that my heart rate is so high.  The patient stated 3/3 back precautions. Reviewed all 3 with patient. OBJECTIVE DATA SUMMARY:  
Critical Behavior: 
Neurologic State: Alert Orientation Level: Oriented X4 Cognition: Follows commands Safety/Judgement: Awareness of environment Functional Mobility Training: 
Bed Mobility:  
Log Rolling: Supervision Supine to Sit: Supervision Scooting: Supervision Brace donned with  minimal assistance/contact guard assist while standing at EOB Transfers: 
Sit to Stand: Supervision Stand to Sit: Supervision Bed to Chair: Contact guard assistance Balance: 
Sitting: Intact Standing: Impaired; With support Standing - Static: Good;Constant support Standing - Dynamic : Fair Ambulation/Gait Training: 
Distance (ft): 120 Feet (ft) Assistive Device: Walker, rolling;Gait belt;Brace/Splint Ambulation - Level of Assistance: Contact guard assistance Gait Abnormalities: Decreased step clearance;Shuffling gait Base of Support: Widened Speed/Jessica: Pace decreased (<100 feet/min); Slow Step Length: Left shortened;Right shortened Stairs: 
Number of Stairs Trained: 4 Stairs - Level of Assistance: Minimum assistance Rail Use: Both 
Pain: 
Pain Scale 1: Numeric (0 - 10) Pain Intensity 1: 5 Pain Location 1: Back Pain Orientation 1: Lower Pain Description 1: Aching Pain Intervention(s) 1: Medication (see MAR) Activity Tolerance:  
O2 sats 88% post activity, HR elevated to 122 bpm 
Please refer to the flowsheet for vital signs taken during this treatment. After treatment:  
?  Patient left in no apparent distress sitting up in chair ? Patient left in no apparent distress in bed 
? Call bell left within reach ? Nursing notified ? Caregiver present ? Bed alarm activated COMMUNICATION/COLLABORATION:  
The patients plan of care was discussed with: Registered Nurse and NP Jorge Otrega PT, DPT Time Calculation: 34 mins

## 2019-05-10 ENCOUNTER — APPOINTMENT (OUTPATIENT)
Dept: GENERAL RADIOLOGY | Age: 64
DRG: 455 | End: 2019-05-10
Attending: NURSE PRACTITIONER
Payer: MEDICARE

## 2019-05-10 LAB
GLUCOSE BLD STRIP.AUTO-MCNC: 105 MG/DL (ref 65–100)
GLUCOSE BLD STRIP.AUTO-MCNC: 150 MG/DL (ref 65–100)
GLUCOSE BLD STRIP.AUTO-MCNC: 58 MG/DL (ref 65–100)
GLUCOSE BLD STRIP.AUTO-MCNC: 59 MG/DL (ref 65–100)
GLUCOSE BLD STRIP.AUTO-MCNC: 60 MG/DL (ref 65–100)
GLUCOSE BLD STRIP.AUTO-MCNC: 66 MG/DL (ref 65–100)
GLUCOSE BLD STRIP.AUTO-MCNC: 91 MG/DL (ref 65–100)
GLUCOSE BLD STRIP.AUTO-MCNC: 93 MG/DL (ref 65–100)
HGB BLD-MCNC: 6.7 G/DL (ref 11.5–16)
SERVICE CMNT-IMP: ABNORMAL
SERVICE CMNT-IMP: NORMAL

## 2019-05-10 PROCEDURE — 97530 THERAPEUTIC ACTIVITIES: CPT

## 2019-05-10 PROCEDURE — 97535 SELF CARE MNGMENT TRAINING: CPT

## 2019-05-10 PROCEDURE — 74011636637 HC RX REV CODE- 636/637: Performed by: ORTHOPAEDIC SURGERY

## 2019-05-10 PROCEDURE — 97116 GAIT TRAINING THERAPY: CPT

## 2019-05-10 PROCEDURE — 36430 TRANSFUSION BLD/BLD COMPNT: CPT

## 2019-05-10 PROCEDURE — 74011250637 HC RX REV CODE- 250/637: Performed by: ORTHOPAEDIC SURGERY

## 2019-05-10 PROCEDURE — 72100 X-RAY EXAM L-S SPINE 2/3 VWS: CPT

## 2019-05-10 PROCEDURE — 82962 GLUCOSE BLOOD TEST: CPT

## 2019-05-10 PROCEDURE — 86900 BLOOD TYPING SEROLOGIC ABO: CPT

## 2019-05-10 PROCEDURE — 36415 COLL VENOUS BLD VENIPUNCTURE: CPT

## 2019-05-10 PROCEDURE — 94760 N-INVAS EAR/PLS OXIMETRY 1: CPT

## 2019-05-10 PROCEDURE — 77010033678 HC OXYGEN DAILY

## 2019-05-10 PROCEDURE — 65270000029 HC RM PRIVATE

## 2019-05-10 PROCEDURE — 86923 COMPATIBILITY TEST ELECTRIC: CPT

## 2019-05-10 PROCEDURE — 85018 HEMOGLOBIN: CPT

## 2019-05-10 PROCEDURE — P9016 RBC LEUKOCYTES REDUCED: HCPCS

## 2019-05-10 RX ORDER — SODIUM CHLORIDE 9 MG/ML
250 INJECTION, SOLUTION INTRAVENOUS AS NEEDED
Status: DISCONTINUED | OUTPATIENT
Start: 2019-05-10 | End: 2019-05-11 | Stop reason: HOSPADM

## 2019-05-10 RX ADMIN — POLYETHYLENE GLYCOL 3350 17 G: 17 POWDER, FOR SOLUTION ORAL at 10:25

## 2019-05-10 RX ADMIN — CLOPIDOGREL BISULFATE 75 MG: 75 TABLET ORAL at 10:25

## 2019-05-10 RX ADMIN — DULOXETINE HYDROCHLORIDE 60 MG: 30 CAPSULE, DELAYED RELEASE ORAL at 10:26

## 2019-05-10 RX ADMIN — GABAPENTIN 100 MG: 100 CAPSULE ORAL at 00:00

## 2019-05-10 RX ADMIN — FAMOTIDINE 20 MG: 20 TABLET ORAL at 17:25

## 2019-05-10 RX ADMIN — POTASSIUM CHLORIDE 10 MEQ: 750 TABLET, EXTENDED RELEASE ORAL at 10:25

## 2019-05-10 RX ADMIN — ACETAMINOPHEN 1000 MG: 500 TABLET ORAL at 00:17

## 2019-05-10 RX ADMIN — INSULIN LISPRO 2 UNITS: 100 INJECTION, SOLUTION INTRAVENOUS; SUBCUTANEOUS at 12:29

## 2019-05-10 RX ADMIN — Medication 10 ML: at 05:18

## 2019-05-10 RX ADMIN — OXYCODONE HYDROCHLORIDE 10 MG: 5 TABLET ORAL at 22:10

## 2019-05-10 RX ADMIN — LEVOTHYROXINE SODIUM 112 MCG: 112 TABLET ORAL at 05:18

## 2019-05-10 RX ADMIN — GLIMEPIRIDE 4 MG: 1 TABLET ORAL at 08:34

## 2019-05-10 RX ADMIN — GABAPENTIN 100 MG: 100 CAPSULE ORAL at 15:05

## 2019-05-10 RX ADMIN — ASPIRIN 81 MG: 81 TABLET, COATED ORAL at 10:26

## 2019-05-10 RX ADMIN — Medication 10 ML: at 22:11

## 2019-05-10 RX ADMIN — ACETAMINOPHEN 1000 MG: 500 TABLET ORAL at 23:21

## 2019-05-10 RX ADMIN — METFORMIN HYDROCHLORIDE 500 MG: 500 TABLET ORAL at 08:35

## 2019-05-10 RX ADMIN — RANITIDINE 300 MG: 15 SYRUP ORAL at 10:26

## 2019-05-10 RX ADMIN — ACETAMINOPHEN 1000 MG: 500 TABLET ORAL at 17:24

## 2019-05-10 RX ADMIN — ROSUVASTATIN CALCIUM 20 MG: 10 TABLET, COATED ORAL at 00:17

## 2019-05-10 RX ADMIN — CARVEDILOL 12.5 MG: 12.5 TABLET, FILM COATED ORAL at 08:35

## 2019-05-10 RX ADMIN — OXYCODONE HYDROCHLORIDE 10 MG: 5 TABLET ORAL at 10:26

## 2019-05-10 RX ADMIN — OXYCODONE HYDROCHLORIDE 10 MG: 5 TABLET ORAL at 15:05

## 2019-05-10 RX ADMIN — POTASSIUM CHLORIDE 10 MEQ: 750 TABLET, EXTENDED RELEASE ORAL at 22:11

## 2019-05-10 RX ADMIN — POTASSIUM CHLORIDE 10 MEQ: 750 TABLET, EXTENDED RELEASE ORAL at 15:05

## 2019-05-10 RX ADMIN — SENNOSIDES AND DOCUSATE SODIUM 1 TABLET: 8.6; 5 TABLET ORAL at 17:25

## 2019-05-10 RX ADMIN — INSULIN LISPRO 3 UNITS: 100 INJECTION, SOLUTION INTRAVENOUS; SUBCUTANEOUS at 12:29

## 2019-05-10 RX ADMIN — FAMOTIDINE 20 MG: 20 TABLET ORAL at 10:25

## 2019-05-10 RX ADMIN — GABAPENTIN 100 MG: 100 CAPSULE ORAL at 10:26

## 2019-05-10 RX ADMIN — INSULIN LISPRO 3 UNITS: 100 INJECTION, SOLUTION INTRAVENOUS; SUBCUTANEOUS at 08:34

## 2019-05-10 RX ADMIN — OXYCODONE HYDROCHLORIDE 10 MG: 5 TABLET ORAL at 05:18

## 2019-05-10 RX ADMIN — Medication 10 ML: at 13:46

## 2019-05-10 RX ADMIN — SENNOSIDES AND DOCUSATE SODIUM 1 TABLET: 8.6; 5 TABLET ORAL at 10:25

## 2019-05-10 RX ADMIN — CARVEDILOL 12.5 MG: 12.5 TABLET, FILM COATED ORAL at 17:25

## 2019-05-10 RX ADMIN — METFORMIN HYDROCHLORIDE 500 MG: 500 TABLET ORAL at 17:25

## 2019-05-10 RX ADMIN — INSULIN LISPRO 3 UNITS: 100 INJECTION, SOLUTION INTRAVENOUS; SUBCUTANEOUS at 17:23

## 2019-05-10 RX ADMIN — ACETAMINOPHEN 1000 MG: 500 TABLET ORAL at 05:18

## 2019-05-10 RX ADMIN — INSULIN GLARGINE 9 UNITS: 100 INJECTION, SOLUTION SUBCUTANEOUS at 23:21

## 2019-05-10 NOTE — PROGRESS NOTES
Problem: Self Care Deficits Care Plan (Adult) Goal: *Acute Goals and Plan of Care (Insert Text) Description Occupational Therapy Goals Initiated 5/7/2019 Goals remain the same at Re-eval 
 
1. Patient will perform bathing with minimal assistance/contact guard assist using Reacher PRN within 7 days. 2.  Patient will perform lower body dressing with moderate assistance  using most appropriate DME within 7 days. 3.  Patient will toileting at supervision/set-up within 7 days. 4.  Patient will don/doff back brace at minimal assistance/contact guard assist within 7 days. 5.  Patient will verbalize/demonstrate 3/3 back precautions during ADL tasks without cues within 7 days. 5/10/2019 1514 by Collette Naas, OT Outcome: Progressing Towards Goal 
5/10/2019 1322 by Collette Naas, OT Outcome: Progressing Towards Goal 
 OCCUPATIONAL THERAPY TREATMENT Patient: Liliana Rodriguez (11 y.o. female) Date: 5/10/2019 Diagnosis: Cervical stenosis of spinal canal [M48.02] Spinal stenosis of lumbar region at multiple levels [M48.061] S/P lumbar spinal fusion Procedure(s) (LRB): STAGED CASE PART 2 - L2-5 POSTERIOR DECOMPRESSION AND FUSION (N/A) 3 Days Post-Op Precautions: Spinal 
Chart, occupational therapy assessment, plan of care, and goals were reviewed. ASSESSMENT: 
Pt was cleared to speak with about her AE and per nursing had just started receiving  blood. Pt was educated on use of AE and she was able to state how to use AE back to OT. Pt was reminded of her precautions and OT demonstrated how she was able to bend and not to twist in bed. Pt stated that she understood and recommend that pt return home with family and home care PT. Progression toward goals: 
?       Improving appropriately and progressing toward goals ? Improving slowly and progressing toward goals ? Not making progress toward goals and plan of care will be adjusted PLAN: 
 Patient continues to benefit from skilled intervention to address the above impairments. Continue treatment per established plan of care. Discharge Recommendations:  Home Health PT Further Equipment Recommendations for Discharge:  tbd SUBJECTIVE:  
Patient stated ? I understand how to do that.? OBJECTIVE DATA SUMMARY:  
Cognitive/Behavioral Status: 
Neurologic State: Alert Orientation Level: Oriented X4 Cognition: Follows commands; Impaired decision making; Impulsive Perception: Appears intact Perseveration: No perseveration noted Safety/Judgement: Fall prevention Functional Mobility and Transfers for ADLs: 
 
  
 
Transfers: 
Sit to Stand: Minimum assistance;Assist x2(from chair) Balance: 
Sitting: Intact Standing: Impaired; With support Standing - Static: Fair;Constant support Standing - Dynamic : Fair;Constant support ADL Intervention: 
Feeding Feeding Assistance: Independent Grooming Grooming Assistance: Set-up; Supervision Washing Face: Set-up; Supervision Washing Hands: Set-up; Supervision Brushing Teeth: Set-up; Supervision Shaving: Supervision Upper Body Bathing Bathing Assistance: Set-up Position Performed: Seated in chair Lower Body Bathing Bathing Assistance: Maximum assistance Perineal  : Maximum assistance;Minimum assistance Position Performed: Standing Upper Body Dressing Assistance Dressing Assistance: Set-up Toileting Toileting Assistance: Contact guard assistance Bladder Hygiene: Contact guard assistance Bowel Hygiene: Contact guard assistance Cognitive Retraining Safety/Judgement: Fall prevention Pain: 
Pain Scale 1: Numeric (0 - 10) Pain Intensity 1: 7 Pain Location 1: Back Pain Orientation 1: Lower Pain Description 1: Aching Pain Intervention(s) 1: Medication (see MAR) Activity Tolerance:  
fair Please refer to the flowsheet for vital signs taken during this treatment. After treatment: ? Patient left in no apparent distress sitting up in chair ? Patient left in no apparent distress in bed 
? Call bell left within reach ? Nursing notified ? Caregiver present ? Bed alarm activated COMMUNICATION/COLLABORATION:  
The patient?s plan of care was discussed with: Physical Therapist and Registered Nurse Gracelyn Blizzard, OT Time Calculation: 12 mins

## 2019-05-10 NOTE — PROGRESS NOTES
Loud noise heard from room, entered room to find patient on one knee with son holding her under both armpits in the doorway to the bathroom. Patient did not call for assistance to get to bathroom and son stated he had turned his back to move something and when he turned around she was rushing into the bathroom and lost her footing falling to the side. Patient was incontinent of urine during fall. Patient was lowered to the ground, gait belt put into place and patient was assisted to sitting position with assistance of 3 staff members. Herminio Telles NP notified. Nursing supervisor notified. Patient reports bumping her head on the door frame as she fell but denies pain in head, back, or anywhere else following event. No visible injury noted. Importance of calling hospital staff for assistance to ambulate to bathroom stressed with patient and family member.

## 2019-05-10 NOTE — PROGRESS NOTES
Started 1 unit of PRBCs. Patient tolerating well. Will monitor vital signs. Patient instructed to inform nursing staff of any chest pain, trouble breathing or itching skin.

## 2019-05-10 NOTE — PROGRESS NOTES
PLAN OF CARE: home with home health 1) at home care has accepted CM has sent message via [employer] to [facility] [employer] to make them aware that patient is still currently in hospital. CM to let [facility] know when patient does d/c. Willie Dimas, [address], [address]

## 2019-05-10 NOTE — PROGRESS NOTES
Patient oriented and able to get up with 1 assist.  Chair sitting tolerated well  Voiding on [facility]

## 2019-05-10 NOTE — PROGRESS NOTES
Bedside shift change report given to GERSON Romero (oncoming nurse) by Phoebe Chong RN (offgoing nurse). Report included the following information SBAR, Kardex, Procedure Summary, Intake/Output, MAR, Accordion and Recent Results.

## 2019-05-10 NOTE — PROGRESS NOTES
Problem: Mobility Impaired (Adult and Pediatric) Goal: *Acute Goals and Plan of Care (Insert Text) Description Physical Therapy Goals Initiated 5/7/2019 - remain appropriate at re-eval 
 
1. Patient will move from supine to sit and sit to supine  in bed with modified independence within 4 days. 2. Patient will perform sit to stand with modified independence within 4 days. 3. Patient will ambulate with modified independence for 150 feet with the least restrictive device within 4 days. 4. Patient will ascend/descend 5 stairs with 1 handrail(s) with modified independence within 4 days. 5. Patient will verbalize and demonstrate understanding of spinal precautions (No bending, lifting greater than 5 lbs, or twisting; log-roll technique; frequent repositioning as instructed) within 4 days. Outcome: Progressing Towards Goal 
 
PHYSICAL THERAPY TREATMENT Patient: eMghana Streeter (50 y.o. female) Date: 5/10/2019 Diagnosis: Cervical stenosis of spinal canal [M48.02] Spinal stenosis of lumbar region at multiple levels [M48.061] S/P lumbar spinal fusion Procedure(s) (LRB): STAGED CASE PART 2 - L2-5 POSTERIOR DECOMPRESSION AND FUSION (N/A) 3 Days Post-Op Precautions: Spinal 
Chart, physical therapy assessment, plan of care and goals were reviewed. ASSESSMENT: 
Patient agreeable to intervention after a short rest break following OT. Deferred further progression of gait d/t increased pain and fatigue after am fall and working with OT. Gait training x100' with RW and brace with CGA required for gait. Cued for erect posture and increased step length. Patient with a tendency towards flexed posture but responded positively to cues. Patient with pending unit PRBC for the afternoon so will follow up to mobilize as able. Patient still with safety concerns for bed mobility and transfers but making gains. Plan is for discharge home with HHPT when discharged Progression toward goals: ?      Improving appropriately and progressing toward goals ? Improving slowly and progressing toward goals ? Not making progress toward goals and plan of care will be adjusted PLAN: 
Patient continues to benefit from skilled intervention to address the above impairments. Continue treatment per established plan of care. Discharge Recommendations:  Home Health Further Equipment Recommendations for Discharge:  to be determined SUBJECTIVE:  
Patient stated ? Do I have to walk? .? The patient stated 2/3 back precautions. Reviewed all 3 with patient. OBJECTIVE DATA SUMMARY:  
Critical Behavior: 
Neurologic State: Alert, Eyes open spontaneously Orientation Level: Oriented X4 Cognition: Follows commands Safety/Judgement: Fall prevention Functional Mobility Training: 
Bed Mobility: Log   
  
  
  
  
  
Transfers: 
Sit to Stand: Minimum assistance;Assist x2(from chair) Balance: 
Sitting: Intact Standing: Impaired; With support Standing - Static: Fair;Constant support Standing - Dynamic : Fair;Constant support Ambulation/Gait Training: 
Distance (ft): 100 Feet (ft) Assistive Device: Walker, rolling;Brace/Splint Ambulation - Level of Assistance: Contact guard assistance Gait Abnormalities: Decreased step clearance;Shuffling gait Base of Support: Widened Speed/Jessica: Pace decreased (<100 feet/min) Stairs: Therapeutic Exercises:  
 
Pain: 
Pain Scale 1: Numeric (0 - 10) Pain Intensity 1: 7 Pain Location 1: Back Pain Orientation 1: Lower Pain Description 1: Aching Pain Intervention(s) 1: Medication (see MAR) Activity Tolerance: After treatment:  
?  Patient left in no apparent distress sitting up in chair ? Patient left in no apparent distress in bed 
? Call bell left within reach ? Nursing notified ? Caregiver present ? Bed alarm activated COMMUNICATION/COLLABORATION:  
 The patient?s plan of care was discussed with: Registered Nurse Marion Fuentes, PT, DPT Time Calculation: 24 mins

## 2019-05-10 NOTE — PROGRESS NOTES
Problem: Self Care Deficits Care Plan (Adult) Goal: *Acute Goals and Plan of Care (Insert Text) Description Occupational Therapy Goals Initiated 5/7/2019 Goals remain the same at Re-eval 
 
1. Patient will perform bathing with minimal assistance/contact guard assist using Reacher PRN within 7 days. 2.  Patient will perform lower body dressing with moderate assistance  using most appropriate DME within 7 days. 3.  Patient will toileting at supervision/set-up within 7 days. 4.  Patient will don/doff back brace at minimal assistance/contact guard assist within 7 days. 5.  Patient will verbalize/demonstrate 3/3 back precautions during ADL tasks without cues within 7 days. Outcome: Progressing Towards Goal 
 OCCUPATIONAL THERAPY TREATMENT Patient: Demarcus Gloria (53 y.o. female) Date: 5/10/2019 Diagnosis: Cervical stenosis of spinal canal [M48.02] Spinal stenosis of lumbar region at multiple levels [M48.061] S/P lumbar spinal fusion Procedure(s) (LRB): STAGED CASE PART 2 - L2-5 POSTERIOR DECOMPRESSION AND FUSION (N/A) 3 Days Post-Op Precautions: Spinal 
Chart, occupational therapy assessment, plan of care, and goals were reviewed. ASSESSMENT: 
Pt was cleared to be seen for therapy and all VSS and pt was supine in bed and laying on her side and had to be reminded that she was not allowed to twist her back. Pt was able to state 3/3 back precautions and but is having difficulty with following precautions. Pt was min assist of 2 for supine to sit and was min to mod assist of 1 to luis e her back brace. Pt was min to mod assist of 2 to stand and was min to CGA for tranfser to toietl and was able to stand and clean slef. Pt stood at the sink to pull her hair back and wash her hands, with CGA.   She was able to stand and have her back brace adjusted and then had to sit and rest.  OT issued pt AE for LB dressing and pt wanted to eat and then lay down and OT explained use of equipment to pt and son and pt did well with the instruction and will need some reminders at home . Recommend that pt return home with family and home care PT and assist from family. Progression toward goals: 
?       Improving appropriately and progressing toward goals ? Improving slowly and progressing toward goals ? Not making progress toward goals and plan of care will be adjusted PLAN: 
Patient continues to benefit from skilled intervention to address the above impairments. Continue treatment per established plan of care. Discharge Recommendations:  Home Health Further Equipment Recommendations for Discharge:  tbd SUBJECTIVE:  
Patient stated ? I want to lay back down but I can sit up for a little while. .? OBJECTIVE DATA SUMMARY:  
Cognitive/Behavioral Status: 
Neurologic State: Alert Orientation Level: Oriented X4 Cognition: Follows commands; Impaired decision making; Impulsive Perception: Appears intact Perseveration: No perseveration noted Safety/Judgement: Fall prevention Functional Mobility and Transfers for ADLs: 
Bed Mobility: 
 Min with Verbal cues Transfers: 
Sit to Stand: Minimum assistance;Assist x2(from chair) Balance: 
Sitting: Intact Standing: Impaired; With support Standing - Static: Fair;Constant support Standing - Dynamic : Fair;Constant support ADL Intervention: 
Feeding Feeding Assistance: Independent Grooming Grooming Assistance: Set-up; Supervision Washing Face: Set-up; Supervision Washing Hands: Set-up; Supervision Brushing Teeth: Set-up; Supervision Shaving: Supervision Upper Body Bathing Bathing Assistance: Set-up Position Performed: Seated in chair Lower Body Bathing Bathing Assistance: Maximum assistance Perineal  : Maximum assistance;Minimum assistance Position Performed: Standing Upper Body Dressing Assistance Dressing Assistance: Set-up Toileting Toileting Assistance: Contact guard assistance Bladder Hygiene: Contact guard assistance Bowel Hygiene: Contact guard assistance Cognitive Retraining Safety/Judgement: Fall prevention Pain: 
Pain Scale 1: Numeric (0 - 10) Pain Intensity 1: 7 Pain Location 1: Back Pain Orientation 1: Lower Pain Description 1: Aching Pain Intervention(s) 1: Medication (see MAR) Activity Tolerance:  
fair Please refer to the flowsheet for vital signs taken during this treatment. After treatment:  
? Patient left in no apparent distress sitting up in chair ? Patient left in no apparent distress in bed 
? Call bell left within reach ? Nursing notified ? Caregiver present ? Bed alarm activated COMMUNICATION/COLLABORATION:  
The patient?s plan of care was discussed with: Physical Therapist, Registered Nurse and lianne Ferreira OT Time Calculation: 31 mins

## 2019-05-10 NOTE — PROGRESS NOTES
Ortho / Neurosurgery NP Note POD# 4  s/p STAGED CASE PART 2 - L2-5 POSTERIOR DECOMPRESSION AND FUSION Pt seen with son at bedside and nurses after Fall while trying to walk to bathroom Pt resting in chair during my exam. Was assisted from floor by nurses prior to my arrival.  Pt and son report mechanical fall when rushing to get to bathroom from bed. No complaints. VSS Afebrile. Voiding status: +void Labs Lab Results Component Value Date/Time HGB 6.7 (L) 05/10/2019 03:44 AM  
  
Lab Results Component Value Date/Time INR 1.1 05/01/2019 07:44 AM  
  
 
Body mass index is 30.83 kg/m². : A BMI > 30 is classified as obesity and > 40 is classified as morbid obesity. [place] Dressing c.d.i Cryotherapy in place over incision Calves soft and supple; No pain with passive stretch Sensation and motor intact GHULAM No deformity or trauma noted SCDs for mechanical DVT proph while in bed PLAN: 
1) PT BID 
2) FALL today - XR spine ordered 3) Expected Acute blood loss post-op anemia - already received 1 unit PRBC two days ago, hgb back down and will order a second unit today 4) Readniess for discharge: 
   [x] Vital Signs stable  
 [] Hgb stable  
 [x] + Voiding  
 [x] Wound intact, drainage minimal  
 [x] Tolerating PO intake   
 [] Cleared by PT (OT if applicable) [] Stair training completed (if applicable) [] Independent / Contact Guard Assist (household distance) [] Bed mobility [] Car transfers  
  [] ADLs [x] Adequate pain control on oral medication alone Plan home with family tomorrow if Hgb better Sanjay Rodriguez, BLANCHE 
DNP, ACNP-BC, ONP-C

## 2019-05-10 NOTE — PROGRESS NOTES
Orthopedic End of Shift Note Bedside shift change report given to [address] [place] [address] Roly (oncoming nurse) by Jovanni Thompson RN (offgoing nurse). Report included the following information SBAR, Kardex, OR Summary, Procedure Summary, Intake/Output, MAR and Recent Results. POD#  2 Significant issues during shift:  Up with 1 assist doing well. Less pain meds needed more alert Coughing up white sputum Issues for Physician to address: Discharge planning Activity This Shift 
(check all that apply) [x] chair 
[] dangle 
 [] bathroom 
[] bedside commode [] hallway 
[] bedrest  
Nausea/Vomiting [] yes [x] no    
Voiding Status [x] void [] Lane [] I&O Cath Bowel Movements [] yes [x] no Foot Pumps or SCD [] yes [x] no refused Ice Pack [] yes    [x] no Incentive Spirometer [] yes [x] no Volume:   Cough and deep breathing Telemetry Monitoring   [] yes [x] no Rhythm:  
Supplemental O2 [x] yes  Used early in night shift [] no Sat off O2:   100

## 2019-05-10 NOTE — PROGRESS NOTES
Physical therapy Note: Followed up for PM session and patient currently receiving 1 uPRBC in bed. Will defer for now and follow up tomorrow.  
June Kiran, PT, DPT

## 2019-05-10 NOTE — PROGRESS NOTES
ORTHO POST OP SPINE PROGRESS NOTE May 10, 2019 Admit Date: 2019 Admit Diagnosis: Cervical stenosis of spinal canal [M48.02] Spinal stenosis of lumbar region at multiple levels [M48.061] Procedure: Procedure(s): STAGED CASE PART 2 - L2-5 POSTERIOR DECOMPRESSION AND FUSION Post Op day: 3 Days Post-Op Subjective:  
 
Cameron Kasper is a patient who has complaints Pain in the low back and right lower extremity 3 and 4 days respectively status post L2 through L5 posterior and lateral decompression fusion. Son at bedside. Tolerating p.o. and able to void. Previous hemoglobin has been 7.0 after 1 unit. At 6.7 this morning. Patient asymptomatic. Review of Systems: Pertinent items are noted in HPI. Objective:  
 
PT/OT:  
Distance Ambulated:          
Time Ambulated (min):       
Ambulation Response: Activity Response: Fairly tolerated Assistive Device:              Assistive Device: Fall prevention device, Gait belt, Walker (comment) Vital Signs:   
Blood pressure 112/67, pulse 96, temperature 98.1 °F (36.7 °C), resp. rate 18, height 5' 4.25\" (1.632 m), weight 82.1 kg (181 lb), SpO2 100 %, not currently breastfeeding. Temp (24hrs), Av.9 °F (36.6 °C), Min:97.7 °F (36.5 °C), Max:98.2 °F (36.8 °C) No intake/output data recorded.  1901 - 05/10 0700 In: -  
Out: 5836 [Urine:1800; Drains:5] LAB:   
Recent Labs 05/10/19 
0344 HGB 6.7* Wound/Drain Assessment: 
Drain:   
 
Dressing:  
 
Physical Exam: 
Neurological: no deficit Incision clean, dry, and intact and lila Holding suction 5/5 strength bilateral lower extremities with the exception of the bilateral hip flexors and knee extensors which are 4 and 3/5 respectively Assessment:  
  
Patient Active Problem List  
Diagnosis Code  LBP (low back pain) M54.5  DM (diabetes mellitus) ([facility] [address] E11.9  Hypercholesterolemia E78.00  
 Diabetes type 2, uncontrolled ([facility] [address] E11.65  
  Diabetic autonomic neuropathy associated with type 2 diabetes mellitus ([facility] [address] E11.43  
 Hypovitaminosis D E55.9  Leg pain, bilateral M79.604, M79.605  Essential hypertension I10  
 Hypothyroidism E03.9  Cervical stenosis of spinal canal M48.02  
 Cervical stenosis of spine M48.02  
 CAD (coronary artery disease) I25.10  
 S/P lumbar spinal fusion Z98.1  Spinal stenosis of lumbar region at multiple levels M48.061 Plan:  
 
Continue PT/OT/Rehab Discontinue: IV 
Consult: PT  and OT Discharge To: Home.  home health. Likely today

## 2019-05-10 NOTE — PROGRESS NOTES
Medicare pt has received, reviewed, and signed 2nd IM letter informing them of their right to appeal the discharge. Signed copy has been placed on pt bedside chart. Landon Moore [phone]

## 2019-05-11 VITALS
DIASTOLIC BLOOD PRESSURE: 63 MMHG | TEMPERATURE: 98.9 F | OXYGEN SATURATION: 92 % | HEIGHT: 64 IN | HEART RATE: 93 BPM | BODY MASS INDEX: 30.9 KG/M2 | RESPIRATION RATE: 18 BRPM | SYSTOLIC BLOOD PRESSURE: 102 MMHG | WEIGHT: 181 LBS

## 2019-05-11 LAB
ABO + RH BLD: NORMAL
BLD PROD TYP BPU: NORMAL
BLOOD GROUP ANTIBODIES SERPL: NORMAL
BPU ID: NORMAL
CROSSMATCH RESULT,%XM: NORMAL
GLUCOSE BLD STRIP.AUTO-MCNC: 102 MG/DL (ref 65–100)
GLUCOSE BLD STRIP.AUTO-MCNC: 170 MG/DL (ref 65–100)
GLUCOSE BLD STRIP.AUTO-MCNC: 73 MG/DL (ref 65–100)
GLUCOSE BLD STRIP.AUTO-MCNC: 75 MG/DL (ref 65–100)
GLUCOSE BLD STRIP.AUTO-MCNC: 78 MG/DL (ref 65–100)
HCT VFR BLD AUTO: 23.6 % (ref 35–47)
HGB BLD-MCNC: 7.8 G/DL (ref 11.5–16)
SERVICE CMNT-IMP: ABNORMAL
SERVICE CMNT-IMP: ABNORMAL
SERVICE CMNT-IMP: NORMAL
SPECIMEN EXP DATE BLD: NORMAL
STATUS OF UNIT,%ST: NORMAL
UNIT DIVISION, %UDIV: 0

## 2019-05-11 PROCEDURE — 74011250637 HC RX REV CODE- 250/637: Performed by: ORTHOPAEDIC SURGERY

## 2019-05-11 PROCEDURE — 82962 GLUCOSE BLOOD TEST: CPT

## 2019-05-11 PROCEDURE — 85018 HEMOGLOBIN: CPT

## 2019-05-11 PROCEDURE — 36415 COLL VENOUS BLD VENIPUNCTURE: CPT

## 2019-05-11 PROCEDURE — 77030036660

## 2019-05-11 PROCEDURE — 74011250636 HC RX REV CODE- 250/636: Performed by: NURSE PRACTITIONER

## 2019-05-11 RX ADMIN — ONDANSETRON 4 MG: 2 INJECTION INTRAMUSCULAR; INTRAVENOUS at 08:06

## 2019-05-11 RX ADMIN — CARVEDILOL 12.5 MG: 12.5 TABLET, FILM COATED ORAL at 08:07

## 2019-05-11 RX ADMIN — ACETAMINOPHEN 1000 MG: 500 TABLET ORAL at 12:29

## 2019-05-11 RX ADMIN — POLYETHYLENE GLYCOL 3350 17 G: 17 POWDER, FOR SOLUTION ORAL at 08:06

## 2019-05-11 RX ADMIN — RANITIDINE 300 MG: 15 SYRUP ORAL at 08:07

## 2019-05-11 RX ADMIN — GABAPENTIN 100 MG: 100 CAPSULE ORAL at 08:07

## 2019-05-11 RX ADMIN — SENNOSIDES AND DOCUSATE SODIUM 1 TABLET: 8.6; 5 TABLET ORAL at 08:07

## 2019-05-11 RX ADMIN — POTASSIUM CHLORIDE 10 MEQ: 750 TABLET, EXTENDED RELEASE ORAL at 08:07

## 2019-05-11 RX ADMIN — ACETAMINOPHEN 1000 MG: 500 TABLET ORAL at 07:15

## 2019-05-11 RX ADMIN — Medication 10 ML: at 07:16

## 2019-05-11 RX ADMIN — OXYCODONE HYDROCHLORIDE 15 MG: 5 TABLET ORAL at 12:29

## 2019-05-11 RX ADMIN — OXYCODONE HYDROCHLORIDE 15 MG: 5 TABLET ORAL at 01:05

## 2019-05-11 RX ADMIN — OXYCODONE HYDROCHLORIDE 15 MG: 5 TABLET ORAL at 08:07

## 2019-05-11 RX ADMIN — LEVOTHYROXINE SODIUM 112 MCG: 112 TABLET ORAL at 07:15

## 2019-05-11 RX ADMIN — CLOPIDOGREL BISULFATE 75 MG: 75 TABLET ORAL at 08:07

## 2019-05-11 RX ADMIN — CYCLOBENZAPRINE HYDROCHLORIDE 10 MG: 10 TABLET, FILM COATED ORAL at 10:06

## 2019-05-11 RX ADMIN — ASPIRIN 81 MG: 81 TABLET, COATED ORAL at 08:07

## 2019-05-11 RX ADMIN — DULOXETINE HYDROCHLORIDE 60 MG: 30 CAPSULE, DELAYED RELEASE ORAL at 08:07

## 2019-05-11 RX ADMIN — FAMOTIDINE 20 MG: 20 TABLET ORAL at 08:07

## 2019-05-11 RX ADMIN — Medication 16 G: at 07:53

## 2019-05-11 NOTE — PROGRESS NOTES
CM received call from nursing staff re: pt discharging today. CM completed chart review. Pt has been accepted to [facility] with home health. CM notified [facility] via [employer] of pt discharging today. Pt family to transport at discharge. No further CM needs notified at this time. CM will continue to remain available for support, discharge planning as needed. Jorje Vázquez, MSW, LSW Supervisee in Social Work [facility] [phone]

## 2019-05-11 NOTE — PROGRESS NOTES
ORTHO POST OP SPINE PROGRESS NOTE May 11, 2019 Admit Date: 2019 Admit Diagnosis: Cervical stenosis of spinal canal [M48.02] Spinal stenosis of lumbar region at multiple levels [M48.061] Procedure: Procedure(s): STAGED CASE PART 2 - L2-5 POSTERIOR DECOMPRESSION AND FUSION Post Op day: 4 Days Post-Op Subjective:  
 
Antonio Monroy is a patient who has complaints of improving lb and rle pain s/p L2-5 lateral and posterior fusion 5 and 4 days out respectively. tolerating po and able to void. .  
 
Review of Systems: Pertinent items are noted in HPI. Objective:  
 
PT/OT:  
Distance Ambulated:          
Time Ambulated (min):       
Ambulation Response: Activity Response: Fairly tolerated Assistive Device:              Assistive Device: Fall prevention device Vital Signs:   
Blood pressure 127/70, pulse 100, temperature 98.6 °F (37 °C), resp. rate 18, height 5' 4.25\" (1.632 m), weight 82.1 kg (181 lb), SpO2 99 %, not currently breastfeeding. Temp (24hrs), Av.3 °F (36.8 °C), Min:97.8 °F (36.6 °C), Max:98.8 °F (37.1 °C) No intake/output data recorded.  1901 -  0700 In: -  
Out: 8993 [COHLE:5435] LAB:   
Recent Labs 19 
0424 HGB 7.8* Wound/Drain Assessment: 
Drain:   
 
Dressing:  
 
Physical Exam: 
Neurological: no deficit Incision clean, dry, and intact 5/5 BLE Assessment:  
  
Patient Active Problem List  
Diagnosis Code  LBP (low back pain) M54.5  DM (diabetes mellitus) ([address] E11.9  Hypercholesterolemia E78.00  
 Diabetes type 2, uncontrolled ([address] E11.65  Diabetic autonomic neuropathy associated with type 2 diabetes mellitus ([address] E11.43  
 Hypovitaminosis D E55.9  Leg pain, bilateral M79.604, M79.605  Essential hypertension I10  
 Hypothyroidism E03.9  Cervical stenosis of spinal canal M48.02  
 Cervical stenosis of spine M48.02  
 CAD (coronary artery disease) I25.10  
 S/P lumbar spinal fusion Z98.1  Spinal stenosis of lumbar region at multiple levels M48.061 Plan:  
 
Continue PT/OT/Rehab Discontinue: IV 
Consult: PT  and OT Discharge To: HOME 
 hopefully home today however low bs last night. Hospitalist called. May d/c pending clearance. D/w nursing

## 2019-05-11 NOTE — PROGRESS NOTES
Orders received via telephone to discharge pt. Hospitalist Dr. Marbin Thompson had notified [facility] that not much would change medcation around and okay to discharge pt now.

## 2019-05-11 NOTE — PROGRESS NOTES
Problem: Falls - Risk of 
Goal: *Absence of Falls Description Document Ayaka Latin Fall Risk and appropriate interventions in the flowsheet. Outcome: Progressing Towards Goal 
  
Problem: Complex Spine Procedure:  Post Op Day 3 Goal: Activity/Safety Outcome: Progressing Towards Goal 
Goal: Medications Outcome: Progressing Towards Goal

## 2019-05-11 NOTE — PROGRESS NOTES
Dr. Godwin Laureano notified for hospitalist consult for hypoglycemia. Call back number [phone] given

## 2019-05-11 NOTE — PROGRESS NOTES
Bedside and Verbal shift change report given to Laya Rodrigez (oncoming nurse) by Jing Howard (offgoing nurse). Report included the following information SBAR, Kardex, OR Summary, Intake/Output, MAR, Accordion, Recent Results and Med Rec Status.

## 2019-05-11 NOTE — PROGRESS NOTES
Pt given education regarding discharge instructions, prescriptions, prescription literature, and extra dressings. Opportunities for questions given. PIV taken out with cath tip intact. Pt discharged with personal belongings, and daughter.

## 2019-05-11 NOTE — PROGRESS NOTES
Luke diggs, notified re: low blood sugar. Orders received to consult hospitalist, give lantus 9 units sc x1.

## 2019-05-15 ENCOUNTER — TELEPHONE (OUTPATIENT)
Dept: DIABETES SERVICES | Age: 64
End: 2019-05-15

## 2019-06-06 NOTE — CDMP QUERY
Patient admitted with spinal stenosis s/p fusion. Noted documentation of Expected Acute blood loss post-op anemia and anemia due to chronic disease. If possible, please document in progress notes and d/c summary if you are evaluating and /or treating any of the following: 
 
-Anemia due to acute blood loss 
-Anemia due to chronic blood loss 
-Other, please specify 
-Clinically unable to determine The medical record reflects the following: 
  Risk Factors: s/p fusion, ebl 200 Clinical Indicators: 5/10 Ortho NP note: Expected Acute blood loss post-op anemia - already received 1 unit PRBC two days ago, hgb back down and will order a second unit today 5/11 Ortho MD note: Pt has anemia due to chronic disease,  hgb 6.2 Treatment: 2u prbc Thanks, Jazmine Ramsay RN/CDMP

## 2019-06-11 NOTE — H&P
Pt has anemia of chronic disease. Please see extensive medical history below. Patient Active Problem List    Diagnosis Date Noted    S/P lumbar spinal fusion 05/06/2019    Spinal stenosis of lumbar region at multiple levels 05/06/2019    CAD (coronary artery disease)     Cervical stenosis of spine 10/31/2018    Cervical stenosis of spinal canal 10/30/2018    Hypothyroidism 08/20/2018    Diabetes type 2, uncontrolled ([facility] [address] 05/07/2015    Diabetic autonomic neuropathy associated with type 2 diabetes mellitus ([facility] [address] 05/07/2015    Hypovitaminosis D 05/07/2015    Leg pain, bilateral 05/07/2015    Essential hypertension 05/07/2015    Hypercholesterolemia 01/26/2015    LBP (low back pain) 01/06/2014    DM (diabetes mellitus) ([facility] [address] 01/06/2014     Past Medical History:   Diagnosis Date    Arthritis     CAD (coronary artery disease)     6 stents (CARMITA) [facility]/21/18 [facility]    Diabetes ([facility] [address]     Dizziness     Dyspepsia and other specified disorders of function of stomach     GERD (gastroesophageal reflux disease)     Headache(784.0)     Hx of cardiac cath 08/2018    after abnormal stress test 3/13/18    Hypertension     Loss of appetite     Musculoskeletal disorder     back pain    Other ill-defined conditions(799.89)     shingles    Thyroid disease     hypothyroidism      Past Surgical History:   Procedure Laterality Date    CARDIAC SURG PROCEDURE UNLIST      6 stents     HX GYN  1996, 1980 & 1981    hysterectomy & 2 c-sections    HX HEENT  1963    tonsils    HX ORTHOPAEDIC Bilateral     carpal tunnel release    HX ORTHOPAEDIC      neck surgery    HX OTHER SURGICAL  1976    sweat glands [facility] @ [facility]    HX OTHER SURGICAL  2015    abscess removed from right back    [place] DRAIN SKIN ABSCESS SIMPLE  1/9/12      Prior to Admission medications    Medication Sig Start Date End Date Taking? Authorizing Provider   potassium chloride (KLOR-CON) 10 mEq tablet Take 1 Tab by mouth three (3) times daily.  5/1/19 Yes Ximena Jennings MD   levothyroxine (SYNTHROID) 112 mcg tablet Take 1 Tab by mouth Daily (before breakfast). 19  Yes Ximena Jennings MD   raNITIdine (ZANTAC) 300 mg tab Take 1 Tab by mouth daily. 19  Yes Ximena Jennings MD   carvedilol (COREG) 3.125 mg tablet Take 12.5 mg by mouth two (2) times daily (with meals). 18  Yes Diandra Craig, NP   Blood-Glucose Meter (RELION PRIME METER) misc by Does Not Apply route. Yes Provider, Historical   insulin NPH/insulin regular (NOVOLIN 70/30 U-100 INSULIN) 100 unit/mL (70-30) injection by SubCUTAneous route. Indications: takes 17 units in the morning and 15 units at dinner   Yes Provider, Historical   metFORMIN (GLUCOPHAGE) 500 mg tablet Take 1 Tab by mouth two (2) times daily (with meals). 19   Ximena Jennings MD   rosuvastatin (CRESTOR) 20 mg tablet Take 1 Tab by mouth nightly. 19   Ximena Jennings MD   doxycycline (ADOXA) 100 mg tablet Take 100 mg by mouth two (2) times a day. Provider, Historical   valerian root 450 mg cap Take 2 Tabs by mouth nightly. Provider, Historical   glimepiride (AMARYL) 4 mg tablet Take 1 Tab by mouth every morning. 19   Ximena Jennings MD   clopidogrel (PLAVIX) 75 mg tab Take 75 mg by mouth daily. Provider, Historical   DULoxetine (CYMBALTA) 60 mg capsule Take 60 mg by mouth daily. Provider, Historical   tiZANidine (ZANAFLEX) 4 mg tablet Take 4 mg by mouth three (3) times daily as needed. Provider, Historical   aspirin delayed-release 81 mg tablet Take 81 mg by mouth daily.     Other, MD [place]      Allergies   Allergen Reactions    Penicillins Rash      Social History     Tobacco Use    Smoking status: Former Smoker     Packs/day: 0.50     Years: 25.00     Pack years: 12.50     Last attempt to quit: 1999     Years since quittin.4    Smokeless tobacco: Never Used   Substance Use Topics    Alcohol use: Yes     Comment: very rare      Family History   Problem Relation Age of Onset    Diabetes Mother     Hypertension Mother     Hypertension Father     Diabetes Father     Stroke Father     Heart Attack Father     Diabetes Sister     Cancer Sister         lung

## 2019-11-05 ENCOUNTER — HOSPITAL ENCOUNTER (OUTPATIENT)
Dept: MRI IMAGING | Age: 64
Discharge: HOME | End: 2019-11-05
Attending: ORTHOPAEDIC SURGERY
Payer: MEDICARE

## 2019-11-05 DIAGNOSIS — M47.812 CERVICAL SPONDYLOSIS WITHOUT MYELOPATHY: ICD-10-CM

## 2019-11-05 DIAGNOSIS — G99.2 STENOSIS OF CERVICAL SPINE WITH MYELOPATHY (HCC): ICD-10-CM

## 2019-11-05 DIAGNOSIS — M54.12 CERVICAL RADICULOPATHY: ICD-10-CM

## 2019-11-05 DIAGNOSIS — M48.02 SPINAL STENOSIS IN CERVICAL REGION: ICD-10-CM

## 2019-11-05 DIAGNOSIS — M54.2 CERVICALGIA: ICD-10-CM

## 2019-11-05 DIAGNOSIS — M48.02 STENOSIS OF CERVICAL SPINE WITH MYELOPATHY (HCC): ICD-10-CM

## 2019-11-05 DIAGNOSIS — M50.20 DISPLACEMENT OF CERVICAL INTERVERTEBRAL DISC WITHOUT MYELOPATHY: ICD-10-CM

## 2019-11-05 PROCEDURE — 72141 MRI NECK SPINE W/O DYE: CPT

## 2020-06-25 ENCOUNTER — HOSPITAL ENCOUNTER (OUTPATIENT)
Dept: GENERAL RADIOLOGY | Age: 65
Discharge: HOME | End: 2020-06-25
Payer: MEDICARE

## 2020-06-25 DIAGNOSIS — I25.118 ATHSCL HEART DISEASE OF NATIVE COR ART W OTH ANG PCTRS (HCC): ICD-10-CM

## 2020-06-25 PROCEDURE — 71046 X-RAY EXAM CHEST 2 VIEWS: CPT

## 2020-06-26 ENCOUNTER — OFFICE VISIT (OUTPATIENT)
Dept: URGENT CARE | Age: 65
End: 2020-06-26

## 2020-06-26 VITALS — RESPIRATION RATE: 17 BRPM | OXYGEN SATURATION: 96 % | TEMPERATURE: 98.4 F | HEART RATE: 84 BPM

## 2020-06-26 DIAGNOSIS — Z20.822 ENCOUNTER FOR LABORATORY TESTING FOR COVID-19 VIRUS: Primary | ICD-10-CM

## 2020-07-02 LAB — SARS-COV-2, NAA: NOT DETECTED

## 2021-01-08 ENCOUNTER — TRANSCRIBE ORDER (OUTPATIENT)
Dept: SCHEDULING | Age: 66
End: 2021-01-08

## 2021-01-08 DIAGNOSIS — R59.9 SWELLING OF LYMPH NODES: Primary | ICD-10-CM

## 2021-09-24 ENCOUNTER — HOSPITAL ENCOUNTER (OUTPATIENT)
Dept: PREADMISSION TESTING | Age: 66
Discharge: HOME | End: 2021-09-24
Payer: MEDICARE

## 2021-09-24 PROCEDURE — U0003 INFECTIOUS AGENT DETECTION BY NUCLEIC ACID (DNA OR RNA); SEVERE ACUTE RESPIRATORY SYNDROME CORONAVIRUS 2 (SARS-COV-2) (CORONAVIRUS DISEASE [COVID-19]), AMPLIFIED PROBE TECHNIQUE, MAKING USE OF HIGH THROUGHPUT TECHNOLOGIES AS DESCRIBED BY CMS-2020-01-R: HCPCS

## 2021-09-25 LAB
SARS-COV-2, XPLCVT: NOT DETECTED
SOURCE, COVRS: NORMAL

## 2021-09-29 ENCOUNTER — ANESTHESIA EVENT (OUTPATIENT)
Dept: ENDOSCOPY | Age: 66
End: 2021-09-29
Payer: MEDICARE

## 2021-09-29 ENCOUNTER — HOSPITAL ENCOUNTER (OUTPATIENT)
Age: 66
Setting detail: OUTPATIENT SURGERY
Discharge: HOME | End: 2021-09-29
Attending: INTERNAL MEDICINE | Admitting: INTERNAL MEDICINE
Payer: MEDICARE

## 2021-09-29 ENCOUNTER — ANESTHESIA (OUTPATIENT)
Dept: ENDOSCOPY | Age: 66
End: 2021-09-29
Payer: MEDICARE

## 2021-09-29 VITALS
RESPIRATION RATE: 22 BRPM | WEIGHT: 189.2 LBS | OXYGEN SATURATION: 99 % | TEMPERATURE: 98 F | HEIGHT: 64 IN | DIASTOLIC BLOOD PRESSURE: 68 MMHG | HEART RATE: 85 BPM | SYSTOLIC BLOOD PRESSURE: 116 MMHG | BODY MASS INDEX: 32.3 KG/M2

## 2021-09-29 LAB
GLUCOSE BLD STRIP.AUTO-MCNC: 192 MG/DL (ref 65–117)
SERVICE CMNT-IMP: ABNORMAL

## 2021-09-29 PROCEDURE — 82962 GLUCOSE BLOOD TEST: CPT

## 2021-09-29 PROCEDURE — 2709999900 HC NON-CHARGEABLE SUPPLY: Performed by: INTERNAL MEDICINE

## 2021-09-29 PROCEDURE — 74011250636 HC RX REV CODE- 250/636: Performed by: NURSE ANESTHETIST, CERTIFIED REGISTERED

## 2021-09-29 PROCEDURE — 76060000031 HC ANESTHESIA FIRST 0.5 HR: Performed by: INTERNAL MEDICINE

## 2021-09-29 PROCEDURE — 74011250636 HC RX REV CODE- 250/636: Performed by: INTERNAL MEDICINE

## 2021-09-29 PROCEDURE — 74011000250 HC RX REV CODE- 250: Performed by: NURSE ANESTHETIST, CERTIFIED REGISTERED

## 2021-09-29 PROCEDURE — 76040000019: Performed by: INTERNAL MEDICINE

## 2021-09-29 PROCEDURE — 77030019988 HC FCPS ENDOSC DISP BSC -B: Performed by: INTERNAL MEDICINE

## 2021-09-29 PROCEDURE — 88305 TISSUE EXAM BY PATHOLOGIST: CPT

## 2021-09-29 RX ORDER — NALOXONE HYDROCHLORIDE 0.4 MG/ML
0.4 INJECTION, SOLUTION INTRAMUSCULAR; INTRAVENOUS; SUBCUTANEOUS
Status: DISCONTINUED | OUTPATIENT
Start: 2021-09-29 | End: 2021-09-29 | Stop reason: HOSPADM

## 2021-09-29 RX ORDER — SODIUM CHLORIDE 9 MG/ML
75 INJECTION, SOLUTION INTRAVENOUS CONTINUOUS
Status: DISCONTINUED | OUTPATIENT
Start: 2021-09-29 | End: 2021-09-29 | Stop reason: HOSPADM

## 2021-09-29 RX ORDER — DEXTROMETHORPHAN/PSEUDOEPHED 2.5-7.5/.8
1.2 DROPS ORAL
Status: DISCONTINUED | OUTPATIENT
Start: 2021-09-29 | End: 2021-09-29 | Stop reason: HOSPADM

## 2021-09-29 RX ORDER — SODIUM CHLORIDE 0.9 % (FLUSH) 0.9 %
5-40 SYRINGE (ML) INJECTION EVERY 8 HOURS
Status: DISCONTINUED | OUTPATIENT
Start: 2021-09-29 | End: 2021-09-29 | Stop reason: HOSPADM

## 2021-09-29 RX ORDER — ATROPINE SULFATE 0.1 MG/ML
0.5 INJECTION INTRAVENOUS
Status: DISCONTINUED | OUTPATIENT
Start: 2021-09-29 | End: 2021-09-29 | Stop reason: HOSPADM

## 2021-09-29 RX ORDER — EPINEPHRINE 0.1 MG/ML
1 INJECTION INTRACARDIAC; INTRAVENOUS
Status: DISCONTINUED | OUTPATIENT
Start: 2021-09-29 | End: 2021-09-29 | Stop reason: HOSPADM

## 2021-09-29 RX ORDER — SODIUM CHLORIDE 0.9 % (FLUSH) 0.9 %
5-40 SYRINGE (ML) INJECTION AS NEEDED
Status: DISCONTINUED | OUTPATIENT
Start: 2021-09-29 | End: 2021-09-29 | Stop reason: HOSPADM

## 2021-09-29 RX ORDER — FLUMAZENIL 0.1 MG/ML
0.2 INJECTION INTRAVENOUS
Status: DISCONTINUED | OUTPATIENT
Start: 2021-09-29 | End: 2021-09-29 | Stop reason: HOSPADM

## 2021-09-29 RX ORDER — LIDOCAINE HYDROCHLORIDE 20 MG/ML
INJECTION, SOLUTION EPIDURAL; INFILTRATION; INTRACAUDAL; PERINEURAL AS NEEDED
Status: DISCONTINUED | OUTPATIENT
Start: 2021-09-29 | End: 2021-09-29 | Stop reason: HOSPADM

## 2021-09-29 RX ORDER — PROPOFOL 10 MG/ML
INJECTION, EMULSION INTRAVENOUS AS NEEDED
Status: DISCONTINUED | OUTPATIENT
Start: 2021-09-29 | End: 2021-09-29 | Stop reason: HOSPADM

## 2021-09-29 RX ADMIN — LIDOCAINE HYDROCHLORIDE 100 MG: 20 INJECTION, SOLUTION EPIDURAL; INFILTRATION; INTRACAUDAL; PERINEURAL at 12:33

## 2021-09-29 RX ADMIN — PROPOFOL 50 MG: 10 INJECTION, EMULSION INTRAVENOUS at 12:37

## 2021-09-29 RX ADMIN — PROPOFOL 100 MG: 10 INJECTION, EMULSION INTRAVENOUS at 12:33

## 2021-09-29 RX ADMIN — SODIUM CHLORIDE 75 ML/HR: 9 INJECTION, SOLUTION INTRAVENOUS at 12:22

## 2021-09-29 NOTE — ROUTINE PROCESS
Endoscope was pre-cleaned at the bedside immediately following procedure by Durwood Lombard     Medications     lidocaine (PF) 2% (mg)     Date/Time   Rate/Dose/Volume Action Route Admin User Audit   09/29/21  1233  100 mg Given IntraVENous Todd Lamar CRNA              propofol 10 mg/mL (mg)     Date/Time   Rate/Dose/Volume Action Route Admin User Audit   09/29/21  1233  100 mg Given IntraVENous Todd Willard, CRNA     1237  50 mg Given IntraVENous Todd Lamar CRNA              0.9% sodium chloride infusion (mL/hr)     Date/Time   Rate/Dose/Volume Action Route Admin User Audit   09/29/21  [address] [place] [address]  Dawn Contreras MD    Comment: Switch to gravity      1230   Restarted IntraVENous Dawn Contreras MD edited    1233  75 mL/hr Rate Verify IntraVENous Dawn Contreras MD     1240  200 mL Anesthesia Volume Adjustment IntraVENous Todd Lamar CRNA                  .

## 2021-09-29 NOTE — DISCHARGE INSTRUCTIONS
Leo Marques  [id number]  1955    EGD DISCHARGE INSTRUCTIONS  Discomfort:  Sore throat- throat lozenges or warm salt water gargle  redness at IV site- apply warm compress to area; if redness or soreness persist- contact your physician  Gaseous discomfort- walking, belching will help relieve any discomfort  You may not operate a vehicle for 12 hours  You may not engage in an occupation involving machinery or appliances for rest of today  You may not drink alcoholic beverages for at least 12 hours  Avoid making any critical decisions for at least 24 hour  DIET  You may resume your regular diet - however -  remember your colon is empty and a heavy meal will produce gas. Avoid these foods:  vegetables, fried / greasy foods, carbonated drinks    MEDICATIONS:  Per Medication Reconciliation      ACTIVITY  You may resume your normal daily activities until tomorrow AM;  Spend the remainder of the day resting -  avoid any strenuous activity. CALL M.D. ANY SIGN OF   Increasing pain, nausea, vomiting  Abdominal distension (swelling)  New increased bleeding (oral or rectal)  Fever (chills)  Pain in chest area  Bloody discharge from nose or mouth  Shortness of breath    You may not take any Advil, Aspirin, Ibuprofen, Motrin, Aleve, or Goodys for 10 days, ONLY  Tylenol as needed for pain. IMPRESSION:  Impression:    1. Normal proximal and mid esophagus  2. Small, sliding hiatal hernia  3. Mild to moderate diffuse atrophic gastritis. Biopsied  4. Stomach otherwise normal, including retroflexion  5. Normal duodenal bulb and 2nd/3rd portion of the duodenum. Biopsied    Recommendations:  1. Follow up pathology  2.  May need colonoscopy if biopsies are unremarkable (nothing pt has had a recent negative Cologuard and FOBT)    Follow-up Instructions:   Call Dr. Court Smith for the results of procedure / biopsy in 7-10 days   Telephone # [phone]    Robert Billy MD

## 2021-09-29 NOTE — PROCEDURES
NAME:  Diane Ch   :   1955   MRN:   [id number]     Date/Time:  2021 12:41 PM    Esophagogastroduodenoscopy (EGD) Procedure Note    Procedure: Esophagogastroduodenoscopy with biopsy    Indication:  Iron deficiency anemia  Pre-operative Diagnosis: see indication above  Post-operative Diagnosis: see findings below  :  Federica Barriga MD  Referring Provider:   --Genevieve Escoto NP    Exam:  Airway: clear, no airway problems anticipated  Heart: RRR, without gallops or rubs  Lungs: clear bilaterally without wheezes, crackles, or rhonchi  Abdomen: soft, nontender, nondistended, bowel sounds present  Mental Status: awake, alert and oriented to person, place and time     Anethesia/Sedation:  MAC anesthesia Propofol  Procedure Details   After informed consent was obtained for the procedure, with all risks and benefits of procedure explained the patient was taken to the endoscopy suite and placed in the left lateral decubitus position. Following sequential administration of sedation as per above, the [id number] gastroscope was inserted into the mouth and advanced under direct vision to third portion of the duodenum. A careful inspection was made as the gastroscope was withdrawn, including a retroflexed view of the proximal stomach; findings and interventions are described below. Findings:   1. Normal proximal and mid esophagus  2. Small, sliding hiatal hernia  3. Mild to moderate diffuse atrophic gastritis. Biopsied  4. Stomach otherwise normal, including retroflexion  5. Normal duodenal bulb and 2nd/3rd portion of the duodenum. Biopsied    Therapies:  1. Biopsies    Specimens: 1. Duodenal 2. Gastric    EBL:  None. Complications:   None; patient tolerated the procedure well. Impression:    1. Normal proximal and mid esophagus  2. Small, sliding hiatal hernia  3. Mild to moderate diffuse atrophic gastritis. Biopsied  4.  Stomach otherwise normal, including retroflexion  5. Normal duodenal bulb and 2nd/3rd portion of the duodenum. Biopsied    Recommendations:  1. Follow up pathology  2.  May need colonoscopy if biopsies are unremarkable (nothing pt has had a recent negative Cologuard and FOBT)    Discharge disposition:  Home in the company of  when able to ambulate    Tom Edwards MD

## 2021-09-29 NOTE — H&P
Gastroenterology Outpatient History and Physical    Patient: Kianna Campa    Physician: Pilar Wilson MD    Chief Complaint: ISAI  History of Present Illness: No other complaints    History:  Past Medical History:   Diagnosis Date    Arthritis     CAD (coronary artery disease)     6 stents (CARMITA) [facility]    Diabetes ([facility] [address]     Dizziness     years ago     Dyspepsia and other specified disorders of function of stomach     GERD (gastroesophageal reflux disease)     Hx of cardiac cath 2018    after abnormal stress test 3/13/18    Hypertension complicating diabetes ([facility] [address]     Loss of appetite     Musculoskeletal disorder     back pain    Other ill-defined conditions(799.89)     shingles    Thyroid disease     hypothyroidism      Past Surgical History:   Procedure Laterality Date     [facility] [place]    hysterectomy & 2 c-sections    HX HEENT  1963    tonsils    HX ORTHOPAEDIC Bilateral     carpal tunnel release    HX ORTHOPAEDIC      neck surgery fusion and lower back fusion    HX OTHER SURGICAL      sweat glands [facility] @ [facility]    HX OTHER SURGICAL      abscess removed from right back    [facility] CARDIAC SURG PROCEDURE UNLIST  2018    6 stents     [facility] DRAIN SKIN ABSCESS SIMPLE  12      Social History     Socioeconomic History    Marital status:      Spouse name: Not on file    Number of children: Not on file    Years of education: Not on file    Highest education level: Not on file   Tobacco Use    Smoking status: Former Smoker     Packs/day: 0.50     Years: 25.00     Pack years: 12.50     Quit date: 1999     Years since quittin.7    Smokeless tobacco: Never Used   Substance and Sexual Activity    Alcohol use: Yes     Comment: very rare    Drug use: No    Sexual activity: Never   Social History Narrative    Retired but does part time with [employer].      Social Determinants of Health     Financial Resource Strain:     Difficulty of Paying Living Expenses:    Food Insecurity:     Worried About Running Out of Food in the Last Year:     [address] in the Last Year:    Transportation Needs:     Lack of Transportation (Medical):  Lack of Transportation (Non-Medical):    Physical Activity:     Days of Exercise per Week:     Minutes of Exercise per Session:    Stress:     Feeling of Stress :    Social Connections:     Frequency of Communication with Friends and Family:     Frequency of Social Gatherings with Friends and Family:     Attends Taoist Services:     Active Member of Clubs or Organizations:     Attends Club or Organization Meetings:     Marital Status:       Family History   Problem Relation Age of Onset    Diabetes Mother     Hypertension Mother     Hypertension Father     Diabetes Father     Stroke Father     Heart Attack Father     Diabetes Sister     Cancer Sister         lung      Patient Active Problem List   Diagnosis Code    LBP (low back pain) M54.5    DM (diabetes mellitus) ([facility] [address] E11.9    Hypercholesterolemia E78.00    Diabetes type 2, uncontrolled ([facility] [address] E11.65    Diabetic autonomic neuropathy associated with type 2 diabetes mellitus ([facility] [address] E11.43    Hypovitaminosis D E55.9    Leg pain, bilateral M79.604, M79.605    Essential hypertension I10    Hypothyroidism E03.9    Cervical stenosis of spinal canal M48.02    Cervical stenosis of spine M48.02    CAD (coronary artery disease) I25.10    S/P lumbar spinal fusion Z98.1    Spinal stenosis of lumbar region at multiple levels M48.061    Hypertension complicating diabetes ([facility] [address] E11.59, I15.2       Allergies: Allergies   Allergen Reactions    Penicillins Rash     Medications:   Prior to Admission medications    Medication Sig Start Date End Date Taking? Authorizing Provider   insulin NPH/insulin regular (NovoLIN 70/30 U-100 Insulin) 100 unit/mL (70-30) injection by SubCUTAneous route. 12units in AM and 10 units at dinner.    Yes Provider, Historical   famotidine (PEPCID) 40 mg tablet Take 1 Tab by mouth daily. 10/12/20  Yes Roby Arce MD   rosuvastatin (CRESTOR) 20 mg tablet Take 1 Tab by mouth nightly. 9/17/20  Yes Roby Arce MD   metFORMIN (GLUCOPHAGE) 500 mg tablet Take 1 Tab by mouth two (2) times daily (with meals). 6/24/20  Yes Roby Arce MD   levothyroxine (SYNTHROID) 112 mcg tablet Take in am 5 days a week  Patient taking differently: Take in am 7 days a week 6/24/20  Yes Roby Arce MD   flash glucose sensor (FREESTYLE TRES 14 DAY SENSOR) kit Use to check blood sugar. DXE11.9 7/19/19  Yes Sofiya Matt MD   carvedilol (COREG) 3.125 mg tablet Take 12.5 mg by mouth two (2) times daily (with meals). 11/1/18  Yes Gabriella Monroy NP   Blood-Glucose Meter (RELION PRIME METER) misc by Does Not Apply route. Yes Provider, Historical   aspirin delayed-release 81 mg tablet Take 81 mg by mouth daily. Yes Other, MD [place]   tiZANidine (ZANAFLEX) 4 mg tablet Take 1 tablet by mouth three times daily as needed for muscle spasm 2/17/21   Roby Arce MD     Physical Exam:   Vital Signs: Blood pressure (!) 141/78, pulse 93, temperature 98 °F (36.7 °C), resp. rate 22, height 5' 4\" (1.626 m), weight 85.8 kg (189 lb 3.2 oz), SpO2 100 %, not currently breastfeeding.   General: well developed, well nourished   HEENT: unremarkable   Heart: regular rhythm no mumur    Lungs: clear   Abdominal:  benign   Neurological: unremarkable   Extremities: no edema     Findings/Diagnosis: ISAI  Plan of Care/Planned Procedure: EGD with conscious/deep sedation    Signed:  Spencer Hodgson MD 9/29/2021

## 2021-09-29 NOTE — ROUTINE PROCESS
Kay Claude  1955  [id number]    Situation:  Verbal report received from: Milena Keith  Procedure: Procedure(s):  ESOPHAGOGASTRODUODENOSCOPY (EGD)  ESOPHAGOGASTRODUODENAL (EGD) BIOPSY    Background:    Preoperative diagnosis: ANEMIA  Postoperative diagnosis: egd - hiatal hernia, aptrophic gastritis    :  Dr. Karen Nash  Assistant(s): Endoscopy Technician-1: Curt Sutherland  Endoscopy RN-1: Marc Henderson RN    Specimens:   ID Type Source Tests Collected by Time Destination   1 : Duodenum BX Preservative Duodenum  Camila Weiner MD 9/29/2021 1236 Pathology   2 : Gastric BX Preservative Gastric  Camila Weiner MD 9/29/2021 1237 Pathology     H. Pylori  no    Assessment:  Intra-procedure medications     Anesthesia gave intra-procedure sedation and medications, see anesthesia flow sheet yes    Intravenous fluids: NS@ KVO     Vital signs stable     Abdominal assessment: round and soft     Recommendation:  Discharge patient per MD order.   Return to floor  Family or Friend   Permission to share finding with family or friend yes

## 2021-09-29 NOTE — ANESTHESIA PREPROCEDURE EVALUATION
Anesthetic History   No history of anesthetic complications            Review of Systems / Medical History  Patient summary reviewed, nursing notes reviewed and pertinent labs reviewed    Pulmonary          Smoker      Comments: Former smoker 12.5 pack years   Neuro/Psych         Headaches     Cardiovascular  Within defined limits  Hypertension          CAD and cardiac stents    Exercise tolerance: >4 METS  Comments: 6 stents   GI/Hepatic/Renal  Within defined limits   GERD           Endo/Other    Diabetes: poorly controlled, type 2, using insulin  Hypothyroidism  Obesity, arthritis and anemia     Other Findings   Comments: Spinal stenosis      Hoarse since last surgery in October           Physical Exam    Airway  Mallampati: II  TM Distance: 4 - 6 cm  Neck ROM: normal range of motion   Mouth opening: Normal     Cardiovascular  Regular rate and rhythm,  S1 and S2 normal,  no murmur, click, rub, or gallop             Dental    Dentition: Poor dentition     Pulmonary  Breath sounds clear to auscultation               Abdominal  GI exam deferred       Other Findings            Anesthetic Plan    ASA: 3  Anesthesia type: MAC          Induction: Intravenous  Anesthetic plan and risks discussed with: Patient

## 2021-09-29 NOTE — ANESTHESIA POSTPROCEDURE EVALUATION
Procedure(s):  ESOPHAGOGASTRODUODENOSCOPY (EGD)  ESOPHAGOGASTRODUODENAL (EGD) BIOPSY. MAC    Anesthesia Post Evaluation      Multimodal analgesia: multimodal analgesia used between 6 hours prior to anesthesia start to PACU discharge  Patient location during evaluation: PACU  Patient participation: complete - patient participated  Level of consciousness: sleepy but conscious  Pain score: 1  Pain management: adequate  Airway patency: patent  Anesthetic complications: no  Cardiovascular status: acceptable  Respiratory status: acceptable  Hydration status: acceptable  Comments: +Post-Anesthesia Evaluation and Assessment    Patient: Carol Guy MRN: [id number]  SSN: [id number]   YOB: 1955  Age: 74 y.o. Sex: female      Cardiovascular Function/Vital Signs    /61   Pulse 87   Temp 36.7 °C (98 °F)   Resp 16   Ht 5' 4\" (1.626 m)   Wt 85.8 kg (189 lb 3.2 oz)   SpO2 98%   Breastfeeding No   BMI 32.48 kg/m²     Patient is status post Procedure(s):  ESOPHAGOGASTRODUODENOSCOPY (EGD)  ESOPHAGOGASTRODUODENAL (EGD) BIOPSY. Nausea/Vomiting: Controlled. Postoperative hydration reviewed and adequate. Pain:  Pain Scale 1: Visual (09/29/21 1243)  Pain Intensity 1: 0 (09/29/21 1243)   Managed. Neurological Status: At baseline. Mental Status and Level of Consciousness: Arousable. Pulmonary Status:   O2 Device: None (Room air) (09/29/21 1243)   Adequate oxygenation and airway patent. Complications related to anesthesia: None    Post-anesthesia assessment completed. No concerns. Signed By: Carlos Almaraz MD    9/29/2021  Post anesthesia nausea and vomiting:  controlled  Final Post Anesthesia Temperature Assessment:  Normothermia (36.0-37.5 degrees C)      INITIAL Post-op Vital signs: No vitals data found for the desired time range.

## 2021-10-07 ENCOUNTER — TELEPHONE (OUTPATIENT)
Dept: ONCOLOGY | Age: 66
End: 2021-10-07

## 2021-10-15 ENCOUNTER — TELEPHONE (OUTPATIENT)
Dept: ONCOLOGY | Age: 66
End: 2021-10-15

## 2021-10-15 NOTE — TELEPHONE ENCOUNTER
Call placed to pt. Pt informed we need to move her appt. Pt stated \"I feel terrible. \" Nurse asked her symptoms. Pt stated \"I feel terrible and I was hoping for some relief. \" Pt advised to call her PCP. Pt thanked nurse and hung up the phone without rescheduling her appt.

## 2021-10-25 ENCOUNTER — OFFICE VISIT (OUTPATIENT)
Dept: ONCOLOGY | Age: 66
End: 2021-10-25
Payer: MEDICARE

## 2021-10-25 VITALS
OXYGEN SATURATION: 99 % | SYSTOLIC BLOOD PRESSURE: 127 MMHG | TEMPERATURE: 98.7 F | WEIGHT: 189 LBS | BODY MASS INDEX: 32.27 KG/M2 | HEART RATE: 87 BPM | HEIGHT: 64 IN | DIASTOLIC BLOOD PRESSURE: 79 MMHG | RESPIRATION RATE: 18 BRPM

## 2021-10-25 DIAGNOSIS — D64.9 NORMOCYTIC ANEMIA: Primary | ICD-10-CM

## 2021-10-25 PROBLEM — D50.9 IRON DEFICIENCY ANEMIA: Status: ACTIVE | Noted: 2021-10-25

## 2021-10-25 PROCEDURE — G8536 NO DOC ELDER MAL SCRN: HCPCS | Performed by: STUDENT IN AN ORGANIZED HEALTH CARE EDUCATION/TRAINING PROGRAM

## 2021-10-25 PROCEDURE — G8752 SYS BP LESS 140: HCPCS | Performed by: STUDENT IN AN ORGANIZED HEALTH CARE EDUCATION/TRAINING PROGRAM

## 2021-10-25 PROCEDURE — 1101F PT FALLS ASSESS-DOCD LE1/YR: CPT | Performed by: STUDENT IN AN ORGANIZED HEALTH CARE EDUCATION/TRAINING PROGRAM

## 2021-10-25 PROCEDURE — 99204 OFFICE O/P NEW MOD 45 MIN: CPT | Performed by: STUDENT IN AN ORGANIZED HEALTH CARE EDUCATION/TRAINING PROGRAM

## 2021-10-25 PROCEDURE — G8432 DEP SCR NOT DOC, RNG: HCPCS | Performed by: STUDENT IN AN ORGANIZED HEALTH CARE EDUCATION/TRAINING PROGRAM

## 2021-10-25 PROCEDURE — 1090F PRES/ABSN URINE INCON ASSESS: CPT | Performed by: STUDENT IN AN ORGANIZED HEALTH CARE EDUCATION/TRAINING PROGRAM

## 2021-10-25 PROCEDURE — G8754 DIAS BP LESS 90: HCPCS | Performed by: STUDENT IN AN ORGANIZED HEALTH CARE EDUCATION/TRAINING PROGRAM

## 2021-10-25 PROCEDURE — 3017F COLORECTAL CA SCREEN DOC REV: CPT | Performed by: STUDENT IN AN ORGANIZED HEALTH CARE EDUCATION/TRAINING PROGRAM

## 2021-10-25 PROCEDURE — G8427 DOCREV CUR MEDS BY ELIG CLIN: HCPCS | Performed by: STUDENT IN AN ORGANIZED HEALTH CARE EDUCATION/TRAINING PROGRAM

## 2021-10-25 PROCEDURE — G8400 PT W/DXA NO RESULTS DOC: HCPCS | Performed by: STUDENT IN AN ORGANIZED HEALTH CARE EDUCATION/TRAINING PROGRAM

## 2021-10-25 PROCEDURE — G8417 CALC BMI ABV UP PARAM F/U: HCPCS | Performed by: STUDENT IN AN ORGANIZED HEALTH CARE EDUCATION/TRAINING PROGRAM

## 2021-10-25 PROCEDURE — G9899 SCRN MAM PERF RSLTS DOC: HCPCS | Performed by: STUDENT IN AN ORGANIZED HEALTH CARE EDUCATION/TRAINING PROGRAM

## 2021-10-25 RX ORDER — HEPARIN 100 UNIT/ML
300-500 SYRINGE INTRAVENOUS AS NEEDED
Status: CANCELLED
Start: 2021-11-09

## 2021-10-25 RX ORDER — ONDANSETRON 2 MG/ML
8 INJECTION INTRAMUSCULAR; INTRAVENOUS AS NEEDED
Status: CANCELLED | OUTPATIENT
Start: 2021-11-02

## 2021-10-25 RX ORDER — DEXTROMETHORPHAN HYDROBROMIDE, GUAIFENESIN 5; 100 MG/5ML; MG/5ML
650 LIQUID ORAL
COMMUNITY

## 2021-10-25 RX ORDER — SODIUM CHLORIDE 0.9 % (FLUSH) 0.9 %
10 SYRINGE (ML) INJECTION AS NEEDED
Status: CANCELLED | OUTPATIENT
Start: 2021-11-09 | End: 2021-11-10

## 2021-10-25 RX ORDER — DIPHENHYDRAMINE HYDROCHLORIDE 50 MG/ML
25 INJECTION, SOLUTION INTRAMUSCULAR; INTRAVENOUS AS NEEDED
Status: CANCELLED
Start: 2021-11-09

## 2021-10-25 RX ORDER — SODIUM CHLORIDE 9 MG/ML
25 INJECTION, SOLUTION INTRAVENOUS CONTINUOUS
Status: CANCELLED | OUTPATIENT
Start: 2021-11-02 | End: 2021-11-03

## 2021-10-25 RX ORDER — ACETAMINOPHEN 325 MG/1
650 TABLET ORAL AS NEEDED
Status: CANCELLED
Start: 2021-11-09

## 2021-10-25 RX ORDER — SODIUM CHLORIDE 0.9 % (FLUSH) 0.9 %
10 SYRINGE (ML) INJECTION AS NEEDED
Status: CANCELLED | OUTPATIENT
Start: 2021-11-02 | End: 2021-11-03

## 2021-10-25 RX ORDER — SODIUM CHLORIDE 9 MG/ML
10 INJECTION INTRAMUSCULAR; INTRAVENOUS; SUBCUTANEOUS AS NEEDED
Status: CANCELLED | OUTPATIENT
Start: 2021-11-09

## 2021-10-25 RX ORDER — EPINEPHRINE 1 MG/ML
0.3 INJECTION, SOLUTION, CONCENTRATE INTRAVENOUS AS NEEDED
Status: CANCELLED | OUTPATIENT
Start: 2021-11-02

## 2021-10-25 RX ORDER — ACETAMINOPHEN 325 MG/1
650 TABLET ORAL AS NEEDED
Status: CANCELLED
Start: 2021-11-02

## 2021-10-25 RX ORDER — EPINEPHRINE 1 MG/ML
0.3 INJECTION, SOLUTION, CONCENTRATE INTRAVENOUS AS NEEDED
Status: CANCELLED | OUTPATIENT
Start: 2021-11-09

## 2021-10-25 RX ORDER — ONDANSETRON 2 MG/ML
8 INJECTION INTRAMUSCULAR; INTRAVENOUS AS NEEDED
Status: CANCELLED | OUTPATIENT
Start: 2021-11-09

## 2021-10-25 RX ORDER — SODIUM CHLORIDE 9 MG/ML
25 INJECTION, SOLUTION INTRAVENOUS CONTINUOUS
Status: CANCELLED | OUTPATIENT
Start: 2021-11-09 | End: 2021-11-10

## 2021-10-25 RX ORDER — HYDROCORTISONE SODIUM SUCCINATE 100 MG/2ML
100 INJECTION, POWDER, FOR SOLUTION INTRAMUSCULAR; INTRAVENOUS AS NEEDED
Status: CANCELLED | OUTPATIENT
Start: 2021-11-09

## 2021-10-25 RX ORDER — ALBUTEROL SULFATE 0.83 MG/ML
2.5 SOLUTION RESPIRATORY (INHALATION) AS NEEDED
Status: CANCELLED
Start: 2021-11-09

## 2021-10-25 RX ORDER — HEPARIN 100 UNIT/ML
300-500 SYRINGE INTRAVENOUS AS NEEDED
Status: CANCELLED
Start: 2021-11-02

## 2021-10-25 RX ORDER — DIPHENHYDRAMINE HYDROCHLORIDE 50 MG/ML
25 INJECTION, SOLUTION INTRAMUSCULAR; INTRAVENOUS AS NEEDED
Status: CANCELLED
Start: 2021-11-02

## 2021-10-25 RX ORDER — DIPHENHYDRAMINE HYDROCHLORIDE 50 MG/ML
50 INJECTION, SOLUTION INTRAMUSCULAR; INTRAVENOUS AS NEEDED
Status: CANCELLED
Start: 2021-11-02

## 2021-10-25 RX ORDER — SODIUM CHLORIDE 9 MG/ML
10 INJECTION INTRAMUSCULAR; INTRAVENOUS; SUBCUTANEOUS AS NEEDED
Status: CANCELLED | OUTPATIENT
Start: 2021-11-02

## 2021-10-25 RX ORDER — DIPHENHYDRAMINE HYDROCHLORIDE 50 MG/ML
50 INJECTION, SOLUTION INTRAMUSCULAR; INTRAVENOUS AS NEEDED
Status: CANCELLED
Start: 2021-11-09

## 2021-10-25 RX ORDER — ALBUTEROL SULFATE 0.83 MG/ML
2.5 SOLUTION RESPIRATORY (INHALATION) AS NEEDED
Status: CANCELLED
Start: 2021-11-02

## 2021-10-25 RX ORDER — HYDROCORTISONE SODIUM SUCCINATE 100 MG/2ML
100 INJECTION, POWDER, FOR SOLUTION INTRAMUSCULAR; INTRAVENOUS AS NEEDED
Status: CANCELLED | OUTPATIENT
Start: 2021-11-02

## 2021-10-25 NOTE — PROGRESS NOTES
Mdaai Zaragoza is a 74 y.o. female new pt referred by BLANCHE Landon to provider for anemia. Pt noted ambulating w/ a cane. Pt report she has pain in her right arm; some relief w/ pain med's and muscle spasm med's. Pt report she feels very tired.      Chief Complaint   Patient presents with    Follow-up    Anemia

## 2021-10-25 NOTE — PROGRESS NOTES
[facility] [place] at [address] [facility] [address] [facility]  W: [phone] F: [phone]      Reason for Visit:   Leo Marques is a 74 y.o. female who is seen in consultation at the request of Dr. Court Smiht for evaluation of severe normocytic anemia. Hematology / Oncology Treatment History:     Hematological/Oncological Diagnosis: Severe normocytic anemia    Date of Diagnosis: Greater than 3 years    Treatment course: Oral iron      History of Present Illness:     Very pleasant 76-year-old female with relevant medical history was significant for coronary artery disease, diabetes mellitus type 2, hypertension, hypothyroidism, GERD, dyspepsia, presents for evaluation and treatment of chronic normocytic anemia that is been present for at least 3 years. Most recently, the patient did have labs done through her PCP that showed the following:     CBC that shows hemoglobin of 7.7 g/dL, hematocrit of 25%, WBC count of 9.2, RDW of 14.4, MCV of 90, platelets of 356. Completed on October 4, 2021. Creatinine is normal at 0.93, as is calcium at 9.4. LFTs are normal with AST of 11, ALT of 8, total bilirubin of less than 0.2. Iron saturation was 7% with a ferritin of 8    The patient has tried oral supplementation with iron but has significant's symptoms of nausea, abdominal distress that limits her tolerability of oral iron. Otherwise, no other constitutional symptoms of be concerning for hematological malignancy. She denies any night sweats, unintentional weight loss. She does have minor ice cravings but otherwise is without additional symptoms. Family history reviewed, notable only for lung cancer in her sister diagnosed small cell. Social history reviewed, noncontributory    Positive ROS findings include: Fatigue  Review of Systems: A complete review of systems was obtained, negative except as described above.     Past Medical History:   Diagnosis Date    Arthritis     CAD (coronary artery disease)     6 stents (CARMITA) 3/21/18 at [address]    Diabetes ([facility] [address]     Dizziness     years ago     Dyspepsia and other specified disorders of function of stomach     GERD (gastroesophageal reflux disease)     Hx of cardiac cath 2018    after abnormal stress test 3/13/18    Hypertension complicating diabetes ([address]     Loss of appetite     Musculoskeletal disorder     back pain    Other ill-defined conditions(799.89)     shingles    Thyroid disease     hypothyroidism      Past Surgical History:   Procedure Laterality Date    HX GYN  1996, [address]    hysterectomy & 2 c-sections    HX HEENT  1963    tonsils    HX ORTHOPAEDIC Bilateral     carpal tunnel release    HX ORTHOPAEDIC      neck surgery fusion and lower back fusion    HX OTHER SURGICAL      sweat glands removed @ [facility]    HX OTHER SURGICAL      abscess removed from right back    [place] CARDIAC SURG PROCEDURE UNLIST  2018    6 stents     [place] DRAIN SKIN ABSCESS SIMPLE  12      Social History     Tobacco Use    Smoking status: Former Smoker     Packs/day: 0.50     Years: 25.00     Pack years: 12.50     Quit date: 1999     Years since quittin.8    Smokeless tobacco: Never Used   Substance Use Topics    Alcohol use: Yes     Comment: very rare      Family History   Problem Relation Age of Onset    Diabetes Mother    [facility] Hypertension Mother     Hypertension Father     Diabetes Father     Stroke Father     Heart Attack Father     Diabetes Sister     Cancer Sister         lung     Current Outpatient Medications   Medication Sig    acetaminophen (Tylenol 8 Hour) 650 mg TbER Take 650 mg by mouth every eight (8) hours as needed for Pain. Indications: pain associated with arthritis    insulin NPH/insulin regular (NovoLIN 70/30 U-100 Insulin) 100 unit/mL (70-30) injection by SubCUTAneous route. 12units in AM and 10 units at dinner.     tiZANidine (ZANAFLEX) 4 mg tablet Take 1 tablet by mouth three times daily as needed for muscle spasm    famotidine (PEPCID) 40 mg tablet Take 1 Tab by mouth daily.  rosuvastatin (CRESTOR) 20 mg tablet Take 1 Tab by mouth nightly.  metFORMIN (GLUCOPHAGE) 500 mg tablet Take 1 Tab by mouth two (2) times daily (with meals).  levothyroxine (SYNTHROID) 112 mcg tablet Take in am 5 days a week (Patient taking differently: Take in am 7 days a week)    flash glucose sensor (FREESTYLE TRES 14 DAY SENSOR) kit Use to check blood sugar. DXE11.9    carvedilol (COREG) 3.125 mg tablet Take 12.5 mg by mouth two (2) times daily (with meals).  Blood-Glucose Meter (RELION PRIME METER) misc by Does Not Apply route.  aspirin delayed-release 81 mg tablet Take 81 mg by mouth daily. No current facility-administered medications for this visit. Allergies   Allergen Reactions    Penicillins Rash            Physical Exam:     Visit Vitals  /79   Pulse 87   Temp 98.7 °F (37.1 °C) (Oral)   Resp 18   Ht 5' 4\" (1.626 m)   Wt 189 lb (85.7 kg)   SpO2 99%   BMI 32.44 kg/m²     ECOG PS: 1  General: No distress  Eyes: PERRLA, anicteric sclerae  HENT: Atraumatic with normal appearance of ears and nose; OP clear  Neck: Supple; no visualized JVD  Lymphatic: No cervical, or supraclavicular lymphadenopathy. Respiratory: CTAB, normal respiratory effort  CV: Normal rate, regular rhythm, no murmurs, no peripheral edema  GI: Soft, nontender, nondistended, no palpable masses, no hepatomegaly, no splenomegaly  MS: Cautious gait. Digits without clubbing or cyanosis. Skin: No rashes, ecchymoses, or petechiae. Normal temperature, turgor, and texture.   Neuro/Psych: Alert, oriented, appropriate affect, normal judgment/insight      Results:     Lab Results   Component Value Date/Time    WBC 11.3 (H) 09/08/2020 04:02 PM    HGB 9.0 (L) 09/08/2020 04:02 PM    HCT 27.5 (L) 09/08/2020 04:02 PM    PLATELET 786 (H) 07/93/6022 04:02 PM    MCV 93 09/08/2020 04:02 PM ABS. NEUTROPHILS 5.4 09/08/2020 04:02 PM    HGB (POC) 10.3 (A) 01/20/2020 10:59 AM    Hematocrit (POC) 23 (L) 05/07/2019 12:21 PM    HCT (POC) 32.2 (A) 01/20/2020 10:59 AM     Lab Results   Component Value Date/Time    Sodium 141 09/08/2020 04:02 PM    Potassium 4.6 09/08/2020 04:02 PM    Chloride 105 09/08/2020 04:02 PM    CO2 25 09/08/2020 04:02 PM    Glucose 67 09/08/2020 04:02 PM    BUN 20 09/08/2020 04:02 PM    Creatinine 0.94 09/08/2020 04:02 PM    GFR est AA 73 09/08/2020 04:02 PM    GFR est non-AA 63 09/08/2020 04:02 PM    Calcium 9.6 09/08/2020 04:02 PM    Sodium (POC) 146 (H) 05/07/2019 12:21 PM    Potassium (POC) 3.3 (L) 05/07/2019 12:21 PM    Chloride (POC) 103 05/07/2019 12:21 PM    Glucose (POC) 192 (H) 09/29/2021 12:23 PM    BUN (POC) 7 (L) 05/07/2019 12:21 PM    Creatinine (POC) 0.7 05/07/2019 12:21 PM    Calcium, ionized (POC) 1.16 05/07/2019 12:21 PM     Lab Results   Component Value Date/Time    Bilirubin, total <0.2 09/08/2020 04:02 PM    ALT (SGPT) 9 09/08/2020 04:02 PM    Alk. phosphatase 83 09/08/2020 04:02 PM    Protein, total 6.9 09/08/2020 04:02 PM    Albumin 4.3 09/08/2020 04:02 PM    Globulin 3.7 05/09/2019 06:54 AM       Assessment and Recommendations:      # Normocytic Anemia     - The differential diagnosis for a normocytic anemia is broad, and includes blood loss, anemia of chronic disease, chronic renal insufficiency, iron deficiency, hypothyroidism, hemolysis, and bone marrow suppression. MDS, B12 deficiency, folate deficiency, and alcohol abuse can also lead to anemia, though it is generally macrocytic.   In this particular patient's case, I suspect iron deficiency playing a large role in her anemia.    -Some of the hematology work-up is already been completed including W27, folic acid, iron saturation, ferritin, TSH, creatinine, calcium level all of which were normal except for iron saturation of 7%, ferritin of 8.    -Given patient's intolerance to oral iron, will plan for Injectafer 750 mg weekly x2 weeks to start as soon as possible.    -Plan for follow-up in about 2 months after last IV iron. If patient does not have appropriate response to IV iron therapy, will proceed with more extensive hematological work-up.     -Plan for follow-up in about 2 months with repeat CBC, CMP, iron studies  Signed By: India Bran MD      Attending Medical Oncologist   [facility]

## 2021-10-28 ENCOUNTER — TELEPHONE (OUTPATIENT)
Dept: ONCOLOGY | Age: 66
End: 2021-10-28

## 2021-10-29 NOTE — TELEPHONE ENCOUNTER
Return call placed to pt. HIPAA verified by two patient identifiers. Pt requesting to know he Hgb level. Pt informed by nurse her labs show her hgb was 8.1 when she had her labs drawn on 10/27. Pt excited about her Hgb going up. Nurse confirmed pt's appt for IV iron on 11/2 at 3pm. Pt nikolas;itz understanding and thanked writer for call.

## 2021-11-02 ENCOUNTER — HOSPITAL ENCOUNTER (OUTPATIENT)
Dept: INFUSION THERAPY | Age: 66
Discharge: HOME | End: 2021-11-02
Payer: MEDICARE

## 2021-11-02 VITALS
RESPIRATION RATE: 18 BRPM | TEMPERATURE: 97.8 F | SYSTOLIC BLOOD PRESSURE: 128 MMHG | DIASTOLIC BLOOD PRESSURE: 71 MMHG | HEART RATE: 84 BPM

## 2021-11-02 DIAGNOSIS — D50.9 IRON DEFICIENCY ANEMIA, UNSPECIFIED IRON DEFICIENCY ANEMIA TYPE: Primary | ICD-10-CM

## 2021-11-02 PROCEDURE — 96365 THER/PROPH/DIAG IV INF INIT: CPT

## 2021-11-02 PROCEDURE — 74011250636 HC RX REV CODE- 250/636: Performed by: STUDENT IN AN ORGANIZED HEALTH CARE EDUCATION/TRAINING PROGRAM

## 2021-11-02 RX ORDER — SODIUM CHLORIDE 0.9 % (FLUSH) 0.9 %
10 SYRINGE (ML) INJECTION AS NEEDED
Status: DISCONTINUED | OUTPATIENT
Start: 2021-11-02 | End: 2021-11-03 | Stop reason: HOSPADM

## 2021-11-02 RX ADMIN — FERRIC CARBOXYMALTOSE INJECTION 750 MG: 50 INJECTION, SOLUTION INTRAVENOUS at 16:13

## 2021-11-02 RX ADMIN — Medication 10 ML: at 16:15

## 2021-11-09 ENCOUNTER — HOSPITAL ENCOUNTER (OUTPATIENT)
Dept: INFUSION THERAPY | Age: 66
Discharge: HOME | End: 2021-11-09
Payer: MEDICARE

## 2021-11-09 VITALS
SYSTOLIC BLOOD PRESSURE: 138 MMHG | HEART RATE: 88 BPM | OXYGEN SATURATION: 100 % | BODY MASS INDEX: 32.46 KG/M2 | RESPIRATION RATE: 18 BRPM | TEMPERATURE: 97.9 F | DIASTOLIC BLOOD PRESSURE: 80 MMHG | WEIGHT: 189.1 LBS

## 2021-11-09 DIAGNOSIS — D50.9 IRON DEFICIENCY ANEMIA, UNSPECIFIED IRON DEFICIENCY ANEMIA TYPE: Primary | ICD-10-CM

## 2021-11-09 PROCEDURE — 74011250636 HC RX REV CODE- 250/636: Performed by: STUDENT IN AN ORGANIZED HEALTH CARE EDUCATION/TRAINING PROGRAM

## 2021-11-09 PROCEDURE — 96365 THER/PROPH/DIAG IV INF INIT: CPT

## 2021-11-09 RX ORDER — SODIUM CHLORIDE 0.9 % (FLUSH) 0.9 %
10 SYRINGE (ML) INJECTION AS NEEDED
Status: DISPENSED | OUTPATIENT
Start: 2021-11-09 | End: 2021-11-09

## 2021-11-09 RX ADMIN — Medication 10 ML: at 10:18

## 2021-11-09 RX ADMIN — FERRIC CARBOXYMALTOSE INJECTION 750 MG: 50 INJECTION, SOLUTION INTRAVENOUS at 10:32

## 2021-11-09 RX ADMIN — Medication 20 ML: at 10:55

## 2021-11-09 NOTE — PROGRESS NOTES
Pt arrived to [facility] ambulatory in no acute distress at 1015 for Injectafer dose 2 of 2. Assessment unremarkable except reports fatigue and some diarrhea. #24g PIV established in [facility] without issue and positive blood return noted. Patient denied having any symptoms of COVID-19, i.e. SOB, coughing, fever, or generally not feeling well. Also denies having been exposed to COVID-19 recently or having had any recent contact with family/friend that has a pending COVID test.     Visit Vitals  /78 (BP 1 Location: Left upper arm, BP Patient Position: Sitting)   Pulse 91   Temp 97.9 °F (36.6 °C)   Resp 18   Wt 85.8 kg (189 lb 1.6 oz)   SpO2 100%   BMI 32.46 kg/m²       The following medications administered:  Injectafer 750 mg IV over 20 minutes    Visit Vitals  /80 (BP 1 Location: Right upper arm, BP Patient Position: Sitting)   Pulse 88   Temp 97.9 °F (36.6 °C)   Resp 18   Wt 85.8 kg (189 lb 1.6 oz)   SpO2 100%   BMI 32.46 kg/m²       Pt tolerated treatment well. No adverse reaction noted after monitoring patient for 30 minutes. IV flushed per policy and removed, 2x2 and coban placed. Pt discharged ambulatory in no acute distress at 1125, accompanied by self. No further appointments scheduled at this time.

## 2021-11-12 ENCOUNTER — TRANSCRIBE ORDER (OUTPATIENT)
Dept: SCHEDULING | Age: 66
End: 2021-11-12

## 2021-11-12 DIAGNOSIS — R59.0 SUPRACLAVICULAR ADENOPATHY: Primary | ICD-10-CM

## 2021-11-29 ENCOUNTER — HOSPITAL ENCOUNTER (OUTPATIENT)
Dept: ULTRASOUND IMAGING | Age: 66
Discharge: HOME | End: 2021-11-29
Attending: NURSE PRACTITIONER
Payer: MEDICARE

## 2021-11-29 DIAGNOSIS — R59.0 SUPRACLAVICULAR ADENOPATHY: ICD-10-CM

## 2021-11-29 PROCEDURE — 76536 US EXAM OF HEAD AND NECK: CPT

## 2021-12-09 ENCOUNTER — TELEPHONE (OUTPATIENT)
Dept: ONCOLOGY | Age: 66
End: 2021-12-09

## 2021-12-09 NOTE — TELEPHONE ENCOUNTER
Called patient regarding change time of appointment. Patient has confirmed 12/27/21 @ 130pm ([facility]).

## 2021-12-23 NOTE — PROGRESS NOTES
Rahat Monzon is a 74 y.o. female here for 2 month follow up for severe normocytic anemia. Pt states she has been doing a lot better. She does not get fatigued like she use too. 1. Have you been to the ER, urgent care clinic since your last visit? Hospitalized since your last visit?   no    2. Have you seen or consulted any other health care providers outside of the [address] [place] [address] since your last visit? Include any pap smears or colon screening.   no

## 2021-12-27 ENCOUNTER — OFFICE VISIT (OUTPATIENT)
Dept: ONCOLOGY | Age: 66
End: 2021-12-27
Payer: MEDICARE

## 2021-12-27 VITALS
HEIGHT: 64 IN | BODY MASS INDEX: 32.95 KG/M2 | SYSTOLIC BLOOD PRESSURE: 143 MMHG | TEMPERATURE: 98.2 F | WEIGHT: 193 LBS | DIASTOLIC BLOOD PRESSURE: 75 MMHG | HEART RATE: 96 BPM | OXYGEN SATURATION: 98 %

## 2021-12-27 DIAGNOSIS — D64.9 NORMOCYTIC ANEMIA: Primary | ICD-10-CM

## 2021-12-27 PROCEDURE — G8754 DIAS BP LESS 90: HCPCS | Performed by: STUDENT IN AN ORGANIZED HEALTH CARE EDUCATION/TRAINING PROGRAM

## 2021-12-27 PROCEDURE — G8536 NO DOC ELDER MAL SCRN: HCPCS | Performed by: STUDENT IN AN ORGANIZED HEALTH CARE EDUCATION/TRAINING PROGRAM

## 2021-12-27 PROCEDURE — G8427 DOCREV CUR MEDS BY ELIG CLIN: HCPCS | Performed by: STUDENT IN AN ORGANIZED HEALTH CARE EDUCATION/TRAINING PROGRAM

## 2021-12-27 PROCEDURE — G9899 SCRN MAM PERF RSLTS DOC: HCPCS | Performed by: STUDENT IN AN ORGANIZED HEALTH CARE EDUCATION/TRAINING PROGRAM

## 2021-12-27 PROCEDURE — G8400 PT W/DXA NO RESULTS DOC: HCPCS | Performed by: STUDENT IN AN ORGANIZED HEALTH CARE EDUCATION/TRAINING PROGRAM

## 2021-12-27 PROCEDURE — 99214 OFFICE O/P EST MOD 30 MIN: CPT | Performed by: STUDENT IN AN ORGANIZED HEALTH CARE EDUCATION/TRAINING PROGRAM

## 2021-12-27 PROCEDURE — G8417 CALC BMI ABV UP PARAM F/U: HCPCS | Performed by: STUDENT IN AN ORGANIZED HEALTH CARE EDUCATION/TRAINING PROGRAM

## 2021-12-27 PROCEDURE — 3017F COLORECTAL CA SCREEN DOC REV: CPT | Performed by: STUDENT IN AN ORGANIZED HEALTH CARE EDUCATION/TRAINING PROGRAM

## 2021-12-27 PROCEDURE — G8753 SYS BP > OR = 140: HCPCS | Performed by: STUDENT IN AN ORGANIZED HEALTH CARE EDUCATION/TRAINING PROGRAM

## 2021-12-27 PROCEDURE — 1101F PT FALLS ASSESS-DOCD LE1/YR: CPT | Performed by: STUDENT IN AN ORGANIZED HEALTH CARE EDUCATION/TRAINING PROGRAM

## 2021-12-27 PROCEDURE — G8432 DEP SCR NOT DOC, RNG: HCPCS | Performed by: STUDENT IN AN ORGANIZED HEALTH CARE EDUCATION/TRAINING PROGRAM

## 2021-12-27 PROCEDURE — 1090F PRES/ABSN URINE INCON ASSESS: CPT | Performed by: STUDENT IN AN ORGANIZED HEALTH CARE EDUCATION/TRAINING PROGRAM

## 2021-12-27 NOTE — PROGRESS NOTES
[facility] [place] at [address]  W: [phone] F: [phone]      Reason for Visit:   Birdia Saint is a 74 y.o. female who is seen in consultation at the request of Dr. Kenneth Mosley for evaluation of severe normocytic anemia. Hematology / Oncology Treatment History:     Hematological/Oncological Diagnosis: Severe normocytic anemia    Date of Diagnosis: Greater than 3 years    Treatment course: Oral iron      History of Present Illness:     Very pleasant 53-year-old female with relevant medical history was significant for coronary artery disease, diabetes mellitus type 2, hypertension, hypothyroidism, GERD, dyspepsia, presents for evaluation and treatment of chronic normocytic anemia that is been present for at least 3 years. Most recently, the patient did have labs done through her PCP that showed the following:     CBC that shows hemoglobin of 7.7 g/dL, hematocrit of 25%, WBC count of 9.2, RDW of 14.4, MCV of 90, platelets of 521. Completed on October 4, 2021. Creatinine is normal at 0.93, as is calcium at 9.4. LFTs are normal with AST of 11, ALT of 8, total bilirubin of less than 0.2. Iron saturation was 7% with a ferritin of 8    The patient has tried oral supplementation with iron but has significant's symptoms of nausea, abdominal distress that limits her tolerability of oral iron. Otherwise, no other constitutional symptoms of be concerning for hematological malignancy. She denies any night sweats, unintentional weight loss. She does have minor ice cravings but otherwise is without additional symptoms. Family history reviewed, notable only for lung cancer in her sister diagnosed small cell. Social history reviewed, noncontributory    Positive ROS findings include: Fatigue  Review of Systems: A complete review of systems was obtained, negative except as described above.     Interval History:     21:  Patient got Evan Numbers on 21 and 21 with good toleration. Reports marked improvement in prior fatigue. No new bleeding or melena. Patient in good spirits, feels well. Past Medical History:   Diagnosis Date    Arthritis     CAD (coronary artery disease)     [facility] st[facility]) [facility]    Diabetes ([facility] [address]     Dizziness     years ago     Dyspepsia and other specified disorders of function of stomach     GERD (gastroesophageal reflux disease)     Hx of cardiac cath 2018    after abnormal stress test 3/13/18    Hypertension complicating diabetes ([address]     Loss of appetite     Musculoskeletal disorder     back pain    Other ill-defined conditions(799.89)     shingles    Thyroid disease     hypothyroidism      Past Surgical History:   Procedure Laterality Date    HX GYN  , [address]    hysterectomy & 2 c-sections    HX HEENT  1963    tonsils    HX ORTHOPAEDIC Bilateral     carpal tunnel release    HX ORTHOPAEDIC      neck surgery fusion and lower back fusion    HX OTHER SURGICAL      sweat glands removed @ [facility]    HX OTHER SURGICAL      abscess removed from right back    [place] CARDIAC SURG PROCEDURE UNLIST  2018    6 stents     [place] DRAIN SKIN ABSCESS SIMPLE  12      Social History     Tobacco Use    Smoking status: Former Smoker     Packs/day: 0.50     Years: 25.00     Pack years: 12.50     Quit date: 1999     Years since quittin.0    Smokeless tobacco: Never Used   Substance Use Topics    Alcohol use: Yes     Comment: very rare      Family History   Problem Relation Age of Onset    Diabetes Mother    [employer] Hypertension Mother     Hypertension Father     Diabetes Father     Stroke Father     Heart Attack Father     Diabetes Sister     Cancer Sister         lung     Current Outpatient Medications   Medication Sig    acetaminophen (Tylenol 8 Hour) 650 mg TbER Take 650 mg by mouth every eight (8) hours as needed for Pain. Indications: pain associated with arthritis    insulin NPH/insulin regular (NovoLIN 70/30 U-100 Insulin) 100 unit/mL (70-30) injection by SubCUTAneous route. 12units in AM and 10 units at dinner.  tiZANidine (ZANAFLEX) 4 mg tablet Take 1 tablet by mouth three times daily as needed for muscle spasm    famotidine (PEPCID) 40 mg tablet Take 1 Tab by mouth daily.  rosuvastatin (CRESTOR) 20 mg tablet Take 1 Tab by mouth nightly.  metFORMIN (GLUCOPHAGE) 500 mg tablet Take 1 Tab by mouth two (2) times daily (with meals).  levothyroxine (SYNTHROID) 112 mcg tablet Take in am 5 days a week (Patient taking differently: Take in am 7 days a week)    flash glucose sensor ([place] TRES 14 DAY SENSOR) kit Use to check blood sugar. DXE11.9    carvedilol (COREG) 3.125 mg tablet Take 12.5 mg by mouth two (2) times daily (with meals).  Blood-Glucose Meter (RELION PRIME METER) misc by Does Not Apply route.  aspirin delayed-release 81 mg tablet Take 81 mg by mouth daily. No current facility-administered medications for this visit. Allergies   Allergen Reactions    Penicillins Rash            Physical Exam:     Visit Vitals  BP (!) 143/75 (BP 1 Location: Left upper arm, BP Patient Position: Sitting)   Pulse 96   Temp 98.2 °F (36.8 °C) (Temporal)   Ht 5' 4\" (1.626 m)   Wt 193 lb (87.5 kg)   SpO2 98%   BMI 33.13 kg/m²     ECOG PS: 1  General: No distress  Eyes: PERRLA, anicteric sclerae  HENT: Atraumatic with normal appearance of ears and nose; OP clear  Neck: Supple; no visualized JVD  Lymphatic: No cervical, or supraclavicular lymphadenopathy. Respiratory: CTAB, normal respiratory effort  CV: Normal rate, regular rhythm, no murmurs, no peripheral edema  GI: Soft, nontender, nondistended, no palpable masses, no hepatomegaly, no splenomegaly  MS: Cautious gait, ambulates with cane. Digits without clubbing or cyanosis. Skin: No rashes, ecchymoses, or petechiae.  Normal temperature, turgor, and texture. Neuro/Psych: Alert, oriented, appropriate affect, normal judgment/insight      Results:     Lab Results   Component Value Date/Time    WBC 7.8 10/27/2021 07:22 AM    HGB 8.1 (L) 10/27/2021 07:22 AM    HCT 25.9 (L) 10/27/2021 07:22 AM    PLATELET 798 98/06/9158 07:22 AM    MCV 90.6 10/27/2021 07:22 AM    ABS. NEUTROPHILS 4.0 10/27/2021 07:22 AM    HGB (POC) 10.3 (A) 01/20/2020 10:59 AM    Hematocrit (POC) 23 (L) 05/07/2019 12:21 PM    HCT (POC) 32.2 (A) 01/20/2020 10:59 AM     Lab Results   Component Value Date/Time    Sodium 141 10/27/2021 07:22 AM    Potassium 4.5 10/27/2021 07:22 AM    Chloride 114 (H) 10/27/2021 07:22 AM    CO2 26 10/27/2021 07:22 AM    Glucose 140 (H) 10/27/2021 07:22 AM    BUN 9 10/27/2021 07:22 AM    Creatinine 0.95 10/27/2021 07:22 AM    GFR est AA >60 10/27/2021 07:22 AM    GFR est non-AA 59 (L) 10/27/2021 07:22 AM    Calcium 9.5 10/27/2021 07:22 AM    Sodium (POC) 146 (H) 05/07/2019 12:21 PM    Potassium (POC) 3.3 (L) 05/07/2019 12:21 PM    Chloride (POC) 103 05/07/2019 12:21 PM    Glucose (POC) 192 (H) 09/29/2021 12:23 PM    BUN (POC) 7 (L) 05/07/2019 12:21 PM    Creatinine (POC) 0.7 05/07/2019 12:21 PM    Calcium, ionized (POC) 1.16 05/07/2019 12:21 PM     Lab Results   Component Value Date/Time    Bilirubin, total 0.3 10/27/2021 07:22 AM    ALT (SGPT) 11 (L) 10/27/2021 07:22 AM    Alk. phosphatase 109 10/27/2021 07:22 AM    Protein, total 7.2 10/27/2021 07:22 AM    Albumin 3.5 10/27/2021 07:22 AM    Globulin 3.7 10/27/2021 07:22 AM       Assessment and Recommendations:      # Normocytic Anemia, due to iron deficiency anemia    - Repeat CBC and iron profile today  - workup showed iron deficiency  - patient follows with Dr. Abigail James tomorrow. Patient advised to inquire about colonoscopy.    - received IV iron with Injectafer on 11/2/21 and 11/9/21    -Plan for follow-up in about 6 months with repeat CBC, CMP, iron studies      Signed By: Melba Coombs MD      Attending Medical [address] [facility] [address]

## 2022-01-24 ENCOUNTER — TELEPHONE (OUTPATIENT)
Dept: ONCOLOGY | Age: 67
End: 2022-01-24

## 2022-01-24 NOTE — TELEPHONE ENCOUNTER
Called patient, per Voicemail. Patient states she saw her PCP on 1/14/22 at which time blood work was done. Patient states, \"Iron levels were at or around 10\". Patient states she is feeling very tired. Patient was asked to have PCP fax over lab work from 1/14/22 appointment. Please follow up with the patient.

## 2022-01-25 NOTE — TELEPHONE ENCOUNTER
Return call placed to pt. HIPAA verified by two patient identifiers. Pt informed we received her labs from [facility] and that her lab values have improved and that she will not be needing IV iron d/t her lab values improving. Pt advised to f/u w/ her PCP and confirmed her next appt w/ Dr. Maryam Ashby. Pt verbalized understanding. Writer thanked for call.

## 2022-01-31 ENCOUNTER — TELEPHONE (OUTPATIENT)
Dept: ONCOLOGY | Age: 67
End: 2022-01-31

## 2022-01-31 NOTE — TELEPHONE ENCOUNTER
Patient called and was told that after her blood test results came in, she would not need to have another infusion and would not need to be seen until her regularly scheduled appt. Patient states she is not sure why she would not be given a treatment or be seen in.  \"Why the doctor would not want her to feel better\"

## 2022-03-18 PROBLEM — M48.02 CERVICAL STENOSIS OF SPINE: Status: ACTIVE | Noted: 2018-10-31

## 2022-03-18 PROBLEM — D50.9 IRON DEFICIENCY ANEMIA: Status: ACTIVE | Noted: 2021-10-25

## 2022-03-19 PROBLEM — M48.02 CERVICAL STENOSIS OF SPINAL CANAL: Status: ACTIVE | Noted: 2018-10-30

## 2022-03-19 PROBLEM — M48.061 SPINAL STENOSIS OF LUMBAR REGION AT MULTIPLE LEVELS: Status: ACTIVE | Noted: 2019-05-06

## 2022-03-19 PROBLEM — Z98.1 S/P LUMBAR SPINAL FUSION: Status: ACTIVE | Noted: 2019-05-06

## 2022-03-19 PROBLEM — E03.9 HYPOTHYROIDISM: Status: ACTIVE | Noted: 2018-08-20

## 2022-03-28 ENCOUNTER — ANESTHESIA EVENT (OUTPATIENT)
Dept: ENDOSCOPY | Age: 67
End: 2022-03-28
Payer: MEDICARE

## 2022-03-28 NOTE — ANESTHESIA PREPROCEDURE EVALUATION
Anesthetic History   No history of anesthetic complications            Review of Systems / Medical History  Patient summary reviewed, nursing notes reviewed and pertinent labs reviewed    Pulmonary          Smoker      Comments: Former smoker 12.5 pack years   Neuro/Psych         Headaches    Comments: Lumbar spinal stenosis s/p L2-L5 lateral fusion (5/6/19) and L2-L5 posterior fusion (5/7/19)  S/P C3-C5 ACDF (10/30/18) Cardiovascular  Within defined limits  Hypertension          CAD and cardiac stents (Stents x 6)    Exercise tolerance: >4 METS  Comments: ECG (10/15/20):  Normal Sinus Rhythm  Moderate voltage criteria for LVH, may be normal variant  Borderline ECG   GI/Hepatic/Renal  Within defined limits   GERD           Endo/Other    Diabetes: poorly controlled, type 2, using insulin  Hypothyroidism  Obesity, arthritis and anemia (Iron Deficiency Anemia)     Other Findings            Physical Exam    Airway  Mallampati: II  TM Distance: 4 - 6 cm  Neck ROM: normal range of motion   Mouth opening: Normal     Cardiovascular  Regular rate and rhythm,  S1 and S2 normal,  no murmur, click, rub, or gallop             Dental    Dentition: Poor dentition and Full upper dentures  Comments: Multiple missing lower teeth, none loose.    Pulmonary  Breath sounds clear to auscultation               Abdominal  GI exam deferred       Other Findings            Anesthetic Plan    ASA: 3  Anesthesia type: MAC          Induction: Intravenous  Anesthetic plan and risks discussed with: Patient

## 2022-03-30 ENCOUNTER — HOSPITAL ENCOUNTER (OUTPATIENT)
Age: 67
Setting detail: OUTPATIENT SURGERY
Discharge: HOME | End: 2022-03-30
Attending: INTERNAL MEDICINE | Admitting: INTERNAL MEDICINE
Payer: MEDICARE

## 2022-03-30 ENCOUNTER — ANESTHESIA (OUTPATIENT)
Dept: ENDOSCOPY | Age: 67
End: 2022-03-30
Payer: MEDICARE

## 2022-03-30 VITALS
WEIGHT: 198 LBS | BODY MASS INDEX: 33.8 KG/M2 | DIASTOLIC BLOOD PRESSURE: 79 MMHG | TEMPERATURE: 97.3 F | HEIGHT: 64 IN | OXYGEN SATURATION: 97 % | RESPIRATION RATE: 16 BRPM | HEART RATE: 84 BPM | SYSTOLIC BLOOD PRESSURE: 144 MMHG

## 2022-03-30 LAB
GLUCOSE BLD STRIP.AUTO-MCNC: 250 MG/DL (ref 65–117)
SERVICE CMNT-IMP: ABNORMAL

## 2022-03-30 PROCEDURE — 76060000031 HC ANESTHESIA FIRST 0.5 HR: Performed by: INTERNAL MEDICINE

## 2022-03-30 PROCEDURE — 74011250636 HC RX REV CODE- 250/636: Performed by: INTERNAL MEDICINE

## 2022-03-30 PROCEDURE — 82962 GLUCOSE BLOOD TEST: CPT

## 2022-03-30 PROCEDURE — 76040000019: Performed by: INTERNAL MEDICINE

## 2022-03-30 PROCEDURE — 74011000250 HC RX REV CODE- 250: Performed by: NURSE ANESTHETIST, CERTIFIED REGISTERED

## 2022-03-30 PROCEDURE — 74011250636 HC RX REV CODE- 250/636: Performed by: NURSE ANESTHETIST, CERTIFIED REGISTERED

## 2022-03-30 PROCEDURE — 2709999900 HC NON-CHARGEABLE SUPPLY: Performed by: INTERNAL MEDICINE

## 2022-03-30 RX ORDER — SODIUM CHLORIDE 0.9 % (FLUSH) 0.9 %
5-40 SYRINGE (ML) INJECTION AS NEEDED
Status: DISCONTINUED | OUTPATIENT
Start: 2022-03-30 | End: 2022-03-30 | Stop reason: HOSPADM

## 2022-03-30 RX ORDER — EPINEPHRINE 0.1 MG/ML
1 INJECTION INTRACARDIAC; INTRAVENOUS
Status: DISCONTINUED | OUTPATIENT
Start: 2022-03-30 | End: 2022-03-30 | Stop reason: HOSPADM

## 2022-03-30 RX ORDER — NALOXONE HYDROCHLORIDE 0.4 MG/ML
0.4 INJECTION, SOLUTION INTRAMUSCULAR; INTRAVENOUS; SUBCUTANEOUS
Status: DISCONTINUED | OUTPATIENT
Start: 2022-03-30 | End: 2022-03-30 | Stop reason: HOSPADM

## 2022-03-30 RX ORDER — PROPOFOL 10 MG/ML
INJECTION, EMULSION INTRAVENOUS AS NEEDED
Status: DISCONTINUED | OUTPATIENT
Start: 2022-03-30 | End: 2022-03-30 | Stop reason: HOSPADM

## 2022-03-30 RX ORDER — SODIUM CHLORIDE 9 MG/ML
75 INJECTION, SOLUTION INTRAVENOUS CONTINUOUS
Status: DISCONTINUED | OUTPATIENT
Start: 2022-03-30 | End: 2022-03-30 | Stop reason: HOSPADM

## 2022-03-30 RX ORDER — DEXTROMETHORPHAN/PSEUDOEPHED 2.5-7.5/.8
1.2 DROPS ORAL
Status: DISCONTINUED | OUTPATIENT
Start: 2022-03-30 | End: 2022-03-30 | Stop reason: HOSPADM

## 2022-03-30 RX ORDER — ATROPINE SULFATE 0.1 MG/ML
0.5 INJECTION INTRAVENOUS
Status: DISCONTINUED | OUTPATIENT
Start: 2022-03-30 | End: 2022-03-30 | Stop reason: HOSPADM

## 2022-03-30 RX ORDER — LIDOCAINE HYDROCHLORIDE 20 MG/ML
INJECTION, SOLUTION EPIDURAL; INFILTRATION; INTRACAUDAL; PERINEURAL AS NEEDED
Status: DISCONTINUED | OUTPATIENT
Start: 2022-03-30 | End: 2022-03-30 | Stop reason: HOSPADM

## 2022-03-30 RX ORDER — SODIUM CHLORIDE 0.9 % (FLUSH) 0.9 %
5-40 SYRINGE (ML) INJECTION EVERY 8 HOURS
Status: DISCONTINUED | OUTPATIENT
Start: 2022-03-30 | End: 2022-03-30 | Stop reason: HOSPADM

## 2022-03-30 RX ORDER — FLUMAZENIL 0.1 MG/ML
0.2 INJECTION INTRAVENOUS
Status: DISCONTINUED | OUTPATIENT
Start: 2022-03-30 | End: 2022-03-30 | Stop reason: HOSPADM

## 2022-03-30 RX ADMIN — PROPOFOL 30 MG: 10 INJECTION, EMULSION INTRAVENOUS at 12:45

## 2022-03-30 RX ADMIN — PROPOFOL 50 MG: 10 INJECTION, EMULSION INTRAVENOUS at 12:37

## 2022-03-30 RX ADMIN — PROPOFOL 50 MG: 10 INJECTION, EMULSION INTRAVENOUS at 12:41

## 2022-03-30 RX ADMIN — SODIUM CHLORIDE 75 ML/HR: 9 INJECTION, SOLUTION INTRAVENOUS at 12:10

## 2022-03-30 RX ADMIN — LIDOCAINE HYDROCHLORIDE 40 MG: 20 INJECTION, SOLUTION EPIDURAL; INFILTRATION; INTRACAUDAL; PERINEURAL at 12:37

## 2022-03-30 NOTE — H&P
Gastroenterology Outpatient History and Physical    Patient: Matt Sherwood    Physician: Fanta Ya MD    Chief Complaint: ISAI  History of Present Illness: No other GI complaints    History:  Past Medical History:   Diagnosis Date    Arthritis     CAD (coronary artery disease)     6 stents (CARMITA) 3/21/18 [facility]    Diabetes ([facility]     Dizziness     years ago     Dyspepsia and other specified disorders of function of stomach     GERD (gastroesophageal reflux disease)     Hx of cardiac cath 2018    after abnormal stress test 3/13/18    Hypertension complicating diabetes ([facility]     Loss of appetite     Musculoskeletal disorder     back pain    Other ill-defined conditions(799.89)     shingles    Thyroid disease     hypothyroidism      Past Surgical History:   Procedure Laterality Date     [facility] [place]    hysterectomy & 2 c-sections    HX HEENT  1963    tonsils    HX ORTHOPAEDIC Bilateral     carpal tunnel release    HX ORTHOPAEDIC      neck surgery fusion and lower back fusion    HX OTHER SURGICAL      sweat glands removed @ [facility]    HX OTHER SURGICAL      abscess removed from right back    MO CARDIAC SURG PROCEDURE UNLIST  2018    6 stents     MO DRAIN SKIN ABSCESS SIMPLE  12      Social History     Socioeconomic History    Marital status:    Tobacco Use    Smoking status: Former Smoker     Packs/day: 0.50     Years: 25.00     Pack years: 12.50     Quit date: 1999     Years since quittin.2    Smokeless tobacco: Never Used   Substance and Sexual Activity    Alcohol use: Yes     Comment: very rare    Drug use: No    Sexual activity: Never   Social History Narrative    Retired but does part time with [employer].       Family History   Problem Relation Age of Onset    Diabetes Mother     Hypertension Mother     Hypertension Father     Diabetes Father     Stroke Father     Heart Attack Father     Diabetes Sister     Cancer Sister         lung      Patient Active Problem List   Diagnosis Code    LBP (low back pain) M54.50    DM (diabetes mellitus) ([facility] E11.9    Hypercholesterolemia E78.00    Diabetes type 2, uncontrolled ([facility] E11.65    Diabetic autonomic neuropathy associated with type 2 diabetes mellitus [facility] E11.43    Hypovitaminosis D E55.9    Leg pain, bilateral M79.604, M79.605    Essential hypertension I10    Hypothyroidism E03.9    Cervical stenosis of spinal canal M48.02    Cervical stenosis of spine M48.02    CAD (coronary artery disease) I25.10    S/P lumbar spinal fusion Z98.1    Spinal stenosis of lumbar region at multiple levels M48.061    Hypertension complicating diabetes (HCC) E11.59, I15.2    Iron deficiency anemia D50.9       Allergies: Allergies   Allergen Reactions    Penicillins Rash     Medications:   Prior to Admission medications    Medication Sig Start Date End Date Taking? Authorizing Provider   acetaminophen (Tylenol 8 Hour) 650 mg TbER Take 650 mg by mouth every eight (8) hours as needed for Pain. Indications: pain associated with arthritis   Yes Provider, Historical   insulin NPH/insulin regular (NovoLIN 70/30 U-100 Insulin) 100 unit/mL (70-30) injection by SubCUTAneous route. 12units in AM and 10 units at dinner. Yes Provider, Historical   tiZANidine (ZANAFLEX) 4 mg tablet Take 1 tablet by mouth three times daily as needed for muscle spasm 2/17/21  Yes Theo Oshea MD   famotidine (PEPCID) 40 mg tablet Take 1 Tab by mouth daily. 10/12/20  Yes Angie Garg MD   rosuvastatin (CRESTOR) 20 mg tablet Take 1 Tab by mouth nightly. 9/17/20  Yes Angie Garg MD   metFORMIN (GLUCOPHAGE) 500 mg tablet Take 1 Tab by mouth two (2) times daily (with meals). 6/24/20  Yes Angie Garg MD   levothyroxine (SYNTHROID) 112 mcg tablet Take in am 5 days a week  Patient taking differently: 100 mcg.  Take in am 7 days a week 6/24/20  Yes Theo Oshea MD   flash glucose sensor (Clorinda Bun [address] [facility]) kit Use to check blood sugar. DXE11.9 7/19/19  Yes Juan M Adams MD   carvedilol (COREG) 3.125 mg tablet Take 12.5 mg by mouth two (2) times daily (with meals). 11/1/18  Yes Suzie Friend, NP   Blood-Glucose Meter (RELION PRIME METER) misc by Does Not Apply route. Yes Provider, Historical   aspirin delayed-release 81 mg tablet Take 81 mg by mouth daily. Yes Other, MD [place]     Physical Exam:   Vital Signs: Blood pressure (!) 178/80, pulse 88, temperature 97.8 °F (36.6 °C), resp. rate 22, height 5' 4\" (1.626 m), weight 89.8 kg (198 lb), SpO2 99 %, not currently breastfeeding.   General: well developed, well nourished   HEENT: unremarkable   Heart: regular rhythm no mumur    Lungs: clear   Abdominal:  benign   Neurological: unremarkable   Extremities: no edema     Findings/Diagnosis: ISAI  Plan of Care/Planned Procedure: Colonoscopy with conscious/deep sedation    Signed:  Chuyita Valderrama MD 3/30/2022

## 2022-03-30 NOTE — PROGRESS NOTES
Endoscope was pre-cleaned at bedside immediately following procedure by Giovana Joyce RN. Glasses returned to patient immediately post procedure.

## 2022-03-30 NOTE — DISCHARGE INSTRUCTIONS
Adriano Paige  [id number]  1955    COLON DISCHARGE INSTRUCTIONS  Discomfort:  Redness at IV site- apply warm compress to area; if redness or soreness persist- contact your physician  There may be a slight amount of blood passed from the rectum  Gaseous discomfort- walking, belching will help relieve any discomfort  You may not operate a vehicle for 12 hours  You may not engage in an occupation involving machinery or appliances for rest of today  You may not drink alcoholic beverages for at least 12 hours  Avoid making any critical decisions for at least 24 hour  DIET:   Regular diet. - however -  remember your colon is empty and a heavy meal will produce gas. Avoid these foods:  vegetables, fried / greasy foods, carbonated drinks for today  MEDICATION:  Per Medication Reconciliation       ACTIVITY:  You may not resume your normal daily activities until tomorrow AM; it is recommended that you spend the remainder of the day resting -  avoid any strenuous activity. CALL M.D. ANY SIGN OF:   Increasing pain, nausea, vomiting  Abdominal distension (swelling)  New increased bleeding (oral or rectal)  Fever (chills)  Pain in chest area  Bloody discharge from nose or mouth  Shortness of breath    You may  take any Advil, Aspirin, Ibuprofen, Motrin, Aleve, or Goodys for 10 days, ONLY  Tylenol as needed for pain. IMPRESSION:  Impression:    1. Normal colonoscopy through to the cecum  2. Medium sized internal hemorrhoids seen on retroflexion    Recommendations:   1.  No further colonoscopies necessary for screening purposes      Follow-up Instructions:  Telephone # [phone]    Ximena Ryan MD

## 2022-03-30 NOTE — ANESTHESIA POSTPROCEDURE EVALUATION
Procedure(s):  COLONOSCOPY. MAC    Anesthesia Post Evaluation        Patient location during evaluation: PACU  Note status: Adequate. Level of consciousness: responsive to verbal stimuli and sleepy but conscious  Pain management: satisfactory to patient  Airway patency: patent  Anesthetic complications: no  Cardiovascular status: acceptable  Respiratory status: acceptable  Hydration status: acceptable  Comments: +Post-Anesthesia Evaluation and Assessment    Patient: Davida Guajardo MRN: [id number]  SSN: [id number]   YOB: 1955  Age: 74 y.o. Sex: female      Cardiovascular Function/Vital Signs    BP (!) 144/79   Pulse 84   Temp 36.3 °C (97.3 °F)   Resp 16   Ht 5' 4\" (1.626 m)   Wt 89.8 kg (198 lb)   SpO2 97%   Breastfeeding No   BMI 33.99 kg/m²     Patient is status post Procedure(s):  COLONOSCOPY. Nausea/Vomiting: Controlled. Postoperative hydration reviewed and adequate. Pain:  Pain Scale 1: Numeric (0 - 10) (03/30/22 1310)  Pain Intensity 1: 0 (03/30/22 1310)   Managed. Neurological Status: At baseline. Mental Status and Level of Consciousness: Arousable. Pulmonary Status:   O2 Device: None (Room air) (03/30/22 1310)   Adequate oxygenation and airway patent. Complications related to anesthesia: None    Post-anesthesia assessment completed. No concerns.     Signed By: Tata Chung MD    3/30/2022  Post anesthesia nausea and vomiting:  controlled      INITIAL Post-op Vital signs:   Vitals Value Taken Time   /79 03/30/22 1310   Temp 36.3 °C (97.3 °F) 03/30/22 1255   Pulse 84 03/30/22 1310   Resp 16 03/30/22 1310   SpO2 97 % 03/30/22 1310

## 2022-03-30 NOTE — ROUTINE PROCESS
Elton Pastrana  1955  [id number]    Situation:  Verbal report received from: Daniel Chatman  Procedure: Procedure(s):  COLONOSCOPY    Background:    Preoperative diagnosis: Iron deficiency anemia, unspecified iron deficiency anemia type [D50.9]  Postoperative diagnosis: Hemorrhoids    :  Dr. Evonne Olmos  Assistant(s): Endoscopy Technician-1: Radha Kennedy  Endoscopy RN-1: Darrian Montanez    Specimens: * No specimens in log *  H. Pylori  no    Assessment:  Intra-procedure medications     Anesthesia gave intra-procedure sedation and medications, see anesthesia flow sheet yes    Intravenous fluids: NS@ KVO     Vital signs stable     Abdominal assessment: round and soft     Recommendation:  Discharge patient per MD order.   Return to floor  Family or Friend   Permission to share finding with family or friend yes

## 2022-03-30 NOTE — PROCEDURES
NAME:  Ren Stephen   :   1955   MRN:   [id number]     Date/Time:  3/30/2022 12:50 PM    Colonoscopy Operative Report    Procedure Type:   Colonoscopy --diagnostic     Indications:     Iron deficiency anemia  Pre-operative Diagnosis: see indication above  Post-operative Diagnosis:  See findings below  :  Madison Jennings MD  Referring Provider: --Kenyon Corrigan NP    Exam:  Airway: clear, no airway problems anticipated  Heart: RRR, without gallops or rubs  Lungs: clear bilaterally without wheezes, crackles, or rhonchi  Abdomen: soft, nontender, nondistended, bowel sounds present  Mental Status: awake, alert and oriented to person, place and time    Sedation:  MAC anesthesia Propofol  Procedure Details:  After informed consent was obtained with all risks and benefits of procedure explained and preoperative exam completed, the patient was taken to the endoscopy suite and placed in the left lateral decubitus position. Upon sequential sedation as per above, a digital rectal exam was performed demonstrating internal hemorrhoids. The Olympus videocolonoscope  was inserted in the rectum and carefully advanced to the cecum, which was identified by the ileocecal valve and appendiceal orifice. The quality of preparation was good. The colonoscope was slowly withdrawn with careful evaluation between folds. Retroflexion in the rectum was completed demonstrating internal hemorrhoids. Findings:   1. Normal colonoscopy through to the cecum  2. Medium sized internal hemorrhoids seen on retroflexion    Specimen Removed:  None  Complications: None. EBL:  None. Impression:    1. Normal colonoscopy through to the cecum  2. Medium sized internal hemorrhoids seen on retroflexion    Recommendations:   1. No further colonoscopies necessary for screening purposes      Discharge Disposition:  Home in the company of a  when able to ambulate.       Nan Cha MD

## 2022-06-28 ENCOUNTER — OFFICE VISIT (OUTPATIENT)
Dept: ONCOLOGY | Age: 67
End: 2022-06-28
Payer: MEDICARE

## 2022-06-28 VITALS
DIASTOLIC BLOOD PRESSURE: 70 MMHG | WEIGHT: 190 LBS | BODY MASS INDEX: 32.44 KG/M2 | TEMPERATURE: 99.1 F | RESPIRATION RATE: 17 BRPM | HEIGHT: 64 IN | OXYGEN SATURATION: 96 % | HEART RATE: 87 BPM | SYSTOLIC BLOOD PRESSURE: 124 MMHG

## 2022-06-28 DIAGNOSIS — D64.9 NORMOCYTIC ANEMIA: Primary | ICD-10-CM

## 2022-06-28 PROCEDURE — G8510 SCR DEP NEG, NO PLAN REQD: HCPCS | Performed by: STUDENT IN AN ORGANIZED HEALTH CARE EDUCATION/TRAINING PROGRAM

## 2022-06-28 PROCEDURE — G8536 NO DOC ELDER MAL SCRN: HCPCS | Performed by: STUDENT IN AN ORGANIZED HEALTH CARE EDUCATION/TRAINING PROGRAM

## 2022-06-28 PROCEDURE — G8754 DIAS BP LESS 90: HCPCS | Performed by: STUDENT IN AN ORGANIZED HEALTH CARE EDUCATION/TRAINING PROGRAM

## 2022-06-28 PROCEDURE — G8400 PT W/DXA NO RESULTS DOC: HCPCS | Performed by: STUDENT IN AN ORGANIZED HEALTH CARE EDUCATION/TRAINING PROGRAM

## 2022-06-28 PROCEDURE — 3017F COLORECTAL CA SCREEN DOC REV: CPT | Performed by: STUDENT IN AN ORGANIZED HEALTH CARE EDUCATION/TRAINING PROGRAM

## 2022-06-28 PROCEDURE — G8427 DOCREV CUR MEDS BY ELIG CLIN: HCPCS | Performed by: STUDENT IN AN ORGANIZED HEALTH CARE EDUCATION/TRAINING PROGRAM

## 2022-06-28 PROCEDURE — 1090F PRES/ABSN URINE INCON ASSESS: CPT | Performed by: STUDENT IN AN ORGANIZED HEALTH CARE EDUCATION/TRAINING PROGRAM

## 2022-06-28 PROCEDURE — 99213 OFFICE O/P EST LOW 20 MIN: CPT | Performed by: STUDENT IN AN ORGANIZED HEALTH CARE EDUCATION/TRAINING PROGRAM

## 2022-06-28 PROCEDURE — 1123F ACP DISCUSS/DSCN MKR DOCD: CPT | Performed by: STUDENT IN AN ORGANIZED HEALTH CARE EDUCATION/TRAINING PROGRAM

## 2022-06-28 PROCEDURE — G8752 SYS BP LESS 140: HCPCS | Performed by: STUDENT IN AN ORGANIZED HEALTH CARE EDUCATION/TRAINING PROGRAM

## 2022-06-28 PROCEDURE — 1101F PT FALLS ASSESS-DOCD LE1/YR: CPT | Performed by: STUDENT IN AN ORGANIZED HEALTH CARE EDUCATION/TRAINING PROGRAM

## 2022-06-28 PROCEDURE — G8417 CALC BMI ABV UP PARAM F/U: HCPCS | Performed by: STUDENT IN AN ORGANIZED HEALTH CARE EDUCATION/TRAINING PROGRAM

## 2022-06-28 RX ORDER — ACETAMINOPHEN 500 MG
TABLET ORAL DAILY
COMMUNITY

## 2022-06-28 NOTE — PROGRESS NOTES
[facility] [place] at [address] [facility], [address]  W: [phone] F: [phone]      Reason for Visit:   Alexi Bello is a 69 y.o. female who is seen in consultation at the request of Dr. Sherri Worley for evaluation of severe normocytic anemia. Hematology / Oncology Treatment History:     Hematological/Oncological Diagnosis: Severe normocytic anemia    Date of Diagnosis: Greater than 3 years    Treatment course: Oral iron      History of Present Illness:     Very pleasant 58-year-old female with relevant medical history was significant for coronary artery disease, diabetes mellitus type 2, hypertension, hypothyroidism, GERD, dyspepsia, presents for evaluation and treatment of chronic normocytic anemia that is been present for at least 3 years. Most recently, the patient did have labs done through her PCP that showed the following:     CBC that shows hemoglobin of 7.7 g/dL, hematocrit of 25%, WBC count of 9.2, RDW of 14.4, MCV of 90, platelets of 942. Completed on October 4, 2021. Creatinine is normal at 0.93, as is calcium at 9.4. LFTs are normal with AST of 11, ALT of 8, total bilirubin of less than 0.2. Iron saturation was 7% with a ferritin of 8    The patient has tried oral supplementation with iron but has significant's symptoms of nausea, abdominal distress that limits her tolerability of oral iron. Otherwise, no other constitutional symptoms of be concerning for hematological malignancy. She denies any night sweats, unintentional weight loss. She does have minor ice cravings but otherwise is without additional symptoms. Family history reviewed, notable only for lung cancer in her sister diagnosed small cell. Social history reviewed, noncontributory    Positive ROS findings include: Fatigue  Review of Systems: A complete review of systems was obtained, negative except as described above.   12/27/21: Patient got Jocelin Lands on 21 and 21 with good toleration. Reports marked improvement in prior fatigue. No new bleeding or melena. Patient in good spirits, feels well. Interval History:     22:  Patient doing well, she reports that she still has some fatigue but thinks that some of her symptoms might be a consequence of thyroid dysfunction. Overall no ice cravings or any other constitutional symptoms that are concerning for persistent iron deficiency. Labs reviewed with the patient today.     Past Medical History:   Diagnosis Date    Arthritis     CAD (coronary artery disease)     6 st[facility]CARMITA) [facility]    Diabetes ([facility]     Dizziness     years ago     Dyspepsia and other specified disorders of function of stomach     GERD (gastroesophageal reflux disease)     Hx of cardiac cath 2018    after abnormal stress test 3/13/18    Hypertension complicating diabetes ([address]     Loss of appetite     Musculoskeletal disorder     back pain    Other ill-defined conditions(799.89)     shingles    Thyroid disease     hypothyroidism      Past Surgical History:   Procedure Laterality Date    COLONOSCOPY N/A 3/30/2022    COLONOSCOPY performed by Dick Nguyen MD at [address] [place] [address]    hysterectomy & 2 c-sections    HX HEENT  1963    tonsils    HX ORTHOPAEDIC Bilateral     carpal tunnel release    HX ORTHOPAEDIC      neck surgery fusion and lower back fusion    HX OTHER SURGICAL      sweat glands [facility]    HX OTHER SURGICAL      abscess removed from right back    [place] CARDIAC SURG PROCEDURE UNLIST  2018    6 stents     NM DRAIN SKIN ABSCESS SIMPLE  12      Social History     Tobacco Use    Smoking status: Former Smoker     Packs/day: 0.50     Years: 25.00     Pack years: 12.50     Quit date: 1999     Years since quittin.5    Smokeless tobacco: Never Used   Substance Use Topics    Alcohol use: Yes     Comment: very rare Family History   Problem Relation Age of Onset    Diabetes Mother     Hypertension Mother     Hypertension Father     Diabetes Father     Stroke Father     Heart Attack Father     Diabetes Sister     Cancer Sister         lung     Current Outpatient Medications   Medication Sig    cholecalciferol (VITAMIN D3) (2,000 UNITS /50 MCG) cap capsule Take  by mouth daily.  acetaminophen (Tylenol 8 Hour) 650 mg TbER Take 650 mg by mouth every eight (8) hours as needed for Pain. Indications: pain associated with arthritis    insulin NPH/insulin regular (NovoLIN 70/30 U-100 Insulin) 100 unit/mL (70-30) injection by SubCUTAneous route. 12units in AM and 10 units at dinner.  tiZANidine (ZANAFLEX) 4 mg tablet Take 1 tablet by mouth three times daily as needed for muscle spasm    famotidine (PEPCID) 40 mg tablet Take 1 Tab by mouth daily.  rosuvastatin (CRESTOR) 20 mg tablet Take 1 Tab by mouth nightly.  metFORMIN (GLUCOPHAGE) 500 mg tablet Take 1 Tab by mouth two (2) times daily (with meals).  levothyroxine (SYNTHROID) 112 mcg tablet Take in am 5 days a week (Patient taking differently: 100 mcg. Take in am 7 days a week)    flash glucose sensor (SmailexYLE TRES 14 DAY SENSOR) kit Use to check blood sugar. DXE11.9    carvedilol (COREG) 3.125 mg tablet Take 12.5 mg by mouth two (2) times daily (with meals).  Blood-Glucose Meter (RELION PRIME METER) misc by Does Not Apply route.  aspirin delayed-release 81 mg tablet Take 81 mg by mouth daily. No current facility-administered medications for this visit.       Allergies   Allergen Reactions    Penicillins Rash            Physical Exam:     Visit Vitals  /70 (BP 1 Location: Left upper arm, BP Patient Position: Sitting)   Pulse 87   Temp 99.1 °F (37.3 °C)   Resp 17   Ht 5' 4\" (1.626 m)   Wt 190 lb (86.2 kg)   SpO2 96%   BMI 32.61 kg/m²     ECOG PS: 1  General: alert, cooperative, no distress   Mental  status: normal mood, behavior, speech, dress, motor activity, and thought processes, able to follow commands   HENT: NCAT   Neck: no visualized mass   Resp: no respiratory distress   Neuro: no gross deficits   Skin: no discoloration or lesions of concern on visible areas   Psychiatric: normal affect, consistent with stated mood, no evidence of hallucinations         Results:     Lab Results   Component Value Date/Time    WBC 9.0 06/23/2022 10:07 AM    HGB 11.1 (L) 06/23/2022 10:07 AM    HCT 34.4 (L) 06/23/2022 10:07 AM    PLATELET 201 87/34/8266 10:07 AM    MCV 93.0 06/23/2022 10:07 AM    ABS. NEUTROPHILS 5.4 06/23/2022 10:07 AM    HGB (POC) 10.3 (A) 01/20/2020 10:59 AM    Hematocrit (POC) 23 (L) 05/07/2019 12:21 PM    HCT (POC) 32.2 (A) 01/20/2020 10:59 AM     Lab Results   Component Value Date/Time    Sodium 137 06/23/2022 10:07 AM    Potassium 4.2 06/23/2022 10:07 AM    Chloride 105 06/23/2022 10:07 AM    CO2 26 06/23/2022 10:07 AM    Glucose 302 (H) 06/23/2022 10:07 AM    BUN 10 06/23/2022 10:07 AM    Creatinine 1.04 (H) 06/23/2022 10:07 AM    GFR est AA >60 06/23/2022 10:07 AM    GFR est non-AA 53 (L) 06/23/2022 10:07 AM    Calcium 9.4 06/23/2022 10:07 AM    Sodium (POC) 146 (H) 05/07/2019 12:21 PM    Potassium (POC) 3.3 (L) 05/07/2019 12:21 PM    Chloride (POC) 103 05/07/2019 12:21 PM    Glucose (POC) 250 (H) 03/30/2022 12:08 PM    BUN (POC) 7 (L) 05/07/2019 12:21 PM    Creatinine (POC) 0.7 05/07/2019 12:21 PM    Calcium, ionized (POC) 1.16 05/07/2019 12:21 PM     Lab Results   Component Value Date/Time    Bilirubin, total 0.3 06/23/2022 10:07 AM    ALT (SGPT) 17 06/23/2022 10:07 AM    Alk. phosphatase 148 (H) 06/23/2022 10:07 AM    Protein, total 7.4 06/23/2022 10:07 AM    Albumin 4.0 06/23/2022 10:07 AM    Globulin 3.4 06/23/2022 10:07 AM       Assessment and Recommendations:      # Normocytic Anemia, due to iron deficiency anemia    - Repeat CBC and iron profile today shows no evidence of severe iron deficiency.   Hemoglobin is near normal at 11.1.  -Colonoscopy in March showed only internal hemorrhoids  - received IV iron with Injectafer on 11/2/21 and 11/9/21    -I educated the patient on the importance of increasing dietary intake of iron-containing foods.    -Hold off on additional IV iron for now    -Plan for follow-up in about 6 months with repeat CBC, CMP, iron studies      Signed By: Kiana Mcbride MD      Attending Medical Oncologist   [facility]

## 2022-06-28 NOTE — PROGRESS NOTES
Elias Hodge is a 69 y.o. female  Here today for Anemia f/u. Pt denies any bruising, or abnormal bleeding or headaches. Chief Complaint   Patient presents with    Follow-up    Anemia     1. Have you been to the ER, urgent care clinic since your last visit? Hospitalized since your last visit? No    2. Have you seen or consulted any other health care providers outside of the [address] [place] [address] since your last visit? Include any pap smears or colon screening.  No

## 2022-08-22 ENCOUNTER — TRANSCRIBE ORDER (OUTPATIENT)
Dept: SCHEDULING | Age: 67
End: 2022-08-22

## 2022-08-22 DIAGNOSIS — Z12.31 SCREENING MAMMOGRAM FOR HIGH-RISK PATIENT: Primary | ICD-10-CM

## 2022-08-22 DIAGNOSIS — Z78.0 MENOPAUSE: Primary | ICD-10-CM

## 2022-12-28 ENCOUNTER — VIRTUAL VISIT (OUTPATIENT)
Dept: ONCOLOGY | Age: 67
End: 2022-12-28
Payer: MEDICARE

## 2022-12-28 DIAGNOSIS — D53.9 MACROCYTIC ANEMIA: Primary | ICD-10-CM

## 2022-12-28 PROCEDURE — G8432 DEP SCR NOT DOC, RNG: HCPCS | Performed by: STUDENT IN AN ORGANIZED HEALTH CARE EDUCATION/TRAINING PROGRAM

## 2022-12-28 PROCEDURE — G8536 NO DOC ELDER MAL SCRN: HCPCS | Performed by: STUDENT IN AN ORGANIZED HEALTH CARE EDUCATION/TRAINING PROGRAM

## 2022-12-28 PROCEDURE — G8417 CALC BMI ABV UP PARAM F/U: HCPCS | Performed by: STUDENT IN AN ORGANIZED HEALTH CARE EDUCATION/TRAINING PROGRAM

## 2022-12-28 PROCEDURE — G9899 SCRN MAM PERF RSLTS DOC: HCPCS | Performed by: STUDENT IN AN ORGANIZED HEALTH CARE EDUCATION/TRAINING PROGRAM

## 2022-12-28 PROCEDURE — 1090F PRES/ABSN URINE INCON ASSESS: CPT | Performed by: STUDENT IN AN ORGANIZED HEALTH CARE EDUCATION/TRAINING PROGRAM

## 2022-12-28 PROCEDURE — 3017F COLORECTAL CA SCREEN DOC REV: CPT | Performed by: STUDENT IN AN ORGANIZED HEALTH CARE EDUCATION/TRAINING PROGRAM

## 2022-12-28 PROCEDURE — G8400 PT W/DXA NO RESULTS DOC: HCPCS | Performed by: STUDENT IN AN ORGANIZED HEALTH CARE EDUCATION/TRAINING PROGRAM

## 2022-12-28 PROCEDURE — G8427 DOCREV CUR MEDS BY ELIG CLIN: HCPCS | Performed by: STUDENT IN AN ORGANIZED HEALTH CARE EDUCATION/TRAINING PROGRAM

## 2022-12-28 PROCEDURE — 1123F ACP DISCUSS/DSCN MKR DOCD: CPT | Performed by: STUDENT IN AN ORGANIZED HEALTH CARE EDUCATION/TRAINING PROGRAM

## 2022-12-28 PROCEDURE — 99213 OFFICE O/P EST LOW 20 MIN: CPT | Performed by: STUDENT IN AN ORGANIZED HEALTH CARE EDUCATION/TRAINING PROGRAM

## 2022-12-28 PROCEDURE — 1101F PT FALLS ASSESS-DOCD LE1/YR: CPT | Performed by: STUDENT IN AN ORGANIZED HEALTH CARE EDUCATION/TRAINING PROGRAM

## 2022-12-28 NOTE — PROGRESS NOTES
[facility] at [address] [facility] [address] [facility]  W: [phone] F: [phone]      Reason for Visit:   Dallis Saint is a 69 y.o. female who is seen in consultation at the request of Dr. Tarsha Ceron for evaluation of severe normocytic anemia. Hematology / Oncology Treatment History:     Hematological/Oncological Diagnosis: Severe normocytic anemia    Date of Diagnosis: Greater than 3 years    Treatment course: Oral iron    History of Present Illness:     Very pleasant 68-year-old female with relevant medical history was significant for coronary artery disease, diabetes mellitus type 2, hypertension, hypothyroidism, GERD, dyspepsia, presents for evaluation and treatment of chronic normocytic anemia that is been present for at least 3 years. Most recently, the patient did have labs done through her PCP that showed the following:     CBC that shows hemoglobin of 7.7 g/dL, hematocrit of 25%, WBC count of 9.2, RDW of 14.4, MCV of 90, platelets of 955. Completed on October 4, 2021. Creatinine is normal at 0.93, as is calcium at 9.4. LFTs are normal with AST of 11, ALT of 8, total bilirubin of less than 0.2. Iron saturation was 7% with a ferritin of 8    The patient has tried oral supplementation with iron but has significant's symptoms of nausea, abdominal distress that limits her tolerability of oral iron. Otherwise, no other constitutional symptoms of be concerning for hematological malignancy. She denies any night sweats, unintentional weight loss. She does have minor ice cravings but otherwise is without additional symptoms. Family history reviewed, notable only for lung cancer in her sister diagnosed small cell. Social history reviewed, noncontributory    Positive ROS findings include: Fatigue  Review of Systems: A complete review of systems was obtained, negative except as described above.     12/27/21: Patient got Mine Laity on 11/2/21 and 11/9/21 with good toleration. Reports marked improvement in prior fatigue. No new bleeding or melena. Patient in good spirits, feels well. 6/28/22:  Patient doing well, she reports that she still has some fatigue but thinks that some of her symptoms might be a consequence of thyroid dysfunction. Overall no ice cravings or any other constitutional symptoms that are concerning for persistent iron deficiency. Labs reviewed with the patient today. Interval History:   Ana Holder, who was evaluated through a synchronous (real-time) audio-video encounter, and/or her healthcare decision maker, is aware that it is a billable service, which includes applicable co-pays, with coverage as determined by her insurance carrier. She provided verbal consent to proceed and patient identification was verified. This visit was conducted pursuant to the emergency declaration under the [address] [place] [address], [address] [place] [address] authority and the [employer] Act. A caregiver was present when appropriate. Ability to conduct physical exam was limited. The patient was located at: Home: [address] [place] [phone]  The provider was located at: Facility (Appt Department): [address] [place] [address] [address]    --Ellen Taylor MD on 12/28/2022 at 10:02 AM      No new clinical worsening since last evaluation. The patient reports that she has not had any interval hospitalizations, or new medical problems. Review of Systems   Constitutional: Negative. HENT: Negative. Eyes: Negative. Respiratory: Negative. Cardiovascular: Negative. Gastrointestinal: Negative. Genitourinary: Negative. Musculoskeletal: Negative. Skin: Negative.           Past Medical History:   Diagnosis Date    Arthritis     CAD (coronary artery disease)     6 stents (CARMITA) 3/21/18 at [facility]    Diabetes ([address]     Dizziness     years ago     Dyspepsia and other specified disorders of function of stomach     GERD (gastroesophageal reflux disease)     Hx of cardiac cath 2018    after abnormal stress test 3/13/18    Hypertension complicating diabetes ([address]     Loss of appetite     Musculoskeletal disorder     back pain    Other ill-defined conditions(799.89)     shingles    Thyroid disease     hypothyroidism      Past Surgical History:   Procedure Laterality Date    COLONOSCOPY N/A 3/30/2022    COLONOSCOPY performed by Abdifatah Samuel MD at [address] [place] [address] Dr    hysterectomy & 2 c-sections    HX HEENT  1963    tonsils    HX ORTHOPAEDIC Bilateral     carpal tunnel release    HX ORTHOPAEDIC      neck surgery fusion and lower back fusion    HX OTHER SURGICAL      sweat glands removed @ [facility]    HX OTHER SURGICAL      abscess removed from right back    SD CARDIAC SURG PROCEDURE UNLIST  2018    6 stents     SD DRAIN SKIN ABSCESS SIMPLE  12      Social History     Tobacco Use    Smoking status: Former     Packs/day: 0.50     Years: 25.00     Pack years: 12.50     Types: Cigarettes     Quit date: 1999     Years since quittin.0    Smokeless tobacco: Never   Substance Use Topics    Alcohol use: Yes     Comment: very rare      Family History   Problem Relation Age of Onset    Diabetes Mother     Hypertension Mother     Hypertension Father     Diabetes Father     Stroke Father     Heart Attack Father     Diabetes Sister     Cancer Sister         lung     Current Outpatient Medications   Medication Sig    cholecalciferol (VITAMIN D3) (2,000 UNITS /50 MCG) cap capsule Take  by mouth daily. acetaminophen (Tylenol 8 Hour) 650 mg TbER Take 650 mg by mouth every eight (8) hours as needed for Pain. Indications: pain associated with arthritis    insulin NPH/insulin regular (NovoLIN 70/30 U-100 Insulin) 100 unit/mL (70-30) injection by SubCUTAneous route.  12units in AM and 10 units at dinner. tiZANidine (ZANAFLEX) 4 mg tablet Take 1 tablet by mouth three times daily as needed for muscle spasm    famotidine (PEPCID) 40 mg tablet Take 1 Tab by mouth daily. rosuvastatin (CRESTOR) 20 mg tablet Take 1 Tab by mouth nightly. metFORMIN (GLUCOPHAGE) 500 mg tablet Take 1 Tab by mouth two (2) times daily (with meals). levothyroxine (SYNTHROID) 112 mcg tablet Take in am 5 days a week (Patient taking differently: 100 mcg. Take in am 7 days a week)    flash glucose sensor ([employer] TRES 14 DAY SENSOR) kit Use to check blood sugar. DXE11.9    carvedilol (COREG) 3.125 mg tablet Take 12.5 mg by mouth two (2) times daily (with meals). Blood-Glucose Meter (RELION PRIME METER) misc by Does Not Apply route. aspirin delayed-release 81 mg tablet Take 81 mg by mouth daily. No current facility-administered medications for this visit. Allergies   Allergen Reactions    Penicillins Rash            Physical Exam:     There were no vitals taken for this visit. ECOG PS: 1  General: alert, cooperative, no distress   Mental  status: normal mood, behavior, speech, dress, motor activity, and thought processes, able to follow commands   HENT: NCAT   Neck: no visualized mass   Resp: no respiratory distress   Neuro: no gross deficits   Skin: no discoloration or lesions of concern on visible areas   Psychiatric: normal affect, consistent with stated mood, no evidence of hallucinations         Results:     Lab Results   Component Value Date/Time    WBC 9.4 12/19/2022 11:50 AM    HGB 10.9 (L) 12/19/2022 11:50 AM    HCT 33.5 (L) 12/19/2022 11:50 AM    PLATELET 580 22/20/2077 11:50 AM    .0 (H) 12/19/2022 11:50 AM    ABS.  NEUTROPHILS 4.8 12/19/2022 11:50 AM    HGB (POC) 10.3 (A) 01/20/2020 10:59 AM    Hematocrit (POC) 23 (L) 05/07/2019 12:21 PM    HCT (POC) 32.2 (A) 01/20/2020 10:59 AM     Lab Results   Component Value Date/Time    Sodium 139 12/19/2022 11:50 AM    Potassium 5.0 12/19/2022 11:50 AM    Chloride 108 12/19/2022 11:50 AM    CO2 27 12/19/2022 11:50 AM    Glucose 196 (H) 12/19/2022 11:50 AM    BUN 12 12/19/2022 11:50 AM    Creatinine 0.99 12/19/2022 11:50 AM    GFR est AA >60 06/23/2022 10:07 AM    GFR est non-AA 53 (L) 06/23/2022 10:07 AM    Calcium 9.8 12/19/2022 11:50 AM    Sodium (POC) 146 (H) 05/07/2019 12:21 PM    Potassium (POC) 3.3 (L) 05/07/2019 12:21 PM    Chloride (POC) 103 05/07/2019 12:21 PM    Glucose (POC) 250 (H) 03/30/2022 12:08 PM    BUN (POC) 7 (L) 05/07/2019 12:21 PM    Creatinine (POC) 0.7 05/07/2019 12:21 PM    Calcium, ionized (POC) 1.16 05/07/2019 12:21 PM     Lab Results   Component Value Date/Time    Bilirubin, total 0.2 12/19/2022 11:50 AM    ALT (SGPT) 15 12/19/2022 11:50 AM    Alk. phosphatase 113 12/19/2022 11:50 AM    Protein, total 7.6 12/19/2022 11:50 AM    Albumin 4.1 12/19/2022 11:50 AM    Globulin 3.5 12/19/2022 11:50 AM       Assessment and Recommendations:      # Normocytic Anemia, due to iron deficiency anemia    - Repeat CBC and iron profile today shows no evidence of severe iron deficiency.   Hemoglobin is near normal at 11.1.  -Colonoscopy in March showed only internal hemorrhoids  - received IV iron with Injectafer on 11/2/21 and 11/9/21    - I educated the patient on the importance of increasing dietary intake of iron-containing foods.    -Hold off on additional IV iron for now    -Plan for follow-up in about 6 months with repeat CBC, CMP, iron studies      Signed By: Jami Reddy MD      Attending Medical Oncologist   [facility]

## 2023-01-26 ENCOUNTER — TELEPHONE (OUTPATIENT)
Dept: ONCOLOGY | Age: 68
End: 2023-01-26

## 2023-01-26 RX ORDER — DIPHENHYDRAMINE HYDROCHLORIDE 50 MG/ML
50 INJECTION, SOLUTION INTRAMUSCULAR; INTRAVENOUS AS NEEDED
Start: 2023-02-07

## 2023-01-26 RX ORDER — ALBUTEROL SULFATE 0.83 MG/ML
2.5 SOLUTION RESPIRATORY (INHALATION) AS NEEDED
Start: 2023-02-28

## 2023-01-26 RX ORDER — EPINEPHRINE 1 MG/ML
0.3 INJECTION, SOLUTION, CONCENTRATE INTRAVENOUS AS NEEDED
OUTPATIENT
Start: 2023-02-28

## 2023-01-26 RX ORDER — SODIUM CHLORIDE 9 MG/ML
5-250 INJECTION, SOLUTION INTRAVENOUS AS NEEDED
OUTPATIENT
Start: 2023-02-07

## 2023-01-26 RX ORDER — SODIUM CHLORIDE 9 MG/ML
5-250 INJECTION, SOLUTION INTRAVENOUS AS NEEDED
OUTPATIENT
Start: 2023-02-21

## 2023-01-26 RX ORDER — SODIUM CHLORIDE 9 MG/ML
5-40 INJECTION INTRAVENOUS AS NEEDED
OUTPATIENT
Start: 2023-02-28

## 2023-01-26 RX ORDER — ALBUTEROL SULFATE 0.83 MG/ML
2.5 SOLUTION RESPIRATORY (INHALATION) AS NEEDED
Start: 2023-02-14

## 2023-01-26 RX ORDER — ACETAMINOPHEN 325 MG/1
650 TABLET ORAL AS NEEDED
Start: 2023-02-14

## 2023-01-26 RX ORDER — SODIUM CHLORIDE 9 MG/ML
5-40 INJECTION INTRAVENOUS AS NEEDED
OUTPATIENT
Start: 2023-02-07

## 2023-01-26 RX ORDER — DIPHENHYDRAMINE HYDROCHLORIDE 50 MG/ML
50 INJECTION, SOLUTION INTRAMUSCULAR; INTRAVENOUS AS NEEDED
Start: 2023-03-07

## 2023-01-26 RX ORDER — SODIUM CHLORIDE 9 MG/ML
5-40 INJECTION INTRAVENOUS AS NEEDED
OUTPATIENT
Start: 2023-02-21

## 2023-01-26 RX ORDER — ALBUTEROL SULFATE 0.83 MG/ML
2.5 SOLUTION RESPIRATORY (INHALATION) AS NEEDED
Start: 2023-03-07

## 2023-01-26 RX ORDER — SODIUM CHLORIDE 9 MG/ML
5-250 INJECTION, SOLUTION INTRAVENOUS AS NEEDED
OUTPATIENT
Start: 2023-02-14

## 2023-01-26 RX ORDER — ONDANSETRON 2 MG/ML
8 INJECTION INTRAMUSCULAR; INTRAVENOUS AS NEEDED
OUTPATIENT
Start: 2023-02-07

## 2023-01-26 RX ORDER — SODIUM CHLORIDE 9 MG/ML
5-250 INJECTION, SOLUTION INTRAVENOUS AS NEEDED
OUTPATIENT
Start: 2023-02-28

## 2023-01-26 RX ORDER — EPINEPHRINE 1 MG/ML
0.3 INJECTION, SOLUTION, CONCENTRATE INTRAVENOUS AS NEEDED
OUTPATIENT
Start: 2023-02-07

## 2023-01-26 RX ORDER — SODIUM CHLORIDE 9 MG/ML
5-40 INJECTION INTRAVENOUS AS NEEDED
OUTPATIENT
Start: 2023-03-07

## 2023-01-26 RX ORDER — DIPHENHYDRAMINE HYDROCHLORIDE 50 MG/ML
25 INJECTION, SOLUTION INTRAMUSCULAR; INTRAVENOUS AS NEEDED
Start: 2023-02-21

## 2023-01-26 RX ORDER — HYDROCORTISONE SODIUM SUCCINATE 100 MG/2ML
100 INJECTION, POWDER, FOR SOLUTION INTRAMUSCULAR; INTRAVENOUS AS NEEDED
OUTPATIENT
Start: 2023-02-21

## 2023-01-26 RX ORDER — ONDANSETRON 2 MG/ML
8 INJECTION INTRAMUSCULAR; INTRAVENOUS AS NEEDED
OUTPATIENT
Start: 2023-02-14

## 2023-01-26 RX ORDER — ACETAMINOPHEN 325 MG/1
650 TABLET ORAL AS NEEDED
Start: 2023-02-07

## 2023-01-26 RX ORDER — EPINEPHRINE 1 MG/ML
0.3 INJECTION, SOLUTION, CONCENTRATE INTRAVENOUS AS NEEDED
OUTPATIENT
Start: 2023-02-14

## 2023-01-26 RX ORDER — SODIUM CHLORIDE 0.9 % (FLUSH) 0.9 %
5-40 SYRINGE (ML) INJECTION AS NEEDED
OUTPATIENT
Start: 2023-02-07

## 2023-01-26 RX ORDER — SODIUM CHLORIDE 0.9 % (FLUSH) 0.9 %
5-40 SYRINGE (ML) INJECTION AS NEEDED
OUTPATIENT
Start: 2023-03-07

## 2023-01-26 RX ORDER — DIPHENHYDRAMINE HYDROCHLORIDE 50 MG/ML
25 INJECTION, SOLUTION INTRAMUSCULAR; INTRAVENOUS AS NEEDED
Start: 2023-02-14

## 2023-01-26 RX ORDER — DIPHENHYDRAMINE HYDROCHLORIDE 50 MG/ML
25 INJECTION, SOLUTION INTRAMUSCULAR; INTRAVENOUS AS NEEDED
Start: 2023-03-07

## 2023-01-26 RX ORDER — HEPARIN 100 UNIT/ML
500 SYRINGE INTRAVENOUS AS NEEDED
Start: 2023-02-28

## 2023-01-26 RX ORDER — SODIUM CHLORIDE 0.9 % (FLUSH) 0.9 %
5-40 SYRINGE (ML) INJECTION AS NEEDED
OUTPATIENT
Start: 2023-02-21

## 2023-01-26 RX ORDER — ONDANSETRON 2 MG/ML
8 INJECTION INTRAMUSCULAR; INTRAVENOUS AS NEEDED
OUTPATIENT
Start: 2023-02-21

## 2023-01-26 RX ORDER — SODIUM CHLORIDE 9 MG/ML
5-250 INJECTION, SOLUTION INTRAVENOUS AS NEEDED
OUTPATIENT
Start: 2023-03-07

## 2023-01-26 RX ORDER — DIPHENHYDRAMINE HYDROCHLORIDE 50 MG/ML
50 INJECTION, SOLUTION INTRAMUSCULAR; INTRAVENOUS AS NEEDED
Start: 2023-02-14

## 2023-01-26 RX ORDER — ALBUTEROL SULFATE 0.83 MG/ML
2.5 SOLUTION RESPIRATORY (INHALATION) AS NEEDED
Start: 2023-02-21

## 2023-01-26 RX ORDER — DIPHENHYDRAMINE HYDROCHLORIDE 50 MG/ML
25 INJECTION, SOLUTION INTRAMUSCULAR; INTRAVENOUS AS NEEDED
Start: 2023-02-28

## 2023-01-26 RX ORDER — ACETAMINOPHEN 325 MG/1
650 TABLET ORAL AS NEEDED
Start: 2023-02-28

## 2023-01-26 RX ORDER — EPINEPHRINE 1 MG/ML
0.3 INJECTION, SOLUTION, CONCENTRATE INTRAVENOUS AS NEEDED
OUTPATIENT
Start: 2023-03-07

## 2023-01-26 RX ORDER — HYDROCORTISONE SODIUM SUCCINATE 100 MG/2ML
100 INJECTION, POWDER, FOR SOLUTION INTRAMUSCULAR; INTRAVENOUS AS NEEDED
OUTPATIENT
Start: 2023-02-07

## 2023-01-26 RX ORDER — DIPHENHYDRAMINE HYDROCHLORIDE 50 MG/ML
50 INJECTION, SOLUTION INTRAMUSCULAR; INTRAVENOUS AS NEEDED
Start: 2023-02-21

## 2023-01-26 RX ORDER — ACETAMINOPHEN 325 MG/1
650 TABLET ORAL AS NEEDED
Start: 2023-03-07

## 2023-01-26 RX ORDER — ONDANSETRON 2 MG/ML
8 INJECTION INTRAMUSCULAR; INTRAVENOUS AS NEEDED
OUTPATIENT
Start: 2023-03-07

## 2023-01-26 RX ORDER — HYDROCORTISONE SODIUM SUCCINATE 100 MG/2ML
100 INJECTION, POWDER, FOR SOLUTION INTRAMUSCULAR; INTRAVENOUS AS NEEDED
OUTPATIENT
Start: 2023-02-28

## 2023-01-26 RX ORDER — HEPARIN 100 UNIT/ML
500 SYRINGE INTRAVENOUS AS NEEDED
Start: 2023-03-07

## 2023-01-26 RX ORDER — HEPARIN 100 UNIT/ML
500 SYRINGE INTRAVENOUS AS NEEDED
Start: 2023-02-14

## 2023-01-26 RX ORDER — SODIUM CHLORIDE 9 MG/ML
5-40 INJECTION INTRAVENOUS AS NEEDED
OUTPATIENT
Start: 2023-02-14

## 2023-01-26 RX ORDER — ONDANSETRON 2 MG/ML
8 INJECTION INTRAMUSCULAR; INTRAVENOUS AS NEEDED
OUTPATIENT
Start: 2023-02-28

## 2023-01-26 RX ORDER — HYDROCORTISONE SODIUM SUCCINATE 100 MG/2ML
100 INJECTION, POWDER, FOR SOLUTION INTRAMUSCULAR; INTRAVENOUS AS NEEDED
OUTPATIENT
Start: 2023-03-07

## 2023-01-26 RX ORDER — SODIUM CHLORIDE 0.9 % (FLUSH) 0.9 %
5-40 SYRINGE (ML) INJECTION AS NEEDED
OUTPATIENT
Start: 2023-02-14

## 2023-01-26 RX ORDER — ACETAMINOPHEN 325 MG/1
650 TABLET ORAL AS NEEDED
Start: 2023-02-21

## 2023-01-26 RX ORDER — DIPHENHYDRAMINE HYDROCHLORIDE 50 MG/ML
50 INJECTION, SOLUTION INTRAMUSCULAR; INTRAVENOUS AS NEEDED
Start: 2023-02-28

## 2023-01-26 RX ORDER — ALBUTEROL SULFATE 0.83 MG/ML
2.5 SOLUTION RESPIRATORY (INHALATION) AS NEEDED
Start: 2023-02-07

## 2023-01-26 RX ORDER — HEPARIN 100 UNIT/ML
500 SYRINGE INTRAVENOUS AS NEEDED
Start: 2023-02-21

## 2023-01-26 RX ORDER — EPINEPHRINE 1 MG/ML
0.3 INJECTION, SOLUTION, CONCENTRATE INTRAVENOUS AS NEEDED
OUTPATIENT
Start: 2023-02-21

## 2023-01-26 RX ORDER — DIPHENHYDRAMINE HYDROCHLORIDE 50 MG/ML
25 INJECTION, SOLUTION INTRAMUSCULAR; INTRAVENOUS AS NEEDED
Start: 2023-02-07

## 2023-01-26 RX ORDER — HYDROCORTISONE SODIUM SUCCINATE 100 MG/2ML
100 INJECTION, POWDER, FOR SOLUTION INTRAMUSCULAR; INTRAVENOUS AS NEEDED
OUTPATIENT
Start: 2023-02-14

## 2023-01-26 RX ORDER — SODIUM CHLORIDE 0.9 % (FLUSH) 0.9 %
5-40 SYRINGE (ML) INJECTION AS NEEDED
OUTPATIENT
Start: 2023-02-28

## 2023-01-26 RX ORDER — HEPARIN 100 UNIT/ML
500 SYRINGE INTRAVENOUS AS NEEDED
Start: 2023-02-07

## 2023-01-26 NOTE — TELEPHONE ENCOUNTER
Please call patient regarding her diet, the foods she eats with iron hurts her stomach. She woke up last night to go to the restroom she could not move, she is tired all day.

## 2023-01-26 NOTE — TELEPHONE ENCOUNTER
Return call placed to pt. HIPAA verified by two patient identifiers. Pt informed nurse she's no longer taking oral iron and that's she's trying to eat food that's rich in iron. Pt report the beef liver and lentils are hard to digest and hurt her stomach. Pt advised not to eat the foods that are causing problems. Pt informed we will order he IV iron and scheduling will call her to set up the infusion. Pt informed labs only needed if insurance requests. Pt verbalized understanding.

## 2023-01-27 ENCOUNTER — TRANSCRIBE ORDER (OUTPATIENT)
Dept: SCHEDULING | Age: 68
End: 2023-01-27

## 2023-01-27 DIAGNOSIS — M81.0 SENILE OSTEOPOROSIS: Primary | ICD-10-CM

## 2023-02-16 ENCOUNTER — HOSPITAL ENCOUNTER (OUTPATIENT)
Dept: INFUSION THERAPY | Age: 68
Discharge: HOME | End: 2023-02-16
Payer: MEDICARE

## 2023-02-16 VITALS
HEART RATE: 99 BPM | RESPIRATION RATE: 18 BRPM | DIASTOLIC BLOOD PRESSURE: 73 MMHG | WEIGHT: 183.9 LBS | OXYGEN SATURATION: 97 % | TEMPERATURE: 97.7 F | BODY MASS INDEX: 31.57 KG/M2 | SYSTOLIC BLOOD PRESSURE: 106 MMHG

## 2023-02-16 DIAGNOSIS — D50.9 IRON DEFICIENCY ANEMIA, UNSPECIFIED IRON DEFICIENCY ANEMIA TYPE: Primary | ICD-10-CM

## 2023-02-16 PROCEDURE — 96374 THER/PROPH/DIAG INJ IV PUSH: CPT

## 2023-02-16 PROCEDURE — 74011250636 HC RX REV CODE- 250/636: Performed by: NURSE PRACTITIONER

## 2023-02-16 RX ORDER — SODIUM CHLORIDE 0.9 % (FLUSH) 0.9 %
5-40 SYRINGE (ML) INJECTION AS NEEDED
Status: DISCONTINUED | OUTPATIENT
Start: 2023-02-16 | End: 2023-02-17 | Stop reason: HOSPADM

## 2023-02-16 RX ADMIN — IRON SUCROSE 200 MG: 20 INJECTION, SOLUTION INTRAVENOUS at 15:20

## 2023-02-16 NOTE — PROGRESS NOTES
[facility] Progress Note    Date: 2023    Name: Ankita Heard    MRN: [id number]         : 1955    Ms. Geovany Tanner Arrived ambulatory and in no distress for Venofer Dose 1 of 5. Assessment was completed. Pt reports increased fatigue, some JENNINGS, and intermittent nausea. 24 G PIV established to left arm without difficulty. Discussed Venofer purpose, administration, frequency, possible side effects and possibility of reaction; provided printed information. Ms. Magdaleno's vitals were reviewed. Patient Vitals for the past 12 hrs:   Temp Pulse Resp BP SpO2   23 1552 -- (P) 99 (P) 18 (P) 117/69 --   23 1506 97.7 °F (36.5 °C) 99 18 106/73 97 %     Medications:  Medications Administered       iron sucrose (VENOFER) injection 200 mg       Admin Date  2023 Action  Given Dose  200 mg Route  IntraVENous Administered By  José Miguel Louis RN                    Ms. Geovany Tanner tolerated treatment well and was discharged from [place] [address] in stable condition . PIV flushed & removed. Discharge instructions reviewed with patient, verbalized understanding. Patient observed for 30 minutes post infusion, no s/s of reaction. She is to return on 2023 for her next appointment.     Ivanna Pena RN  2023

## 2023-02-23 ENCOUNTER — HOSPITAL ENCOUNTER (OUTPATIENT)
Dept: INFUSION THERAPY | Age: 68
Discharge: HOME | End: 2023-02-23
Payer: MEDICARE

## 2023-02-23 VITALS
BODY MASS INDEX: 31.19 KG/M2 | HEART RATE: 92 BPM | SYSTOLIC BLOOD PRESSURE: 130 MMHG | DIASTOLIC BLOOD PRESSURE: 73 MMHG | OXYGEN SATURATION: 99 % | RESPIRATION RATE: 18 BRPM | TEMPERATURE: 98.1 F | WEIGHT: 181.7 LBS

## 2023-02-23 DIAGNOSIS — D50.9 IRON DEFICIENCY ANEMIA, UNSPECIFIED IRON DEFICIENCY ANEMIA TYPE: Primary | ICD-10-CM

## 2023-02-23 PROCEDURE — 74011250636 HC RX REV CODE- 250/636: Performed by: NURSE PRACTITIONER

## 2023-02-23 PROCEDURE — 96374 THER/PROPH/DIAG INJ IV PUSH: CPT

## 2023-02-23 PROCEDURE — 74011000250 HC RX REV CODE- 250: Performed by: STUDENT IN AN ORGANIZED HEALTH CARE EDUCATION/TRAINING PROGRAM

## 2023-02-23 RX ORDER — SODIUM CHLORIDE 0.9 % (FLUSH) 0.9 %
5-40 SYRINGE (ML) INJECTION AS NEEDED
Status: DISCONTINUED | OUTPATIENT
Start: 2023-02-23 | End: 2023-02-24 | Stop reason: HOSPADM

## 2023-02-23 RX ADMIN — IRON SUCROSE 200 MG: 20 INJECTION, SOLUTION INTRAVENOUS at 15:22

## 2023-02-23 RX ADMIN — SODIUM CHLORIDE, PRESERVATIVE FREE 10 ML: 5 INJECTION INTRAVENOUS at 15:21

## 2023-02-23 RX ADMIN — SODIUM CHLORIDE, PRESERVATIVE FREE 10 ML: 5 INJECTION INTRAVENOUS at 15:30

## 2023-02-24 ENCOUNTER — TELEPHONE (OUTPATIENT)
Dept: ONCOLOGY | Age: 68
End: 2023-02-24

## 2023-02-24 DIAGNOSIS — R11.0 NAUSEA: Primary | ICD-10-CM

## 2023-02-24 RX ORDER — ONDANSETRON 4 MG/1
4 TABLET, ORALLY DISINTEGRATING ORAL
Qty: 20 TABLET | Refills: 0 | Status: SHIPPED | OUTPATIENT
Start: 2023-02-24

## 2023-02-24 NOTE — TELEPHONE ENCOUNTER
Patient is calling about asking about some meds for nausea. Is getting iron infusions but is having some bouts of nausea and was told she could ask for some meds for this.

## 2023-02-24 NOTE — TELEPHONE ENCOUNTER
Return call placed to pt. HIPAA verified by two patient identifiers. Pt informed Zofran being sent to her pharmacy. Pt verbalized understanding.

## 2023-02-24 NOTE — PROGRESS NOTES
PER SAGRARIO FROM PHARMACIST JEANNE WILKINS TO OPAL BLAKE RN FOR THE BELOW REFILL REQUEST:    Requested Prescriptions     Pending Prescriptions Disp Refills    ondansetron (ZOFRAN ODT) 4 mg disintegrating tablet 20 Tablet 0     Sig: Take 1 Tablet by mouth every eight (8) hours as needed for Nausea or Vomiting. Pt informed medication being sent to her pharmacy. Pt verbalized understanding.

## 2023-03-02 ENCOUNTER — HOSPITAL ENCOUNTER (OUTPATIENT)
Dept: INFUSION THERAPY | Age: 68
Discharge: HOME | End: 2023-03-02
Payer: MEDICARE

## 2023-03-02 VITALS
TEMPERATURE: 97.4 F | HEART RATE: 93 BPM | DIASTOLIC BLOOD PRESSURE: 72 MMHG | SYSTOLIC BLOOD PRESSURE: 111 MMHG | OXYGEN SATURATION: 100 % | RESPIRATION RATE: 16 BRPM

## 2023-03-02 DIAGNOSIS — D50.9 IRON DEFICIENCY ANEMIA, UNSPECIFIED IRON DEFICIENCY ANEMIA TYPE: Primary | ICD-10-CM

## 2023-03-02 PROCEDURE — 74011250636 HC RX REV CODE- 250/636: Performed by: NURSE PRACTITIONER

## 2023-03-02 PROCEDURE — 96374 THER/PROPH/DIAG INJ IV PUSH: CPT

## 2023-03-02 RX ADMIN — IRON SUCROSE 200 MG: 20 INJECTION, SOLUTION INTRAVENOUS at 15:18

## 2023-03-02 NOTE — PROGRESS NOTES
Outpatient Infusion Center Short Visit Progress Note    1500 Patient admitted to [facility] for Venofer 3/5 ambulatory in stable condition. Assessment completed. No new concerns voiced. Vital Signs:  Patient Vitals for the past 12 hrs:   Temp Pulse Resp BP SpO2   03/02/23 1523 -- 93 16 111/72 --   03/02/23 1508 97.4 °F (36.3 °C) 93 18 113/71 100 %         Peripheral IV 03/02/23 Anterior; Left Forearm (Active)   Site Assessment Clean, dry, & intact 03/02/23 1508   Phlebitis Assessment 0 03/02/23 1508   Infiltration Assessment 0 03/02/23 1508   Dressing Status New 03/02/23 1508   Dressing Type Transparent 03/02/23 1508   Hub Color/Line Status Yellow; Flushed; Infusing 03/02/23 1508   Alcohol Cap Used Yes 03/02/23 1508     Medications:  Medications Administered       iron sucrose (VENOFER) injection 200 mg       Admin Date  03/02/2023 Action  Given Dose  200 mg Route  IntraVENous Administered By  DarHealogican [email or web address] A                      Patient declined to be monitored for 30 mins post infusion. No adverse reactions noted. Patient educated on Infusion reactions and verbalized understanding. DISCHARGE INSTRUCTIONS FOR:    IRON INFUSIONS - INCLUDING VENOFER, FERRLECIT, AND INFED    You should continue to take your usual home medications unless otherwise instructed by your physician. Drink plenty of fluids and eat your usual diet. All medications have the potential to cause side effects. Your physician can instruct you regarding any necessary treatment for side effects. Some possible side effects of iron infusions may include the following:     - Urinary changes;   - Mild muscle cramping;   - Mild nausea, stomach pain, diarrhea or constipation;   - Mild skin itching, mild pain at IV site. Signs/Symptoms of an allergic reaction may require immediate medical attention. These may include one or more of the following:       Skin redness, itching, swelling, blistering, weeping, crusting, rash or hives.   Wheezing, chest tightness, cough, or shortness of breath;   Swelling of the face, eyelids, lips, tongue, or throat;  Severe headache, seizures or tremor;  Stuffy nose, runny nose, sneezing;   Red (bloodshot), itchy, swollen, or watery eyes;  Stomach  pain, nausea, vomiting, diarrhea or bloody diarrhea; Chest pain or tightness, increased heart rate, palpitations, changes in blood pressure which can cause dizziness, unusual feelings of weakness or fatigue;  Back ache or pain around waist;  Painful urination, increase or decrease in amount of urine, blood in urine. Contact your physicians office with any questions or concerns regarding your treatment. Patient PIV flushed and removed, bandage placed over site. Patient tolerated treatment well. Patient discharged from [place] [address] ambulatory in no distress at 1530. Patient aware of next appointment.     Future Appointments   Date Time Provider Gabriel Allie   3/9/2023  3:00 PM JONA CRISTINA 4 [facility]   3/16/2023  3:00 PM JONA CRISTINA 5 [facility]   4/3/2023 12:30 PM [facility] VANNESA 1 [facility]   4/3/2023  1:00 PM [facility] DEXA 1 [facility]   6/28/2023 10:30 AM Rossana Blandon MD ONCMR BS AMB

## 2023-03-09 ENCOUNTER — HOSPITAL ENCOUNTER (OUTPATIENT)
Dept: INFUSION THERAPY | Age: 68
Discharge: HOME | End: 2023-03-09
Payer: MEDICARE

## 2023-03-09 VITALS
SYSTOLIC BLOOD PRESSURE: 126 MMHG | HEART RATE: 90 BPM | OXYGEN SATURATION: 99 % | DIASTOLIC BLOOD PRESSURE: 74 MMHG | RESPIRATION RATE: 18 BRPM | TEMPERATURE: 97.8 F

## 2023-03-09 DIAGNOSIS — D50.9 IRON DEFICIENCY ANEMIA, UNSPECIFIED IRON DEFICIENCY ANEMIA TYPE: Primary | ICD-10-CM

## 2023-03-09 PROCEDURE — 96374 THER/PROPH/DIAG INJ IV PUSH: CPT

## 2023-03-09 PROCEDURE — 74011250636 HC RX REV CODE- 250/636: Performed by: NURSE PRACTITIONER

## 2023-03-09 PROCEDURE — 74011000250 HC RX REV CODE- 250: Performed by: STUDENT IN AN ORGANIZED HEALTH CARE EDUCATION/TRAINING PROGRAM

## 2023-03-09 RX ORDER — SODIUM CHLORIDE 0.9 % (FLUSH) 0.9 %
5-40 SYRINGE (ML) INJECTION AS NEEDED
Status: DISCONTINUED | OUTPATIENT
Start: 2023-03-09 | End: 2023-03-10 | Stop reason: HOSPADM

## 2023-03-09 RX ADMIN — IRON SUCROSE 200 MG: 20 INJECTION, SOLUTION INTRAVENOUS at 15:14

## 2023-03-09 RX ADMIN — SODIUM CHLORIDE, PRESERVATIVE FREE 20 ML: 5 INJECTION INTRAVENOUS at 15:20

## 2023-03-09 NOTE — PROGRESS NOTES
Outpatient Infusion Center - Chemotherapy Progress Note    Pt admit to [facility] for Venofer 4/5 in stable condition. Assessment completed. #24 PIV established R AC with positive blood return. Pt states she is feeling more energetic and was able to fix a big meal the last 2 days. Patient Vitals for the past 12 hrs:   Temp Pulse Resp BP SpO2   03/09/23 1500 97.8 °F (36.6 °C) 90 18 126/74 99 %        Medications:  Medications Administered       iron sucrose (VENOFER) injection 200 mg       Admin Date  03/09/2023 Action  Given Dose  200 mg Route  IntraVENous Administered By  Connie Common              sodium chloride (NS) flush 5-40 mL       Admin Date  03/09/2023 Action  Given Dose  20 mL Route  IntraVENous Administered By  Connie Common             Pt tolerated treatment well. Opted not to stay for post-iron monitoring. IV flushed and dc'd at discharge. Pt aware of next [facility] appointment scheduled for: 3/16/23 at 1500.      Future Appointments   Date Time Provider Gabriel Kelley   3/16/2023  3:00 PM JONA CRISTINA [address] [place] [address] REG   4/3/2023 12:30 PM [facility] VANNESA 1 [facility] REG   4/3/2023  1:00 PM [facility] DEXA 1 [facility] REG   6/28/2023 10:30 AM Carrington Puga MD ONCMR BS AMB

## 2023-03-16 ENCOUNTER — HOSPITAL ENCOUNTER (OUTPATIENT)
Dept: INFUSION THERAPY | Age: 68
Discharge: HOME | End: 2023-03-16
Payer: MEDICARE

## 2023-03-16 VITALS
TEMPERATURE: 97.7 F | SYSTOLIC BLOOD PRESSURE: 130 MMHG | DIASTOLIC BLOOD PRESSURE: 83 MMHG | OXYGEN SATURATION: 98 % | HEART RATE: 97 BPM | RESPIRATION RATE: 16 BRPM

## 2023-03-16 DIAGNOSIS — D50.9 IRON DEFICIENCY ANEMIA, UNSPECIFIED IRON DEFICIENCY ANEMIA TYPE: Primary | ICD-10-CM

## 2023-03-16 PROCEDURE — 74011250636 HC RX REV CODE- 250/636: Performed by: NURSE PRACTITIONER

## 2023-03-16 PROCEDURE — 96374 THER/PROPH/DIAG INJ IV PUSH: CPT

## 2023-03-16 PROCEDURE — 74011000250 HC RX REV CODE- 250: Performed by: STUDENT IN AN ORGANIZED HEALTH CARE EDUCATION/TRAINING PROGRAM

## 2023-03-16 RX ORDER — SODIUM CHLORIDE 0.9 % (FLUSH) 0.9 %
5-40 SYRINGE (ML) INJECTION AS NEEDED
Status: DISPENSED | OUTPATIENT
Start: 2023-03-16 | End: 2023-03-16

## 2023-03-16 RX ADMIN — SODIUM CHLORIDE, PRESERVATIVE FREE 10 ML: 5 INJECTION INTRAVENOUS at 15:00

## 2023-03-16 RX ADMIN — SODIUM CHLORIDE, PRESERVATIVE FREE 10 ML: 5 INJECTION INTRAVENOUS at 15:11

## 2023-03-16 RX ADMIN — IRON SUCROSE 200 MG: 20 INJECTION, SOLUTION INTRAVENOUS at 15:04

## 2023-03-16 NOTE — PROGRESS NOTES
Outpatient Infusion Center Progress Note    1500  Pt arrived in stable condition for Venofer Dose 5    Assessment unchanged. 24G PIV established in left a/c with positive blood return noted. Patient Vitals for the past 12 hrs:   Temp Pulse Resp BP SpO2   03/16/23 1511 -- 97 16 130/83 --   03/16/23 1459 97.7 °F (36.5 °C) 98 16 131/80 98 %      The following medications administered:  Medications Administered       iron sucrose (VENOFER) injection 200 mg       Admin Date  03/16/2023 Action  Given Dose  200 mg Route  IntraVENous Administered By  Sebas Rodriguez, RN              sodium chloride (NS) flush 5-40 mL       Admin Date  03/16/2023 Action  Given Dose  10 mL Route  IntraVENous Administered By  Sebas Rodriguez, GERSON               Admin Date  03/16/2023 Action  Given Dose  10 mL Route  IntraVENous Administered By  Sebas Rodriguez, RN                    Pt politely declined to stay for observation. Pt tolerated treatment well. IV flushed per policy and removed, 2x2 and coban placed. Pt provided with education on possible side effects of medication along with discharge instructions. Pt verbalized understanding. 1515  Pt discharged in no acute distress.  Next appointment:    Future Appointments   Date Time Provider Gabriel Kelley   4/3/2023 12:30 PM [facility] 1 [facility] REG   4/3/2023  1:00 PM [address] 1 [facility] REG   6/28/2023 10:30 AM Vicenta Rebolledo MD ONCMR BS AMB

## 2023-04-03 ENCOUNTER — HOSPITAL ENCOUNTER (OUTPATIENT)
Dept: MAMMOGRAPHY | Age: 68
End: 2023-04-03
Attending: NURSE PRACTITIONER
Payer: MEDICARE

## 2023-04-03 DIAGNOSIS — Z12.31 SCREENING MAMMOGRAM FOR HIGH-RISK PATIENT: ICD-10-CM

## 2023-04-03 DIAGNOSIS — M81.0 SENILE OSTEOPOROSIS: ICD-10-CM

## 2023-04-03 PROCEDURE — 77067 SCR MAMMO BI INCL CAD: CPT

## 2023-04-03 PROCEDURE — 77080 DXA BONE DENSITY AXIAL: CPT

## 2023-04-21 DIAGNOSIS — Z78.0 MENOPAUSE: Primary | ICD-10-CM

## 2023-04-21 DIAGNOSIS — Z12.31 SCREENING MAMMOGRAM FOR HIGH-RISK PATIENT: Primary | ICD-10-CM

## 2023-04-22 DIAGNOSIS — D53.9 MACROCYTIC ANEMIA: Primary | ICD-10-CM

## 2023-04-22 DIAGNOSIS — M81.0 SENILE OSTEOPOROSIS: Primary | ICD-10-CM

## 2023-04-22 DIAGNOSIS — Z78.0 MENOPAUSE: Primary | ICD-10-CM

## 2023-04-23 DIAGNOSIS — D53.9 MACROCYTIC ANEMIA: Primary | ICD-10-CM

## 2023-04-24 DIAGNOSIS — D53.9 MACROCYTIC ANEMIA: Primary | ICD-10-CM

## 2023-06-28 DIAGNOSIS — D53.9 MACROCYTIC ANEMIA: ICD-10-CM

## 2023-06-28 LAB
ALBUMIN SERPL-MCNC: 4 G/DL (ref 3.5–5)
ALBUMIN/GLOB SERPL: 1.2 (ref 1.1–2.2)
ALP SERPL-CCNC: 111 U/L (ref 45–117)
ALT SERPL-CCNC: 17 U/L (ref 12–78)
ANION GAP SERPL CALC-SCNC: 3 MMOL/L (ref 5–15)
AST SERPL-CCNC: 13 U/L (ref 15–37)
BASOPHILS # BLD: 0.1 K/UL (ref 0–0.1)
BASOPHILS NFR BLD: 1 % (ref 0–1)
BILIRUB SERPL-MCNC: 0.3 MG/DL (ref 0.2–1)
BUN SERPL-MCNC: 8 MG/DL (ref 6–20)
BUN/CREAT SERPL: 9 (ref 12–20)
CALCIUM SERPL-MCNC: 9.5 MG/DL (ref 8.5–10.1)
CHLORIDE SERPL-SCNC: 114 MMOL/L (ref 97–108)
CO2 SERPL-SCNC: 27 MMOL/L (ref 21–32)
CREAT SERPL-MCNC: 0.86 MG/DL (ref 0.55–1.02)
DIFFERENTIAL METHOD BLD: ABNORMAL
EOSINOPHIL # BLD: 0.3 K/UL (ref 0–0.4)
EOSINOPHIL NFR BLD: 4 % (ref 0–7)
ERYTHROCYTE [DISTWIDTH] IN BLOOD BY AUTOMATED COUNT: 13.2 % (ref 11.5–14.5)
FERRITIN SERPL-MCNC: 334 NG/ML (ref 26–388)
FOLATE SERPL-MCNC: 28.2 NG/ML (ref 5–21)
GLOBULIN SER CALC-MCNC: 3.3 G/DL (ref 2–4)
GLUCOSE SERPL-MCNC: 105 MG/DL (ref 65–100)
HCT VFR BLD AUTO: 35.6 % (ref 35–47)
HGB BLD-MCNC: 11.7 G/DL (ref 11.5–16)
IMM GRANULOCYTES # BLD AUTO: 0 K/UL (ref 0–0.04)
IMM GRANULOCYTES NFR BLD AUTO: 0 % (ref 0–0.5)
IRON SATN MFR SERPL: 28 % (ref 20–50)
IRON SERPL-MCNC: 65 UG/DL (ref 35–150)
LYMPHOCYTES # BLD: 2.9 K/UL (ref 0.8–3.5)
LYMPHOCYTES NFR BLD: 36 % (ref 12–49)
MCH RBC QN AUTO: 31.7 PG (ref 26–34)
MCHC RBC AUTO-ENTMCNC: 32.9 G/DL (ref 30–36.5)
MCV RBC AUTO: 96.5 FL (ref 80–99)
MONOCYTES # BLD: 0.6 K/UL (ref 0–1)
MONOCYTES NFR BLD: 7 % (ref 5–13)
NEUTS SEG # BLD: 4.3 K/UL (ref 1.8–8)
NEUTS SEG NFR BLD: 52 % (ref 32–75)
NRBC # BLD: 0 K/UL (ref 0–0.01)
NRBC BLD-RTO: 0 PER 100 WBC
PLATELET # BLD AUTO: 337 K/UL (ref 150–400)
PMV BLD AUTO: 10.4 FL (ref 8.9–12.9)
POTASSIUM SERPL-SCNC: 4.2 MMOL/L (ref 3.5–5.1)
PROT SERPL-MCNC: 7.3 G/DL (ref 6.4–8.2)
RBC # BLD AUTO: 3.69 M/UL (ref 3.8–5.2)
SODIUM SERPL-SCNC: 144 MMOL/L (ref 136–145)
TIBC SERPL-MCNC: 234 UG/DL (ref 250–450)
VIT B12 SERPL-MCNC: 432 PG/ML (ref 193–986)
WBC # BLD AUTO: 8.2 K/UL (ref 3.6–11)

## 2023-07-13 ENCOUNTER — OFFICE VISIT (OUTPATIENT)
Age: 68
End: 2023-07-13
Payer: MEDICARE

## 2023-07-13 VITALS
HEART RATE: 87 BPM | RESPIRATION RATE: 18 BRPM | TEMPERATURE: 98.2 F | WEIGHT: 164 LBS | BODY MASS INDEX: 28.15 KG/M2 | SYSTOLIC BLOOD PRESSURE: 121 MMHG | DIASTOLIC BLOOD PRESSURE: 79 MMHG | OXYGEN SATURATION: 96 %

## 2023-07-13 DIAGNOSIS — D50.9 IRON DEFICIENCY ANEMIA, UNSPECIFIED IRON DEFICIENCY ANEMIA TYPE: Primary | ICD-10-CM

## 2023-07-13 PROCEDURE — 99214 OFFICE O/P EST MOD 30 MIN: CPT | Performed by: STUDENT IN AN ORGANIZED HEALTH CARE EDUCATION/TRAINING PROGRAM

## 2023-07-13 PROCEDURE — G8399 PT W/DXA RESULTS DOCUMENT: HCPCS | Performed by: STUDENT IN AN ORGANIZED HEALTH CARE EDUCATION/TRAINING PROGRAM

## 2023-07-13 PROCEDURE — 3074F SYST BP LT 130 MM HG: CPT | Performed by: STUDENT IN AN ORGANIZED HEALTH CARE EDUCATION/TRAINING PROGRAM

## 2023-07-13 PROCEDURE — 1123F ACP DISCUSS/DSCN MKR DOCD: CPT | Performed by: STUDENT IN AN ORGANIZED HEALTH CARE EDUCATION/TRAINING PROGRAM

## 2023-07-13 PROCEDURE — G8419 CALC BMI OUT NRM PARAM NOF/U: HCPCS | Performed by: STUDENT IN AN ORGANIZED HEALTH CARE EDUCATION/TRAINING PROGRAM

## 2023-07-13 PROCEDURE — 3017F COLORECTAL CA SCREEN DOC REV: CPT | Performed by: STUDENT IN AN ORGANIZED HEALTH CARE EDUCATION/TRAINING PROGRAM

## 2023-07-13 PROCEDURE — 1090F PRES/ABSN URINE INCON ASSESS: CPT | Performed by: STUDENT IN AN ORGANIZED HEALTH CARE EDUCATION/TRAINING PROGRAM

## 2023-07-13 PROCEDURE — G8427 DOCREV CUR MEDS BY ELIG CLIN: HCPCS | Performed by: STUDENT IN AN ORGANIZED HEALTH CARE EDUCATION/TRAINING PROGRAM

## 2023-07-13 PROCEDURE — 3078F DIAST BP <80 MM HG: CPT | Performed by: STUDENT IN AN ORGANIZED HEALTH CARE EDUCATION/TRAINING PROGRAM

## 2023-07-13 PROCEDURE — 4004F PT TOBACCO SCREEN RCVD TLK: CPT | Performed by: STUDENT IN AN ORGANIZED HEALTH CARE EDUCATION/TRAINING PROGRAM

## 2023-07-13 RX ORDER — LEVOTHYROXINE SODIUM 0.07 MG/1
TABLET ORAL
COMMUNITY
Start: 2023-06-13

## 2023-07-13 RX ORDER — DULAGLUTIDE 3 MG/.5ML
INJECTION, SOLUTION SUBCUTANEOUS
COMMUNITY
Start: 2023-06-16

## 2023-07-13 RX ORDER — ALENDRONATE SODIUM 70 MG/1
TABLET ORAL
COMMUNITY
Start: 2023-06-28

## 2023-07-13 NOTE — PROGRESS NOTES
Sindy Stock is a 77 y.o. female who presents for follow up of   Chief Complaint   Patient presents with    Follow-up     Anemia       The patient reports no new clinical symptoms or new complaints since last clinic evaluation. Pt reports she is doing well. Pt states she is doing so  much better. Labs completed. No interval hospitalizations reported    No interval surgery or procedures reported    No reported new medication changes reported       Medications reviewed with the patient, and chart updated to reflect changes.
obtained, negative except as described above. 12/27/21:  Patient got Injectafer on 11/2/21 and 11/9/21 with good toleration. Reports marked improvement in prior fatigue. No new bleeding or melena. Patient in good spirits, feels well. 6/28/22:  Patient doing well, she reports that she still has some fatigue but thinks that some of her symptoms might be a consequence of thyroid dysfunction. Overall no ice cravings or any other constitutional symptoms that are concerning for persistent  iron deficiency. Labs reviewed with the patient today. Interval History:   7/13/23      She is doing well. No new clinical worsening. Labs reviewed and are stable. No new clinical complaints.            Past Medical History:   Diagnosis Date    Arthritis     CAD (coronary artery disease)     6 stents (RENEE) 3/21/18 at [facility]    Diabetes ([address])     Dizziness     years ago 9/21    Dyspepsia and other specified disorders of function of stomach     GERD (gastroesophageal reflux disease)     Hx of cardiac cath 08/2018    after abnormal stress test 3/13/18    Hypertension complicating diabetes ([address])     Loss of appetite     Musculoskeletal disorder     back pain    Other ill-defined conditions(799.89)     shingles    Thyroid disease     hypothyroidism       Past Surgical History:   Procedure Laterality Date    COLONOSCOPY N/A 3/30/2022    COLONOSCOPY performed by Murriel Habermann, MD at [address]  1/9/12    [address] [facility]    hysterectomy & 2 c-sections    HEENT  1963    tonsils    ORTHOPEDIC SURGERY      neck surgery fusion and lower back fusion    ORTHOPEDIC SURGERY Bilateral     carpal tunnel release    OTHER SURGICAL HISTORY  2015    abscess removed from right back    OTHER SURGICAL HISTORY  1976    sweat glands removed @ [facility]    [place] UNLISTED PROCEDURE CARDIAC SURGERY  2018    6 stents        Social History     Socioeconomic History    Marital status:    Tobacco Use    Smoking

## 2024-01-10 DIAGNOSIS — D50.9 IRON DEFICIENCY ANEMIA, UNSPECIFIED IRON DEFICIENCY ANEMIA TYPE: ICD-10-CM

## 2024-01-10 LAB
BASOPHILS # BLD: 0 K/UL (ref 0–0.1)
BASOPHILS NFR BLD: 0 % (ref 0–1)
DIFFERENTIAL METHOD BLD: ABNORMAL
EOSINOPHIL # BLD: 0.4 K/UL (ref 0–0.4)
EOSINOPHIL NFR BLD: 4 % (ref 0–7)
ERYTHROCYTE [DISTWIDTH] IN BLOOD BY AUTOMATED COUNT: 14.7 % (ref 11.5–14.5)
FERRITIN SERPL-MCNC: 161 NG/ML (ref 8–252)
HCT VFR BLD AUTO: 31.5 % (ref 35–47)
HGB BLD-MCNC: 10.5 G/DL (ref 11.5–16)
IMM GRANULOCYTES # BLD AUTO: 0 K/UL (ref 0–0.04)
IMM GRANULOCYTES NFR BLD AUTO: 0 % (ref 0–0.5)
IRON SATN MFR SERPL: 15 % (ref 20–50)
IRON SERPL-MCNC: 38 UG/DL (ref 35–150)
LYMPHOCYTES # BLD: 3.4 K/UL (ref 0.8–3.5)
LYMPHOCYTES NFR BLD: 35 % (ref 12–49)
MCH RBC QN AUTO: 32.1 PG (ref 26–34)
MCHC RBC AUTO-ENTMCNC: 33.3 G/DL (ref 30–36.5)
MCV RBC AUTO: 96.3 FL (ref 80–99)
MONOCYTES # BLD: 0.7 K/UL (ref 0–1)
MONOCYTES NFR BLD: 8 % (ref 5–13)
NEUTS SEG # BLD: 5.1 K/UL (ref 1.8–8)
NEUTS SEG NFR BLD: 53 % (ref 32–75)
NRBC # BLD: 0 K/UL (ref 0–0.01)
NRBC BLD-RTO: 0 PER 100 WBC
PLATELET # BLD AUTO: 375 K/UL (ref 150–400)
PMV BLD AUTO: 9.7 FL (ref 8.9–12.9)
RBC # BLD AUTO: 3.27 M/UL (ref 3.8–5.2)
TIBC SERPL-MCNC: 257 UG/DL (ref 250–450)
WBC # BLD AUTO: 9.7 K/UL (ref 3.6–11)

## 2024-01-17 ENCOUNTER — OFFICE VISIT (OUTPATIENT)
Age: 69
End: 2024-01-17
Payer: MEDICARE

## 2024-01-17 VITALS
SYSTOLIC BLOOD PRESSURE: 90 MMHG | RESPIRATION RATE: 17 BRPM | TEMPERATURE: 97.9 F | DIASTOLIC BLOOD PRESSURE: 60 MMHG | HEART RATE: 88 BPM | HEIGHT: 65 IN | WEIGHT: 144.4 LBS | OXYGEN SATURATION: 97 % | BODY MASS INDEX: 24.06 KG/M2

## 2024-01-17 DIAGNOSIS — D50.9 IRON DEFICIENCY ANEMIA, UNSPECIFIED IRON DEFICIENCY ANEMIA TYPE: Primary | ICD-10-CM

## 2024-01-17 PROCEDURE — 99213 OFFICE O/P EST LOW 20 MIN: CPT | Performed by: STUDENT IN AN ORGANIZED HEALTH CARE EDUCATION/TRAINING PROGRAM

## 2024-01-17 PROCEDURE — 3078F DIAST BP <80 MM HG: CPT | Performed by: STUDENT IN AN ORGANIZED HEALTH CARE EDUCATION/TRAINING PROGRAM

## 2024-01-17 PROCEDURE — 1123F ACP DISCUSS/DSCN MKR DOCD: CPT | Performed by: STUDENT IN AN ORGANIZED HEALTH CARE EDUCATION/TRAINING PROGRAM

## 2024-01-17 PROCEDURE — 3074F SYST BP LT 130 MM HG: CPT | Performed by: STUDENT IN AN ORGANIZED HEALTH CARE EDUCATION/TRAINING PROGRAM

## 2024-01-17 NOTE — PROGRESS NOTES
Sahnnan Paredes is a 68 y.o. female today for anemia f/u. Pt denies fatigue; pt report she feeling great, more energy, she does a lot more than she use to after getting the iron infusions. No cravings. No active bleeding. Pt report she have lost weight. Pt weighed 164 lbs. six months ago; today pt weighs 144.4 lbs.      Chief Complaint   Patient presents with    Follow-up     Anemia         1. Have you been to the ER, urgent care clinic since your last visit?  Hospitalized since your last visit?No    2. Have you seen or consulted any other health care providers outside of the [facility] since your last visit?  Include any pap smears or colon screening. Yes; PCP; Cardiologist and Podiatrist    
medications for this visit.       Allergies   Allergen Reactions    Aspirin Nausea Only     Other reaction(s): Nausea Only    Penicillins Rash               Physical Exam:          Vitals:    01/17/24 0938   BP: 90/60   Pulse: 88   Resp: 17   Temp: 97.9 °F (36.6 °C)   SpO2: 97%     General: alert, cooperative, no distress   Mental  status: normal mood, behavior, speech, dress, motor activity, and thought processes, able to follow commands   HENT: NCAT   Neck: no visualized mass   Resp: no respiratory distress   Neuro: no gross deficits   Skin: no discoloration or lesions of concern on visible areas   Psychiatric: normal affect, consistent with stated mood, no evidence of hallucinations               Results:          Lab Results   Component Value Date    WBC 9.7 01/10/2024    HGB 10.5 (L) 01/10/2024    HCT 31.5 (L) 01/10/2024    MCV 96.3 01/10/2024     01/10/2024     Lab Results   Component Value Date     06/28/2023    K 4.2 06/28/2023     (H) 06/28/2023    CO2 27 06/28/2023    BUN 8 06/28/2023    CREATININE 0.86 06/28/2023    GLUCOSE 105 (H) 06/28/2023    CALCIUM 9.5 06/28/2023    PROT 7.3 06/28/2023    LABALBU 4.0 06/28/2023    BILITOT 0.3 06/28/2023    ALKPHOS 111 06/28/2023    AST 13 (L) 06/28/2023    ALT 17 06/28/2023    LABGLOM >60 06/28/2023    GFRAA >60 06/23/2022    AGRATIO 1.2 12/19/2022    GLOB 3.3 06/28/2023       Lab Results   Component Value Date    IRON 38 01/10/2024    TIBC 257 01/10/2024    FERRITIN 161 01/10/2024                   Assessment and Recommendations:         # Normocytic Anemia, due to iron deficiency anemia      - Repeat CBC and iron profile today shows no evidence of severe iron deficiency.  Hemoglobin is near normal at 11.1.   -Colonoscopy in March showed only internal hemorrhoids   - received IV iron with Injectafer on 11/2/21 and 11/9/21   - I educated the patient on the importance of increasing dietary intake of iron-containing foods.      -Hold off on

## 2024-01-24 ENCOUNTER — TELEPHONE (OUTPATIENT)
Age: 69
End: 2024-01-24

## 2024-01-25 NOTE — TELEPHONE ENCOUNTER
Return call placed to pt. HIPAA verified by two patient identifiers.     NP reviewed labs. No IV iron needed.     Pt made aware she does not need IV iron at this time and she need to f/u w/ provider in six months and also have labs done prior to appt. Pt confirm receiving lab slips.     When pt was informed by nurse she does not need IV iron at this time pt stated \"I'm not understanding, so I'm going to hang up now.\" Pt hung up.

## 2024-07-10 DIAGNOSIS — D50.9 IRON DEFICIENCY ANEMIA, UNSPECIFIED IRON DEFICIENCY ANEMIA TYPE: ICD-10-CM

## 2024-07-10 LAB
ALBUMIN SERPL-MCNC: 4 G/DL (ref 3.5–5)
ALBUMIN/GLOB SERPL: 1.1 (ref 1.1–2.2)
ALP SERPL-CCNC: 85 U/L (ref 45–117)
ALT SERPL-CCNC: 16 U/L (ref 12–78)
ANION GAP SERPL CALC-SCNC: 2 MMOL/L (ref 5–15)
AST SERPL-CCNC: 10 U/L (ref 15–37)
BASOPHILS # BLD: 0.1 K/UL (ref 0–0.1)
BASOPHILS NFR BLD: 1 % (ref 0–1)
BILIRUB SERPL-MCNC: 0.5 MG/DL (ref 0.2–1)
BUN SERPL-MCNC: 19 MG/DL (ref 6–20)
BUN/CREAT SERPL: 17 (ref 12–20)
CALCIUM SERPL-MCNC: 9.8 MG/DL (ref 8.5–10.1)
CHLORIDE SERPL-SCNC: 111 MMOL/L (ref 97–108)
CO2 SERPL-SCNC: 27 MMOL/L (ref 21–32)
CREAT SERPL-MCNC: 1.13 MG/DL (ref 0.55–1.02)
DIFFERENTIAL METHOD BLD: ABNORMAL
EOSINOPHIL # BLD: 0.2 K/UL (ref 0–0.4)
EOSINOPHIL NFR BLD: 2 % (ref 0–7)
ERYTHROCYTE [DISTWIDTH] IN BLOOD BY AUTOMATED COUNT: 12.9 % (ref 11.5–14.5)
FERRITIN SERPL-MCNC: 198 NG/ML (ref 26–388)
GLOBULIN SER CALC-MCNC: 3.8 G/DL (ref 2–4)
GLUCOSE SERPL-MCNC: 132 MG/DL (ref 65–100)
HCT VFR BLD AUTO: 34 % (ref 35–47)
HGB BLD-MCNC: 11.1 G/DL (ref 11.5–16)
IMM GRANULOCYTES # BLD AUTO: 0 K/UL (ref 0–0.04)
IMM GRANULOCYTES NFR BLD AUTO: 0 % (ref 0–0.5)
IRON SATN MFR SERPL: 21 % (ref 20–50)
IRON SERPL-MCNC: 56 UG/DL (ref 35–150)
LYMPHOCYTES # BLD: 3.2 K/UL (ref 0.8–3.5)
LYMPHOCYTES NFR BLD: 40 % (ref 12–49)
MCH RBC QN AUTO: 30.4 PG (ref 26–34)
MCHC RBC AUTO-ENTMCNC: 32.6 G/DL (ref 30–36.5)
MCV RBC AUTO: 93.2 FL (ref 80–99)
MONOCYTES # BLD: 0.6 K/UL (ref 0–1)
MONOCYTES NFR BLD: 8 % (ref 5–13)
NEUTS SEG # BLD: 4.1 K/UL (ref 1.8–8)
NEUTS SEG NFR BLD: 49 % (ref 32–75)
NRBC # BLD: 0 K/UL (ref 0–0.01)
NRBC BLD-RTO: 0 PER 100 WBC
PLATELET # BLD AUTO: 320 K/UL (ref 150–400)
PMV BLD AUTO: 9.3 FL (ref 8.9–12.9)
POTASSIUM SERPL-SCNC: 4.1 MMOL/L (ref 3.5–5.1)
PROT SERPL-MCNC: 7.8 G/DL (ref 6.4–8.2)
RBC # BLD AUTO: 3.65 M/UL (ref 3.8–5.2)
SODIUM SERPL-SCNC: 140 MMOL/L (ref 136–145)
TIBC SERPL-MCNC: 261 UG/DL (ref 250–450)
WBC # BLD AUTO: 8.1 K/UL (ref 3.6–11)

## 2024-07-17 ENCOUNTER — OFFICE VISIT (OUTPATIENT)
Age: 69
End: 2024-07-17
Payer: MEDICARE

## 2024-07-17 VITALS
OXYGEN SATURATION: 98 % | HEIGHT: 65 IN | SYSTOLIC BLOOD PRESSURE: 106 MMHG | DIASTOLIC BLOOD PRESSURE: 66 MMHG | WEIGHT: 158 LBS | BODY MASS INDEX: 26.33 KG/M2 | HEART RATE: 89 BPM | TEMPERATURE: 97.7 F

## 2024-07-17 DIAGNOSIS — R53.83 FATIGUE, UNSPECIFIED TYPE: ICD-10-CM

## 2024-07-17 DIAGNOSIS — D50.9 IRON DEFICIENCY ANEMIA, UNSPECIFIED IRON DEFICIENCY ANEMIA TYPE: Primary | ICD-10-CM

## 2024-07-17 PROCEDURE — 1123F ACP DISCUSS/DSCN MKR DOCD: CPT | Performed by: NURSE PRACTITIONER

## 2024-07-17 PROCEDURE — 3078F DIAST BP <80 MM HG: CPT | Performed by: NURSE PRACTITIONER

## 2024-07-17 PROCEDURE — 3074F SYST BP LT 130 MM HG: CPT | Performed by: NURSE PRACTITIONER

## 2024-07-17 PROCEDURE — 99213 OFFICE O/P EST LOW 20 MIN: CPT | Performed by: NURSE PRACTITIONER

## 2024-07-17 NOTE — PROGRESS NOTES
Shannan Paredes is a 69 y.o. female here for follow up for normocytic anemia.  Pt doing well. No concerns brought up.     1. Have you been to the ER, urgent care clinic since your last visit?  Hospitalized since your last visit? no    2. Have you seen or consulted any other health care providers outside of the [facility] since your last visit?  Include any pap smears or colon screening. PCP, Podiatrist, cardiologist  
ABSCESS SIMPLE  10    GYN  ,  &     hysterectomy & 2 c-sections    HEENT  1963    tonsils    ORTHOPEDIC SURGERY      neck surgery fusion and lower back fusion    ORTHOPEDIC SURGERY Bilateral     carpal tunnel release    OTHER SURGICAL HISTORY      abscess removed from right back    OTHER SURGICAL HISTORY      sweat glands removed @ [facility]    LA UNLISTED PROCEDURE CARDIAC SURGERY  2018    6 stents      Social History     Socioeconomic History    Marital status:    Tobacco Use    Smoking status: Former     Current packs/day: 0.00     Types: Cigarettes     Quit date: 1999     Years since quittin.5    Smokeless tobacco: Never   Vaping Use    Vaping Use: Never used   Substance and Sexual Activity    Alcohol use: Yes    Drug use: No   Social History Narrative    Retired but does part time with [employer].     Current Outpatient Medications   Medication Sig Dispense Refill    alendronate (FOSAMAX) 70 MG tablet       TRULICITY 3 MG/0.5ML SOPN       diclofenac sodium (VOLTAREN) 1 % GEL       levothyroxine (SYNTHROID) 75 MCG tablet       acetaminophen (TYLENOL) 650 MG extended release tablet Take 1 tablet by mouth every 8 hours as needed      aspirin 81 MG EC tablet Take 1 tablet by mouth daily      carvedilol (COREG) 3.125 MG tablet Take 4 tablets by mouth 2 times daily (with meals)      Cholecalciferol 50 MCG (2000 UT) CAPS Take by mouth every 7 days      famotidine (PEPCID) 40 MG tablet Take 1 tablet by mouth daily      insulin 70-30 (HUMULIN;NOVOLIN) (70-30) 100 UNIT per ML injection vial Inject into the skin (Patient not taking: Reported on 2024)      levothyroxine (SYNTHROID) 112 MCG tablet Take in am 5 days a week (Patient not taking: Reported on 2023)      metFORMIN (GLUCOPHAGE) 500 MG tablet Take 1 tablet by mouth daily (with breakfast)      ondansetron (ZOFRAN-ODT) 4 MG disintegrating tablet Take 1 tablet by mouth every 8 hours as needed      rosuvastatin

## 2024-08-27 ENCOUNTER — HOSPITAL ENCOUNTER (EMERGENCY)
Facility: HOSPITAL | Age: 69
Discharge: HOME | End: 2024-08-27
Payer: MEDICARE

## 2024-08-27 VITALS
DIASTOLIC BLOOD PRESSURE: 70 MMHG | HEART RATE: 81 BPM | WEIGHT: 158 LBS | BODY MASS INDEX: 26.98 KG/M2 | HEIGHT: 64 IN | TEMPERATURE: 97.7 F | RESPIRATION RATE: 17 BRPM | SYSTOLIC BLOOD PRESSURE: 114 MMHG | OXYGEN SATURATION: 99 %
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
## 2024-08-27 DIAGNOSIS — M79.604 RIGHT LEG PAIN: ICD-10-CM

## 2024-08-27 PROCEDURE — 99283 EMERGENCY DEPT VISIT LOW MDM: CPT

## 2024-08-27 ASSESSMENT — PAIN SCALES - GENERAL: PAINLEVEL_OUTOF10: 3

## 2024-08-27 ASSESSMENT — LIFESTYLE VARIABLES
HOW MANY STANDARD DRINKS CONTAINING ALCOHOL DO YOU HAVE ON A TYPICAL DAY: PATIENT DOES NOT DRINK
HOW OFTEN DO YOU HAVE A DRINK CONTAINING ALCOHOL: NEVER

## 2024-08-27 ASSESSMENT — PAIN DESCRIPTION - ORIENTATION: ORIENTATION: RIGHT

## 2024-08-27 ASSESSMENT — PAIN DESCRIPTION - LOCATION: LOCATION: LEG

## 2024-08-27 NOTE — ED PROVIDER NOTES
up as recommended or return to ER should their symptoms worsen.      PATIENT REFERRED TO:  [facility] EMERGENCY DEPT  [address]  [facility] [address]  [phone]    As needed, If symptoms worsen    Marybel Obando APRN - NP  [address] [facility] [address] [phone]    Schedule an appointment as soon as possible for a visit       [facility]  [address]  [address]  [facility] [address]    As needed        DISCHARGE MEDICATIONS:     Medication List        ASK your doctor about these medications      acetaminophen 650 MG extended release tablet  Commonly known as: TYLENOL     alendronate 70 MG tablet  Commonly known as: FOSAMAX     aspirin 81 MG EC tablet     carvedilol 3.125 MG tablet  Commonly known as: COREG     diclofenac sodium 1 % Gel  Commonly known as: VOLTAREN     famotidine 40 MG tablet  Commonly known as: PEPCID     insulin 70-30 (70-30) 100 UNIT per ML injection vial  Commonly known as: HUMULIN;NOVOLIN     * levothyroxine 112 MCG tablet  Commonly known as: SYNTHROID     * levothyroxine 75 MCG tablet  Commonly known as: SYNTHROID     metFORMIN 500 MG tablet  Commonly known as: GLUCOPHAGE     ondansetron 4 MG disintegrating tablet  Commonly known as: ZOFRAN-ODT     rosuvastatin 20 MG tablet  Commonly known as: CRESTOR     tiZANidine 4 MG tablet  Commonly known as: ZANAFLEX     Trulicity 3 MG/0.5ML Sopn  Generic drug: Dulaglutide     vitamin D 50 MCG (2000 UT) Caps capsule  Commonly known as: CHOLECALCIFEROL           * This list has 2 medication(s) that are the same as other medications prescribed for you. Read the directions carefully, and ask your doctor or other care provider to review them with you.                    DISCONTINUED MEDICATIONS:  Discharge Medication List as of 8/27/2024  2:28 PM              I am the Primary Clinician of Record.   VICTORIA Grove (electronically signed)    (Please note that parts of this dictation were completed with voice  recognition software. Quite often unanticipated grammatical, syntax, homophones, and other interpretive errors are inadvertently transcribed by the computer software. Please disregards these errors. Please excuse any errors that have escaped final proofreading.)         Mitzi Matthews PA  08/27/24 2662

## 2024-08-27 NOTE — DISCHARGE INSTRUCTIONS
Thank You!    It was a pleasure taking care of you in our Emergency Department today. We know that when you come to [facility], you are entrusting us with your health, comfort, and safety. Our clinicians honor that trust, and truly appreciate the opportunity to care for you and your loved ones.    If you receive a survey about your Emergency Department experience today, please fill it out.  We value your feedback. Thank you.      Mitzi Matthews PA-C    ___________________________________  I have included a copy of your lab results and/or radiologic studies from today's visit so you can have them easily available at your follow-up visit.   No results found for this or any previous visit (from the past 12 hour(s)).    No orders to display     [unfilled]

## 2025-03-21 ENCOUNTER — TELEPHONE (OUTPATIENT)
Age: 70
End: 2025-03-21

## 2025-03-21 NOTE — TELEPHONE ENCOUNTER
----- Message from Vanessa TEJEDA sent at 3/20/2025 11:06 AM EDT -----  Called and spoke to pt. Pt stated that she wasn't sure if she wanted to come back to this office. She stated she needed to speak with her doctor first and says she will call back tomorrow 3/20/25.  ----- Message -----  From: Kaylah Romero APRN - RAOUL  Sent: 3/20/2025  10:12 AM EDT  To: Kristian Cox V, RN; #    This patient never scheduled follow-up

## 2025-05-20 NOTE — PROGRESS NOTES
Problem: Self Care Deficits Care Plan (Adult) Goal: *Acute Goals and Plan of Care (Insert Text) Description Occupational Therapy Goals Initiated 5/7/2019 Goals remain the same at Re-eval 
 
1. Patient will perform bathing with minimal assistance/contact guard assist using Reacher PRN within 7 days. 2.  Patient will perform lower body dressing with moderate assistance  using most appropriate DME within 7 days. 3.  Patient will toileting at supervision/set-up within 7 days. 4.  Patient will don/doff back brace at minimal assistance/contact guard assist within 7 days. 5.  Patient will verbalize/demonstrate 3/3 back precautions during ADL tasks without cues within 7 days. Outcome: Progressing Towards Goal 
 OCCUPATIONAL THERAPY REEVALUATION Patient: Deborah Schumacher (8 y.o. female) Date: 5/8/2019 Diagnosis: Cervical stenosis of spinal canal [M48.02] Spinal stenosis of lumbar region at multiple levels [M48.061] S/P lumbar spinal fusion Procedure(s) (LRB): STAGED CASE PART 2 - L2-5 POSTERIOR DECOMPRESSION AND FUSION (N/A) 1 Day Post-Op Precautions: Spinal 
 
ASSESSMENT : 
Based on the objective data described below, the patient presents with generalized weakness, decreased endurance, strength, functional mobility, and ADLs. Pt was living at home with family and was independent with ADLs and ILS. She was cleared to be seen and all VSS. Pt was sitting up in EOb eating lunch and BP was stable and she had just received 1 unit of blood. Pt was stating taht she was having pain in her back at incision site unable to rate her pain. She was min for donning back brace, and able to stand with min assist of 2 and with use of RW was min assist of 1 for transfer to [facility]. She was CGA to min for standing from chair and CGA for transfers BSC<> chair. Pt was left in chair with call bell within  reach and nursing notified. Recommend that pt return home with family and home care OT and PT. Tania Porter Pt is back in the ER at [place]. They did the blood work so the results are in his chart. He fell a couple of time this week. Sunday and this morning. His legs are just not working anymore. Sunday when the paramedics pick him up, he passsed out. They did another CT scan and waiting on the results. The wife is asking what they should do since the wife is not able to pick him up because of his size. Daughter-in-law and son would like mother-in-law to move in with them and ask if would be best for pt to go to a home? Please advise.   Patient will benefit from skilled intervention to address the above impairments. Patient?s rehabilitation potential is considered to be Good Factors which may influence rehabilitation potential include:  
? None noted ? Mental ability/status ? Medical condition ? Home/family situation and support systems ? Safety awareness ? Pain tolerance/management ? Other: PLAN : 
Recommendations and Planned Interventions: 
?                  Self Care Training                  ? Therapeutic Activities ? Functional Mobility Training    ? Cognitive Retraining 
? Therapeutic Exercises           ? Endurance Activities ? Balance Training                   ? Neuromuscular Re-Education ? Visual/Perceptual Training     ? Home Safety Training 
? Patient Education                 ? Family Training/Education ? Other (comment): Frequency/Duration: Patient will be followed by occupational therapy 4 times a week to address goals. Discharge Recommendations: Home Health Further Equipment Recommendations for Discharge: tbd SUBJECTIVE:  
Patient stated This feels much better sitting up in chair. .? OBJECTIVE DATA SUMMARY:  
Hospital course since last seen and reason for reevaluation: pt had second part of her back surgery. Cognitive/Behavioral Status: 
Neurologic State: Alert Orientation Level: Oriented X4 Cognition: Follows commands Perception: Appears intact Perseveration: No perseveration noted Safety/Judgement: Awareness of environment Skin: in good health Edema: none noted Vision/Perceptual:   
    
    
   Intact Range of Motion: 
AROM: Generally decreased, functional 
  
  
   
Strength: 
Strength: Generally decreased, functional 
  
 Coordination: 
Coordination: Within functional limits Fine Motor Skills-Upper: Left Intact; Right Intact Gross Motor Skills-Upper: Left Intact; Right Intact Tone & Sensation: 
Tone: Normal 
Sensation: Intact Balance: 
Sitting: Intact Standing: Impaired; With support Standing - Static: Good;Constant support Standing - Dynamic : Fair Functional Mobility and Transfers for ADLs: 
Bed Mobility: 
Rolling: Other (comment)(seated EOB upon arrival ) Transfers: 
Sit to Stand: Contact guard assistance Bed to Chair: Minimum assistance ADL Assessment: 
Feeding: Independent Oral Facial Hygiene/Grooming: Setup Bathing: Maximum assistance Lower Body Dressing: Setup;Maximum assistance Toileting: Contact guard assistance Cognitive Retraining Safety/Judgement: Awareness of environment Functional Measure: 
Barthel Index: 
Bathin Bladder: 0 Bowels: 10 
Groomin Dressin Feeding: 10 Mobility: 0 Stairs: 0 Toilet Use: 5 Transfer (Bed to Chair and Back): 10 Total: 45/100 Percentage of impairment  
0% 1-19% 20-39% 40-59% 60-79% 80-99% 100% Barthel Score 0-100 100 99-80 79-60 59-40 20-39 1-19 
 0 The Barthel ADL Index: Guidelines 1. The index should be used as a record of what a patient does, not as a record of what a patient could do. 2. The main aim is to establish degree of independence from any help, physical or verbal, however minor and for whatever reason. 3. The need for supervision renders the patient not independent. 4. A patient's performance should be established using the best available evidence. Asking the patient, friends/relatives and nurses are the usual sources, but direct observation and common sense are also important. However direct testing is not needed. 5. Usually the patient's performance over the preceding 24-48 hours is important, but occasionally longer periods will be relevant. 6. Middle categories imply that the patient supplies over 50 per cent of the effort. 7. Use of aids to be independent is allowed. Michelle Carbajal., Barthel, D.W. (6383). Functional evaluation: the Barthel Index. [address] (14)2. MONE Rios, Peyman Yuen., [facility], [facility] [facility] (1999). Measuring the change indisability after inpatient rehabilitation; comparison of the responsiveness of the Barthel Index and Functional [place] Measure. Journal of Neurology, Neurosurgery, and Psychiatry, 66(4), 656-717. Zainab Kelley, N.J.A, YOSSI Merritt, & Joe Montgomery M.A. (2004.) Assessment of post-stroke quality of life in cost-effectiveness studies: The usefulness of the Barthel Index and the EuroQoL-5D. [facility], 13, 324-32 Occupational Therapy Evaluation Charge Determination History Examination Decision-Making LOW Complexity : Brief history review  LOW Complexity : 1-3 performance deficits relating to physical, cognitive , or psychosocial skils that result in activity limitations and / or participation restrictions  LOW Complexity : No comorbidities that affect functional and no verbal or physical assistance needed to complete eval tasks Based on the above components, the patient evaluation is determined to be of the following complexity level: LOW Pain: 
Pain Scale 1: Numeric (0 - 10) Pain Intensity 1: 6 Pain Location 1: Back Pain Orientation 1: Lower Pain Description 1: Aching Pain Intervention(s) 1: Medication (see MAR) Activity Tolerance:  
fair Please refer to the flowsheet for vital signs taken during this treatment. After treatment:  
? Patient left in no apparent distress sitting up in chair ? Patient left in no apparent distress in bed 
? Call bell left within reach ? Nursing notified ? Caregiver present ? Bed alarm activated COMMUNICATION/EDUCATION:  
The patient?s plan of care was discussed with: Physical Therapist and Registered Nurse. 
? Home safety education was provided and the patient/caregiver indicated understanding. ? Patient/family have participated as able in goal setting and plan of care. ?    Patient/family agree to work toward stated goals and plan of care. ?    Patient understands intent and goals of therapy, but is neutral about his/her participation. ? Patient is unable to participate in goal setting and plan of care. This patient?s plan of care is appropriate for delegation to [facility]. Thank you for this referral. 
Trisha Barroso OT Time Calculation: 24 mins

## 2025-07-21 NOTE — DISCHARGE SUMMARY
Ortho Discharge Summary    Patient ID:  María Awad  [id number]  female  70 y.o.  1955    Admit date: 5/6/2019    Discharge date: 5/22/2019    Admitting Physician: Bernarda Prado MD     Consulting Physician(s):   Treatment Team: Nurse Practitioner: Bryan Bahena NP; Utilization Review: Alejandro Aragon RN; Care Manager: Sajan Peterson Consulting Provider: Ashleigh Alcocer MD    Date of Surgery:   5/7/2019     Preoperative Diagnosis:  SPINAL STENOSIS, LUMBAR REGION, ACQUIRED SPONDYLOLISTHESIS, SCIATICA LEFT SIDE, LOW BACK PAIN    Postoperative Diagnosis:   Spinal Stenosis, Lumbar Region, Acquired    Procedure(s):  STAGED CASE PART 2 - L2-5 POSTERIOR DECOMPRESSION AND FUSION     Anesthesia Type:   General     Surgeon: Lopez Amaya MD                               HPI:  Pt is a 70 y.o. female who has a history of SPINAL STENOSIS, LUMBAR REGION, ACQUIRED SPONDYLOLISTHESIS, SCIATICA LEFT SIDE, LOW BACK PAIN  with pain and limitations of activities of daily living who presents at this time for a  L2 through L5 posterior  And lateral fusion following the failure of conservative management. PMH:   Past Medical History:   Diagnosis Date    Arthritis     CAD (coronary artery disease)     6 stents (CARMITA) [facility]/21/[facility] at [facility]    Diabetes ([address]     Dizziness     Dyspepsia and other specified disorders of function of stomach     GERD (gastroesophageal reflux disease)     Headache(784.0)     Hx of cardiac cath 08/2018    after abnormal stress test 3/13/18    Hypertension     Loss of appetite     Musculoskeletal disorder     back pain    Other ill-defined conditions(799.89)     shingles    Thyroid disease     hypothyroidism       Medications upon admission :   Prior to Admission Medications   Prescriptions Last Dose Informant Patient Reported? Taking? Blood-Glucose Meter (RELION PRIME METER) misc 5/5/2019 at Unknown time  Yes Yes   Sig: by Does Not Apply route.    DULoxetine (CYMBALTA) 60 mg capsule 5/1/2019  Yes No   Sig: Take 60 mg by mouth daily. aspirin delayed-release 81 mg tablet 5/1/2019  Yes No   Sig: Take 81 mg by mouth daily. carvedilol (COREG) 3.125 mg tablet 5/6/2019 at 0730  Yes Yes   Sig: Take 12.5 mg by mouth two (2) times daily (with meals). clopidogrel (PLAVIX) 75 mg tab 5/1/2019  Yes No   Sig: Take 75 mg by mouth daily. doxycycline (ADOXA) 100 mg tablet Not Taking at Unknown time  Yes No   Sig: Take 100 mg by mouth two (2) times a day. glimepiride (AMARYL) 4 mg tablet 5/1/2019  No No   Sig: Take 1 Tab by mouth every morning. ibuprofen (MOTRIN) 800 mg tablet 4/6/2019 at Unknown time  Yes No   Sig: Take 800 mg by mouth every eight (8) hours as needed for Pain. insulin NPH/insulin regular (NOVOLIN 70/30 U-100 INSULIN) 100 unit/mL (70-30) injection 5/5/2019 at Unknown time  Yes Yes   Sig: by SubCUTAneous route. Indications: takes 17 units in the morning and 15 units at dinner   levothyroxine (SYNTHROID) 112 mcg tablet 5/6/2019 at 0730  No Yes   Sig: Take 1 Tab by mouth Daily (before breakfast). metFORMIN (GLUCOPHAGE) 500 mg tablet 5/5/2019 at Unknown time  No Yes   Sig: Take 1 Tab by mouth two (2) times daily (with meals). potassium chloride (KLOR-CON) 10 mEq tablet 5/5/2019 at Unknown time  No Yes   Sig: Take 1 Tab by mouth three (3) times daily. raNITIdine (ZANTAC) 300 mg tab 5/6/2019 at 0730  No Yes   Sig: Take 1 Tab by mouth daily. rosuvastatin (CRESTOR) 20 mg tablet 5/1/2019  No No   Sig: Take 1 Tab by mouth nightly. tiZANidine (ZANAFLEX) 4 mg tablet 4/26/2019  Yes No   Sig: Take 4 mg by mouth three (3) times daily as needed. traMADol (ULTRAM) 50 mg tablet 4/6/2019 at Unknown time  No No   Sig: TAKE 1 TABLET BY MOUTH EVERY 8 HOURS AS NEEDED FOR PAIN.  MG (3 TABLETS) PER DAY   valerian root 450 mg cap 4/26/2019  Yes No   Sig: Take 2 Tabs by mouth nightly. Facility-Administered Medications: None        Allergies:     Allergies   Allergen Reactions    Penicillins Rash        Hospital Course: The patient underwent surgery. Complications:  None; patient tolerated the procedure well. Was taken to the PACU in stable condition and then transferred to the ortho floor. Perioperative Antibiotics:  Ancef     Postoperative Pain Management:   oxycodone         Postoperative transfusions:    Number of units banked PRBCs =   none     Post Op complications: none    Hemoglobin at discharge:    Lab Results   Component Value Date/Time    HGB 7.8 (L) 05/11/2019 04:24 AM    INR 1.1 05/01/2019 07:44 AM       Dressing was changed on POD # 4. Incision - clean, dry and intact. No significant erythema or swelling. Neurovascular exam found to be within normal limits. Wound appears to be healing without any evidence of infection. Pt had a HVAC drain that was removed on POD# 4. Physical Therapy started on the day following surgery and participated in bed mobility, transfers and ambulation. Gait:  Gait  Base of Support: Widened  Speed/Jessica: Pace decreased (<100 feet/min)  Step Length: Left shortened, Right shortened  Gait Abnormalities: Decreased step clearance, Shuffling gait  Ambulation - Level of Assistance: Contact guard assistance  Distance (ft): 100 Feet (ft)  Assistive Device: Walker, rolling, Brace/Splint  Rail Use: Both  Stairs - Level of Assistance: Minimum assistance  Number of Stairs Trained: 4                   Discharged to: Home. Discharge instructions:    - Take pain medications as prescribed  - Resume pre hospital diet      - Discharge activity: activity as tolerated  - Ambulate with Walker; appropriate total joint protocol  - Weight bearing status Full  - Wound Care Keep wound clean and dry.   See discharge instruction sheet.  - Staples to be removed 10 days after surgery            -DISCHARGE MEDICATION LIST     Discharge Medication List as of 5/11/2019 11:08 AM      START taking these medications    Details   acetaminophen (TYLENOL) 500 mg tablet Take 2 Tabs by mouth every six (6) hours for 14 days. , Print, Disp-112 Tab, R-0      oxyCODONE IR (ROXICODONE) 5 mg immediate release tablet Take 1 Tab by mouth every four (4) hours as needed for Pain for up to 14 days. Max Daily Amount: 30 mg., Print, Disp-60 Tab, R-0      polyethylene glycol (MIRALAX) 17 gram packet Take 1 Packet by mouth daily for 15 days. , Print, Disp-15 Packet, R-0      senna-docusate (PERICOLACE) 8.6-50 mg per tablet Take 1 Tab by mouth two (2) times a day for 15 days. , Print, Disp-30 Tab, R-0         CONTINUE these medications which have NOT CHANGED    Details   doxycycline (ADOXA) 100 mg tablet Take 100 mg by mouth two (2) times a day., Historical Med      valerian root 450 mg cap Take 2 Tabs by mouth nightly., Historical Med      potassium chloride (KLOR-CON) 10 mEq tablet Take 1 Tab by mouth three (3) times daily. , Normal, Disp-30 Tab, R-1      glimepiride (AMARYL) 4 mg tablet Take 1 Tab by mouth every morning., Normal, Disp-90 Tab, R-4      levothyroxine (SYNTHROID) 112 mcg tablet Take 1 Tab by mouth Daily (before breakfast). , Normal, Disp-90 Tab, R-4      raNITIdine (ZANTAC) 300 mg tab Take 1 Tab by mouth daily. , Normal, Disp-90 Tab, R-4      metFORMIN (GLUCOPHAGE) 500 mg tablet Take 1 Tab by mouth two (2) times daily (with meals). , Normal, Disp-180 Tab, R-4      rosuvastatin (CRESTOR) 20 mg tablet Take 1 Tab by mouth nightly., Normal, Disp-90 Tab, R-4      carvedilol (COREG) 3.125 mg tablet Take 12.5 mg by mouth two (2) times daily (with meals). , Historical Med      clopidogrel (PLAVIX) 75 mg tab Take 75 mg by mouth daily. , Historical Med      DULoxetine (CYMBALTA) 60 mg capsule Take 60 mg by mouth daily. , Historical Med      tiZANidine (ZANAFLEX) 4 mg tablet Take 4 mg by mouth three (3) times daily as needed., Historical Med      Blood-Glucose Meter (RELION PRIME METER) misc by Does Not Apply route., Historical Med      aspirin delayed-release 81 mg tablet Take 81 mg by mouth daily. , Historical Med      insulin NPH/insulin regular (NOVOLIN 70/30 U-100 INSULIN) 100 unit/mL (70-30) injection by SubCUTAneous route.  Indications: takes 17 units in the morning and 15 units at dinner, Historical Med         STOP taking these medications       ibuprofen (MOTRIN) 800 mg tablet Comments:   Reason for Stopping:         traMADol (ULTRAM) 50 mg tablet Comments:   Reason for Stopping:            per medical continuation form      -Follow up in office in 2 weeks      Signed:  Navi Holguin PA-C    5/22/2019  5:25 PM done

## 2025-07-24 ENCOUNTER — TRANSCRIBE ORDERS (OUTPATIENT)
Facility: HOSPITAL | Age: 70
End: 2025-07-24

## 2025-07-24 DIAGNOSIS — M81.0 OSTEOPOROSIS, UNSPECIFIED OSTEOPOROSIS TYPE, UNSPECIFIED PATHOLOGICAL FRACTURE PRESENCE: Primary | ICD-10-CM

## (undated) DEVICE — TOOL 14MH30 LEGEND 14CM 3MM: Brand: MIDAS REX ™

## (undated) DEVICE — SUTURE STRATAFIX SPRL MCRYL + SZ 2-0 L18IN ABSRB UD CT-1 SXMP1B413

## (undated) DEVICE — DRSG PATCH ANTIMIC 1INX4.0MM -- CONVERT TO ITEM 356053

## (undated) DEVICE — ELECTRODE BLDE L4IN NONINSULATED EDGE

## (undated) DEVICE — CONTAINER,SPECIMEN,3OZ,OR STRL: Brand: MEDLINE

## (undated) DEVICE — BLUNT DISSECTOR: Brand: ENDO PEANUT

## (undated) DEVICE — STERILE POLYISOPRENE POWDER-FREE SURGICAL GLOVES: Brand: PROTEXIS

## (undated) DEVICE — CASPAR DISTR PIN12MMSTER: Brand: AESCULAP

## (undated) DEVICE — TUBING IRRIG L77IN DIA0.241IN L BOR FOR CYSTO W/ NVENT

## (undated) DEVICE — GOWN,SIRUS,NONRNF,SETINSLV,2XL,18/CS: Brand: MEDLINE

## (undated) DEVICE — CONTAINER SPEC 20 ML LID NEUT BUFF FORMALIN 10 % POLYPR STS

## (undated) DEVICE — SPONGE GZ W4XL4IN COT 12 PLY TYP VII WVN C FLD DSGN

## (undated) DEVICE — TRAY CATH 16F URIN MTR LTX -- CONVERT TO ITEM 363111

## (undated) DEVICE — SYR 50ML LR LCK 1ML GRAD NSAF --

## (undated) DEVICE — SOLUTION IRRIG 1000ML H2O STRL BLT

## (undated) DEVICE — SYR 3ML LL TIP 1/10ML GRAD --

## (undated) DEVICE — DRAIN SURG 10FR L1/8IN DIA3.2MM SIL CHN RND HUBLESS FULL

## (undated) DEVICE — 3M™ TEGADERM™ TRANSPARENT FILM DRESSING FRAME STYLE, 1626W, 4 IN X 4-3/4 IN (10 CM X 12 CM), 50/CT 4CT/CASE: Brand: 3M™ TEGADERM™

## (undated) DEVICE — HANDLE LT SNAP ON ULT DURABLE LENS FOR TRUMPF ALC DISPOSABLE

## (undated) DEVICE — Device

## (undated) DEVICE — HALTER TRACTION HD W/ TRI COTTON LINING FOAM LTX

## (undated) DEVICE — C-ARMOR C-ARM EQUIPMENT COVERS CLEAR STERILE UNIVERSAL FIT 12 PER CASE: Brand: C-ARMOR

## (undated) DEVICE — DRAPE,CHEST,FENES,15X10,STERIL: Brand: MEDLINE

## (undated) DEVICE — MEDI-VAC NON-CONDUCTIVE SUCTION TUBING: Brand: CARDINAL HEALTH

## (undated) DEVICE — NEEDLE HYPO 18GA L1.5IN PNK S STL HUB POLYPR SHLD REG BVL

## (undated) DEVICE — SUTURE VCRL SZ 2-0 L27IN ABSRB UD L26MM SH 1/2 CIR J417H

## (undated) DEVICE — NEONATAL-ADULT SPO2 SENSOR: Brand: NELLCOR

## (undated) DEVICE — AEGIS 1" DISK 4MM HOLE, PEEL OPEN: Brand: MEDLINE

## (undated) DEVICE — SUTURE PERMAHAND SZ 2-0 L30IN NONABSORBABLE BLK SILK W/O A305H

## (undated) DEVICE — Z DISCONTINUED PER MEDLINE LINE GAS SAMPLING O2/CO2 LNG AD 13 FT NSL W/ TBNG FILTERLINE

## (undated) DEVICE — KENDALL SCD EXPRESS SLEEVES, KNEE LENGTH, MEDIUM: Brand: KENDALL SCD

## (undated) DEVICE — SYRINGE MED 20ML STD CLR PLAS LUERLOCK TIP N CTRL DISP

## (undated) DEVICE — BLOCK BITE ENDOSCP AD 21 MM W/ DIL BLU LF DISP

## (undated) DEVICE — FLOSEAL MATRIX IS INDICATED IN SURGICAL PROCEDURES (OTHER THAN IN OPHTHALMIC) AS AN ADJUNCT TO HEMOSTASIS WHEN CONTROL OF BLEEDING BY LIGATURE OR CONVENTIONALPROCEDURES IS INEFFECTIVE OR IMPRACTICAL.: Brand: FLOSEAL HEMOSTATIC MATRIX

## (undated) DEVICE — 3000CC GUARDIAN II: Brand: GUARDIAN

## (undated) DEVICE — COVER,TABLE,60X90,STERILE: Brand: MEDLINE

## (undated) DEVICE — SUTURE VCRL SZ 2-0 L18IN ABSRB UD CT-1 L36MM 1/2 CIR J839D

## (undated) DEVICE — FORCEPS BX L160CM DIA8MM GRSP DISECT CUP TIP NONLOCKING ROT

## (undated) DEVICE — CATH IV AUTOGRD BC PNK 20GA 25 -- INSYTE

## (undated) DEVICE — LAMINECTOMY RICHMOND-LF: Brand: MEDLINE INDUSTRIES, INC.

## (undated) DEVICE — ELECTRODE,RADIOTRANSLUCENT,FOAM,5PK: Brand: MEDLINE

## (undated) DEVICE — STERILE POLYISOPRENE POWDER-FREE SURGICAL GLOVES WITH EMOLLIENT COATING: Brand: PROTEXIS

## (undated) DEVICE — SYR 10ML LUER LOK 1/5ML GRAD --

## (undated) DEVICE — DRAPE,LAPAROTOMY,PCH,STERILE: Brand: MEDLINE

## (undated) DEVICE — (D)PREP SKN CHLRAPRP APPL 26ML -- CONVERT TO ITEM 371833

## (undated) DEVICE — 1200 GUARD II KIT W/5MM TUBE W/O VAC TUBE: Brand: GUARDIAN

## (undated) DEVICE — KIT JACK TBL PT CARE

## (undated) DEVICE — SET ADMIN 16ML TBNG L100IN 2 Y INJ SITE IV PIGGY BK DISP

## (undated) DEVICE — TRAY CATH 16F DRN BG LTX -- CONVERT TO ITEM 363158

## (undated) DEVICE — GAUZE SPONGES,12 PLY: Brand: CURITY

## (undated) DEVICE — Z CONVERTED USE 2107985 COVER FLROSCP W36XL28IN 4 SIDE ADH

## (undated) DEVICE — SOLUTION IRRIG 3000ML 0.9% SOD CHL FLX CONT 0797208] ICU MEDICAL INC]

## (undated) DEVICE — TOWEL SURG W17XL27IN STD BLU COT NONFENESTRATED PREWASHED

## (undated) DEVICE — TOWEL 4 PLY TISS 19X30 SUE WHT

## (undated) DEVICE — BASIN EMSIS 16OZ GRAPHITE PLAS KID SHP MOLD GRAD FOR ORAL

## (undated) DEVICE — MARS DISPOSABLE KIT, 3V

## (undated) DEVICE — ADHESIVE SKIN CLOSURE 4X22 CM PREMIERPRO EXOFINFUSION DISP

## (undated) DEVICE — BIPOLAR FORCEPS CORD: Brand: VALLEYLAB

## (undated) DEVICE — MAGNETIC INSTRUMENT PAD 10" X 16"; MEDIUM; DISPOSABLE: Brand: CARDINAL HEALTH

## (undated) DEVICE — SUTURE VCRL SZ 1 L18IN ABSRB VLT CT-1 L36MM 1/2 CIR J741D

## (undated) DEVICE — INFECTION CONTROL KIT SYS

## (undated) DEVICE — SUT PROL 6-0 18IN RB2 DA BLU --

## (undated) DEVICE — COVER,MAYO STAND,STERILE: Brand: MEDLINE

## (undated) DEVICE — SLIM BODY SKIN STAPLER: Brand: APPOSE ULC

## (undated) DEVICE — SOLUTION IV 1000ML 0.9% SOD CHL

## (undated) DEVICE — DRAPE,LAP,CHOLE,W/TROUGHS,STERILE: Brand: MEDLINE

## (undated) DEVICE — FLOSEAL HEMOSTATIC MATRIX, 5 ML: Brand: FLOSEAL

## (undated) DEVICE — DRESSING N ADH W3XL4IN NONWOVEN

## (undated) DEVICE — BAG SPEC BIOHZRD 10 X 10 IN --

## (undated) DEVICE — SPONGE: SPECIALTY PEANUT XR 100/CS: Brand: MEDICAL ACTION INDUSTRIES

## (undated) DEVICE — INSULATED BLADE ELECTRODE: Brand: EDGE

## (undated) DEVICE — SOLIDIFIER FLD 2OZ 1500CC N DISINF IN BTL DISP SAFESORB

## (undated) DEVICE — SUTURE MCRYL SZ 2-0 L36IN ABSRB UD L36MM CT-1 1/2 CIR Y945H

## (undated) DEVICE — BNDG ADHESIVE FABRIC 2X3.5IN -- CONVERT TO ITEM 357955

## (undated) DEVICE — YANKAUER,TAPERED BULBOUS TIP,W/O VENT: Brand: MEDLINE

## (undated) DEVICE — BONE WAX WHITE: Brand: BONE WAX WHITE

## (undated) DEVICE — 3M™ STERI-DRAPE™ INSTRUMENT POUCH 1018: Brand: STERI-DRAPE™

## (undated) DEVICE — HYPODERMIC SAFETY NEEDLE: Brand: MAGELLAN

## (undated) DEVICE — SURGIFOAM SPNG SZ 100

## (undated) DEVICE — ANNULOTOMY KNIFE

## (undated) DEVICE — PREP CHLORAPREP 10.5 ML ORG --

## (undated) DEVICE — BONE MARROW KIT ASPIR 11 GA

## (undated) DEVICE — LABEL MED CARD MRMC STRL

## (undated) DEVICE — SUTURE V-LOC 180 SZ 0 L12IN ABSRB GRN L37MM GS-21 1/2 CIR VLOCL0316

## (undated) DEVICE — DRAPE MICSCP W20XL64IN CLR LENS WECK FOR ZEISS OPMI

## (undated) DEVICE — NDL SPNE QNCKE 18GX3.5IN LF --

## (undated) DEVICE — SUTURE VCRL UD BR CT 3-0 18IN CT1 J838D

## (undated) DEVICE — Z DISCONTINUED USE 2717541 SUTURE STRATAFIX SZ 3-0 L30CM NONABSORBABLE UD L26MM FS 3/8